# Patient Record
Sex: MALE | Race: WHITE | NOT HISPANIC OR LATINO
[De-identification: names, ages, dates, MRNs, and addresses within clinical notes are randomized per-mention and may not be internally consistent; named-entity substitution may affect disease eponyms.]

---

## 2020-02-27 PROBLEM — Z00.00 ENCOUNTER FOR PREVENTIVE HEALTH EXAMINATION: Status: ACTIVE | Noted: 2020-02-27

## 2020-03-03 ENCOUNTER — APPOINTMENT (OUTPATIENT)
Dept: INTERNAL MEDICINE | Facility: CLINIC | Age: 55
End: 2020-03-03
Payer: COMMERCIAL

## 2020-03-03 VITALS
DIASTOLIC BLOOD PRESSURE: 80 MMHG | BODY MASS INDEX: 31.07 KG/M2 | HEIGHT: 68 IN | SYSTOLIC BLOOD PRESSURE: 130 MMHG | WEIGHT: 205 LBS

## 2020-03-03 DIAGNOSIS — Z78.9 OTHER SPECIFIED HEALTH STATUS: ICD-10-CM

## 2020-03-03 PROCEDURE — 99205 OFFICE O/P NEW HI 60 MIN: CPT | Mod: 25

## 2020-03-03 PROCEDURE — 36415 COLL VENOUS BLD VENIPUNCTURE: CPT

## 2020-03-03 NOTE — HISTORY OF PRESENT ILLNESS
[FreeTextEntry1] : This is a complicated case of a 54-year-old  male who is referred here because of probable septic arthritis and osteomyelitis of his left hallux. 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 Approximately 6 years ago the patient developed a blister that developed into cellulitis and what appears to be osteomyelitis. Eventually required debridement and removal of his interphalangeal phalanx of his left great toe. He received short course of antibiotics intravenously and was out of work for 2-3 Ma\par He returns now because for the last several months beginning around the summertime he developed a blister the base of his left great toe. He has been followed by a podiatry and has had multiple local debridement on some multiple courses of oral antibiotics. Patient had anaphylaxis to penicillin as a child but has tolerated Keflex since then without difficulty.\par He has been treated with oral clindamycin x4. Most recently patient had an MRI of the toe that has revealed significant abnormalities there was erosion of the first metatarsal head with diffuse marrow replacement consistent with osteomyelitis. In addition he had erosions of the first metatarsal head and adjacent hallux sesamoid. They're MR findings of septic arthritis the first MP joint with osteomyelitis. This is read  from an MRI report sent to us. 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 He states he is become homeless if he has surgery now.\par He is sent to the to try a course of oral intravenous antibiotics without surgery\par \par He denies symptomatic neuropathy or systemic symptoms\par \par

## 2020-03-03 NOTE — CONSULT LETTER
[Dear  ___] : Dear  [unfilled], [Consult Letter:] : I had the pleasure of evaluating your patient, [unfilled]. [Please see my note below.] : Please see my note below. [Consult Closing:] : Thank you very much for allowing me to participate in the care of this patient.  If you have any questions, please do not hesitate to contact me. [Sincerely,] : Sincerely, [FreeTextEntry3] : Aurelia\par Siva Stevenson MD FACP\par Diplomates American Board of Internal Medicine and Infectious Diseases\par NPP Infectious diseases and Internal medicine [facility]

## 2020-03-03 NOTE — PHYSICAL EXAM
[General Appearance - Alert] : alert [General Appearance - In No Acute Distress] : in no acute distress [PERRL With Normal Accommodation] : pupils were equal in size, round, reactive to light [Sclera] : the sclera and conjunctiva were normal [Outer Ear] : the ears and nose were normal in appearance [Extraocular Movements] : extraocular movements were intact [Neck Appearance] : the appearance of the neck was normal [Oropharynx] : the oropharynx was normal with no thrush [Neck Cervical Mass (___cm)] : no neck mass was observed [Thyroid Diffuse Enlargement] : the thyroid was not enlarged [Jugular Venous Distention Increased] : there was no jugular-venous distention [Auscultation Breath Sounds / Voice Sounds] : lungs were clear to auscultation bilaterally [Heart Rate And Rhythm] : heart rate was normal and rhythm regular [Heart Sounds] : normal S1 and S2 [Heart Sounds Gallop] : no gallops [Murmurs] : no murmurs [Heart Sounds Pericardial Friction Rub] : no pericardial rub [Full Pulse] : the pedal pulses are present [Bowel Sounds] : normal bowel sounds [Edema] : there was no peripheral edema [Abdomen Soft] : soft [Abdomen Tenderness] : non-tender [Abdomen Mass (___ Cm)] : no abdominal mass palpated [Costovertebral Angle Tenderness] : no CVA tenderness [No Palpable Adenopathy] : no palpable adenopathy [Skin Color & Pigmentation] : normal skin color and pigmentation [] : no rash [Sensation] : the sensory exam was normal to light touch and pinprick [Deep Tendon Reflexes (DTR)] : deep tendon reflexes were 2+ and symmetric [No Focal Deficits] : no focal deficits [FreeTextEntry1] : Patient had normal sensation and couldn't distinguish pain from touch on his left foot

## 2020-03-03 NOTE — ASSESSMENT
[FreeTextEntry1] : Patient clearly has septic arthritis and osteomyelitis of his left great toe. In addition he has a sinus tract that probes deeply to bone consistent with the above diagnosis. He is also diabetic and is unclear what his A1c is\par A very long conversation with the patient that oral or even intravenous antibiotics by itself has limited efficacy based on the findings on MRI.\par If he was able to have it performed he should have operative open debridement deep bone cultures and drainage of any septic arthritis and probable removal of sesamoid bones.\par I do not think antibiotics will be curative. Previous cultures grew a sensitive group B strep as well as sensitive Klebsiella\par The options to the patient were given to him. He stated quite emphatically that he is unable to have surgery on his foot and he is aware of the potential loss of large toe due to persistent infection. Patient states quite clearly that he will become homeless if he is out of work and he will not be able to maintain his occupation if he has surgery.\par Based on the above plan opted to obtain a deep culture although MIP negative since he just came off a course of antibiotics.\par Since this is an infection of a chronic nature I have opted to defer antibiotics and have the patient followup in 2 weeks for a repeat deep cultures. At that time patient should be placed on intravenous antibiotics for 6-8 week course of treatment followed by oral therapy. The patient has no issues with intravenous antibiotics.\par \par I do not feel it is important to start immediately as he has been on multiple courses of antibiotics and he has no objective evidence of active cellulitis or purulence. This is clearly a chronic condition.\par Patient will follow up sooner if he develops increased redness for institution of antibiotics immediately.\par Most likely I will give the patient ceftriaxone based on sensitivities. His anaphylaxis to penicillin was as a child and he has been on cephalosporin since then without difficulty\par \par This is all discussed with patient at great length and I noted my hesitancy to just treat with antibiotics alone and the patient is fully aware that he may not get a good outcome until he has definitive surgery but at the current time he is unable to\par \par All issues regarding patient's health and medical problems have been discussed. The patient understands and concurs with the treatment plan. [Treatment Education] : treatment education [Rx Dose / Side Effects] : Rx dose/side effects [Risk Reduction] : risk reduction [Disclosure of Diagnosis] : disclosure of diagnosis [Anticipatory Guidance] : anticipatory guidance

## 2020-03-04 LAB
ALBUMIN SERPL ELPH-MCNC: 4.1 G/DL
ALP BLD-CCNC: 118 U/L
ALT SERPL-CCNC: 9 U/L
ANION GAP SERPL CALC-SCNC: 14 MMOL/L
AST SERPL-CCNC: 7 U/L
BASOPHILS # BLD AUTO: 0.04 K/UL
BASOPHILS NFR BLD AUTO: 0.5 %
BILIRUB SERPL-MCNC: 0.3 MG/DL
BUN SERPL-MCNC: 15 MG/DL
CALCIUM SERPL-MCNC: 9.5 MG/DL
CHLORIDE SERPL-SCNC: 97 MMOL/L
CO2 SERPL-SCNC: 23 MMOL/L
CREAT SERPL-MCNC: 0.94 MG/DL
EOSINOPHIL # BLD AUTO: 0.11 K/UL
EOSINOPHIL NFR BLD AUTO: 1.3 %
ESTIMATED AVERAGE GLUCOSE: 315 MG/DL
GLUCOSE SERPL-MCNC: 517 MG/DL
HBA1C MFR BLD HPLC: 12.6 %
HCT VFR BLD CALC: 44 %
HGB BLD-MCNC: 14.7 G/DL
IMM GRANULOCYTES NFR BLD AUTO: 0.2 %
LYMPHOCYTES # BLD AUTO: 2.11 K/UL
LYMPHOCYTES NFR BLD AUTO: 25.5 %
MAN DIFF?: NORMAL
MCHC RBC-ENTMCNC: 28.9 PG
MCHC RBC-ENTMCNC: 33.4 GM/DL
MCV RBC AUTO: 86.6 FL
MONOCYTES # BLD AUTO: 0.5 K/UL
MONOCYTES NFR BLD AUTO: 6 %
NEUTROPHILS # BLD AUTO: 5.5 K/UL
NEUTROPHILS NFR BLD AUTO: 66.5 %
PLATELET # BLD AUTO: 345 K/UL
POTASSIUM SERPL-SCNC: 5.1 MMOL/L
PROT SERPL-MCNC: 7.3 G/DL
RBC # BLD: 5.08 M/UL
RBC # FLD: 12.3 %
SODIUM SERPL-SCNC: 133 MMOL/L
WBC # FLD AUTO: 8.28 K/UL

## 2020-03-09 LAB — BACTERIA WND CULT: ABNORMAL

## 2020-03-10 ENCOUNTER — APPOINTMENT (OUTPATIENT)
Dept: INTERNAL MEDICINE | Facility: CLINIC | Age: 55
End: 2020-03-10
Payer: COMMERCIAL

## 2020-03-10 VITALS
WEIGHT: 205 LBS | HEIGHT: 68 IN | SYSTOLIC BLOOD PRESSURE: 140 MMHG | BODY MASS INDEX: 31.07 KG/M2 | DIASTOLIC BLOOD PRESSURE: 70 MMHG

## 2020-03-10 PROCEDURE — 99215 OFFICE O/P EST HI 40 MIN: CPT

## 2020-03-10 RX ORDER — LANCETS 33 GAUGE
EACH MISCELLANEOUS
Qty: 100 | Refills: 5 | Status: DISCONTINUED | COMMUNITY
End: 2020-03-10

## 2020-03-10 RX ORDER — BLOOD-GLUCOSE METER
W/DEVICE EACH MISCELLANEOUS
Qty: 1 | Refills: 0 | Status: DISCONTINUED | COMMUNITY
End: 2020-03-10

## 2020-03-10 RX ORDER — BLOOD SUGAR DIAGNOSTIC
STRIP MISCELLANEOUS
Qty: 100 | Refills: 0 | Status: DISCONTINUED | COMMUNITY
End: 2020-03-10

## 2020-03-10 NOTE — HISTORY OF PRESENT ILLNESS
[FreeTextEntry1] : This is a complicated case of a 54-year-old  male who is referred here because of probable septic arthritis and osteomyelitis of his left hallux. 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 Approximately 6 years ago the patient developed a blister that developed into cellulitis and what appears to be osteomyelitis. Eventually required debridement and removal of his interphalangeal phalanx of his left great toe. He received short course of antibiotics intravenously and was out of work for 2-3 Ma\par He returns now because for the last several months beginning around the summertime he developed a blister the base of his left great toe. He has been followed by a podiatry and has had multiple local debridement on some multiple courses of oral antibiotics. Patient had anaphylaxis to penicillin as a child but has tolerated Keflex since then without difficulty.\par He has been treated with oral clindamycin x4. Most recently patient had an MRI of the toe that has revealed significant abnormalities there was erosion of the first metatarsal head with diffuse marrow replacement consistent with osteomyelitis. In addition he had erosions of the first metatarsal head and adjacent hallux sesamoid. They're MR findings of septic arthritis the first MP joint with osteomyelitis. This is read  from an MRI report sent to us. 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 He states he is become homeless if he has surgery now.\par He is sent to the to try a course of oral intravenous antibiotics without surgery\par \par Since last visit patient has not been on any antibiotics. His hemoglobin A1c was close to 12% and his glucose was over 500. He was begun on metformin thousand milligrams twice a day and referred to endocrinology. Since last week patient states he has dramatically adjusted his diet and is taking metformin but has not had insurance approval for glucose testing machine yet\par He states his toe is no worse and says there is no drainage\par \par

## 2020-03-10 NOTE — ASSESSMENT
[FreeTextEntry1] : Results of current evaluation discussed with patient. Medications were reviewed and all relevant labs as well as a 1C level and glucose levels were discussed in depth with  patient. Further options for ideal control were discussed, long-term complications of diabetes and screening for complications were also discussed. Patient was conversant and all relevant questions were asked and answered. The lung conversation with the patient regarding his diabetes. He states he is seeing an endocrinologist this week and has altered his diet and will begin testing. I placed him on metformin due to the lack of a primary care physician for him but told him he needs to have his diabetes control through endocrinology\par \par Condition patient with purulent drainage there was recultured. Prior cultures grew group B strep that is probably resistant to prior clindamycin. No gram-negative organisms isolated\par Patient is to see his podiatrist his afternoon to discuss his surgical options. Patient is more conducive to surgery now. Her long conversation with him and told him my opinion he is he needs probable toe amputation and debridement of joint in attempt to salvage 1 MTP head. I will defer that to podiatry. In addition patient was placed on oral Vantin today and order is placed for intravenous Rocephin x6 weeks through a midline\par Patient will followup in one week after discussion with podiatry. Patient understands the necessity for operative treatment in an attempt to salvage the MTP and prevent spread to other bones in his foot.\par  [Treatment Education] : treatment education [Treatment Adherence] : treatment adherence [Rx Dose / Side Effects] : Rx dose/side effects [Risk Reduction] : risk reduction

## 2020-03-10 NOTE — PHYSICAL EXAM
[General Appearance - Alert] : alert [General Appearance - In No Acute Distress] : in no acute distress [Sclera] : the sclera and conjunctiva were normal [PERRL With Normal Accommodation] : pupils were equal in size, round, reactive to light [Extraocular Movements] : extraocular movements were intact [Outer Ear] : the ears and nose were normal in appearance [Oropharynx] : the oropharynx was normal with no thrush [Neck Appearance] : the appearance of the neck was normal [Neck Cervical Mass (___cm)] : no neck mass was observed [Jugular Venous Distention Increased] : there was no jugular-venous distention [Thyroid Diffuse Enlargement] : the thyroid was not enlarged [Auscultation Breath Sounds / Voice Sounds] : lungs were clear to auscultation bilaterally [Heart Rate And Rhythm] : heart rate was normal and rhythm regular [Heart Sounds] : normal S1 and S2 [Heart Sounds Gallop] : no gallops [Murmurs] : no murmurs [Heart Sounds Pericardial Friction Rub] : no pericardial rub [Full Pulse] : the pedal pulses are present [Edema] : there was no peripheral edema [Bowel Sounds] : normal bowel sounds [Abdomen Soft] : soft [Abdomen Tenderness] : non-tender [Abdomen Mass (___ Cm)] : no abdominal mass palpated [Costovertebral Angle Tenderness] : no CVA tenderness [No Palpable Adenopathy] : no palpable adenopathy [Skin Color & Pigmentation] : normal skin color and pigmentation [] : no rash [Deep Tendon Reflexes (DTR)] : deep tendon reflexes were 2+ and symmetric [Sensation] : the sensory exam was normal to light touch and pinprick [No Focal Deficits] : no focal deficits [FreeTextEntry1] : Patient had normal sensation and couldn't distinguish pain from touch on his left foot

## 2020-03-12 ENCOUNTER — APPOINTMENT (OUTPATIENT)
Dept: ENDOCRINOLOGY | Facility: CLINIC | Age: 55
End: 2020-03-12
Payer: COMMERCIAL

## 2020-03-12 VITALS
WEIGHT: 205 LBS | HEART RATE: 77 BPM | DIASTOLIC BLOOD PRESSURE: 78 MMHG | OXYGEN SATURATION: 100 % | HEIGHT: 68 IN | BODY MASS INDEX: 31.07 KG/M2 | SYSTOLIC BLOOD PRESSURE: 128 MMHG

## 2020-03-12 DIAGNOSIS — Z60.2 PROBLEMS RELATED TO LIVING ALONE: ICD-10-CM

## 2020-03-12 DIAGNOSIS — Z78.9 OTHER SPECIFIED HEALTH STATUS: ICD-10-CM

## 2020-03-12 LAB — GLUCOSE BLDC GLUCOMTR-MCNC: 202

## 2020-03-12 PROCEDURE — 82962 GLUCOSE BLOOD TEST: CPT

## 2020-03-12 PROCEDURE — 99203 OFFICE O/P NEW LOW 30 MIN: CPT | Mod: 25

## 2020-03-12 SDOH — SOCIAL STABILITY - SOCIAL INSECURITY: PROBLEMS RELATED TO LIVING ALONE: Z60.2

## 2020-03-12 NOTE — HISTORY OF PRESENT ILLNESS
[FreeTextEntry1] : Pt. reports over the summer he had left foot blister that now developed into a staph infection. Currently, he is on oral antibiotics and is going to have a PICC  placed tomorrow for IV antibiotics.   \par \par Quality: Type 2 DM\par Severity: uncontrolled \par Duration: 8 years ago \par Onset: cellulitis, A1C 11\par controlled with Metformin a low carb diet \par Modifying Factors: Better with medication \par Associated Symptoms: neuropathy. retinopathy \par \par Current Regimen:\par Metformin 500 mg BID\par \par \par Self blood sugar monitoring: 3-4 times a day \par BB: 191\par after meals 202-300\par Current \par  \par exercise: exercise daily  - set ups and push ups \par \par Diet: recently changed to a low carb diet \par B- eggs, yogurt\par L- salad with chicken\par D- chicken, beans, vegetables\par Snacks - nuts, fruit \par \par \par Date of last eye exam: over 1 year ago (+) DR\par Date of last foot exam: 3/3/20 with podiatry \par Date of last flu vaccine: no \par Date of last Pneumovax: no

## 2020-03-12 NOTE — REVIEW OF SYSTEMS
[Recent Weight Loss (___ Lbs)] : recent [unfilled] ~Ulb weight loss [Fatigue] : no fatigue [Decreased Appetite] : appetite not decreased [Visual Field Defect] : no visual field defect [Blurry Vision] : no blurred vision [Dysphagia] : no dysphagia [Dysphonia] : no dysphonia [Neck Pain] : no neck pain [Chest Pain] : no chest pain [Palpitations] : no palpitations [Constipation] : no constipation [Diarrhea] : no diarrhea [Polyuria] : no polyuria [Dysuria] : no dysuria [Headache] : no headaches [Tremors] : no tremors [Depression] : no depression [Anxiety] : no anxiety [Polydipsia] : no polydipsia [Cold Intolerance] : cold tolerant [Heat Intolerance] : heat tolerant [Easy Bruising] : no tendency for easy bruising [Swelling] : no swelling

## 2020-03-12 NOTE — PHYSICAL EXAM
[Alert] : alert [No Acute Distress] : no acute distress [Well Nourished] : well nourished [Well Developed] : well developed [Normal Sclera/Conjunctiva] : normal sclera/conjunctiva [EOMI] : extra ocular movement intact [Supple] : the neck was supple [No LAD] : no lymphadenopathy [Thyroid Not Enlarged] : the thyroid was not enlarged [Normal Rate and Effort] : normal respiratory rhythm and effort [No Accessory Muscle Use] : no accessory muscle use [Clear to Auscultation] : lungs were clear to auscultation bilaterally [Normal Rate] : heart rate was normal  [Normal S1, S2] : normal S1 and S2 [Regular Rhythm] : with a regular rhythm [Pedal Pulses Normal] : the pedal pulses are present [Normal Bowel Sounds] : normal bowel sounds [Not Tender] : non-tender [Soft] : abdomen soft [No Rash] : no rash [Right Foot Was Examined] : right foot ~C was examined [Left Foot Was Examined] : left foot ~C was examined [No Tremors] : no tremors [Oriented x3] : oriented to person, place, and time [Normal Insight/Judgement] : insight and judgment were intact [Normal Mood] : the mood was normal [Acanthosis Nigricans] : no acanthosis nigricans [#1 Diminished] : number 1 was normal [#2 Diminished] : number 2 was normal [#3 Diminished] : number 3 was normal [#4 Diminished] : number 4 was normal [#5 Diminished] : number 5 was normal [#6 Diminished] : number 6 was normal [#7 Diminished] : number 7 was normal [#8 Diminished] : number 8 was normal [#9 Diminished] : number 9 was normal [FreeTextEntry2] : 1st toe swollen, red [FreeTextEntry6] : 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

## 2020-03-12 NOTE — ASSESSMENT
[FreeTextEntry1] : 55 y/o male has Osteomyelitis of left foot with Type 2 DM. A1C 12.6% \par \par Plan: \par Type 2 DM: uncontrolled\par - educated on action and use of insulin along with injection technique storage, sharps disposal \par - start Lantus 12 units at HS\par - most likely will need to start prandial insulin \par - continue Metformin \par - send in logs in 3 days and weekly until next appointment \par - continue low carb diet \par -exercise as tolerated\par - educated on the importance of annual eye exams r/t retinopathy \par - schedule appointment in 1-2 weeks with CDE for diabetes education and log review\par \par Osteomyelitis of left foot: continue to follow up with ID and start antibiotics as prescribed \par \par - follow up visit in 5 weeks [Diabetes Foot Care] : diabetes foot care [Importance of Diet and Exercise] : importance of diet and exercise to improve glycemic control, achieve weight loss and improve cardiovascular health [Action and use of Insulin] : action and use of short and long-acting insulin [Insulin Self-Administration] : insulin self-administration [Injection Technique, Storage, Sharps Disposal] : injection technique, storage, and sharps disposal [Retinopathy Screening] : Patient was referred to ophthalmology for retinopathy screening

## 2020-03-16 LAB — BACTERIA WND CULT: ABNORMAL

## 2020-03-17 ENCOUNTER — APPOINTMENT (OUTPATIENT)
Dept: INTERNAL MEDICINE | Facility: CLINIC | Age: 55
End: 2020-03-17
Payer: COMMERCIAL

## 2020-03-17 VITALS
DIASTOLIC BLOOD PRESSURE: 70 MMHG | SYSTOLIC BLOOD PRESSURE: 120 MMHG | BODY MASS INDEX: 31.07 KG/M2 | HEIGHT: 68 IN | WEIGHT: 205 LBS

## 2020-03-17 PROCEDURE — 99215 OFFICE O/P EST HI 40 MIN: CPT

## 2020-03-17 RX ORDER — CEFPODOXIME PROXETIL 200 MG/1
200 TABLET, FILM COATED ORAL
Qty: 20 | Refills: 0 | Status: COMPLETED | COMMUNITY
Start: 2020-03-10 | End: 2020-03-17

## 2020-03-17 NOTE — HISTORY OF PRESENT ILLNESS
[FreeTextEntry1] : This is a complicated case of a 54-year-old  male who is referred here because of probable septic arthritis and osteomyelitis of his left hallux. 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 Approximately 6 years ago the patient developed a blister that developed into cellulitis and what appears to be osteomyelitis. Eventually required debridement and removal of his interphalangeal phalanx of his left great toe. He received short course of antibiotics intravenously and was out of work for 2-3 Ma\par He returns now because for the last several months beginning around the summertime he developed a blister the base of his left great toe. He has been followed by a podiatry and has had multiple local debridement on some multiple courses of oral antibiotics. Patient had anaphylaxis to penicillin as a child but has tolerated Keflex since then without difficulty.\par He has been treated with oral clindamycin x4. Most recently patient had an MRI of the toe that has revealed significant abnormalities there was erosion of the first metatarsal head with diffuse marrow replacement consistent with osteomyelitis. In addition he had erosions of the first metatarsal head and adjacent hallux sesamoid. They're MR findings of septic arthritis the first MP joint with osteomyelitis. This is read  from an MRI report sent to us. 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 He states he is become homeless if he has surgery now.\par He is sent to the to try a course of oral intravenous antibiotics without surgery\par \par Since last visit patient has not been on any antibiotics. His hemoglobin A1c was close to 12% and his glucose was over 500. He was begun on metformin thousand milligrams twice a day and referred to endocrinology. Since last week patient states he has dramatically adjusted his diet and is taking metformin but has not had insurance approval for glucose testing machine yet\par He states his toe is no worse and says there is no drainage\par \par On IV ABS now - rocephin 2 gms q 24 x 6 weeks\par Patient is doing extremely well on home intravenous antibiotics. Prior culture were positive for group B strep again and Klebsiella all sensitive to ceftriaxone. He is maintained on 2 g a day for the next 6 weeks.\par Visit marked improvement in his toe without drainage or swelling and he feels significantly better. He is also seen a endocrinologist and his glucose numbers on the mid 150s with the addition of insulin\par Unfortunately elected surgical intervention is unable to be performed due to recurrent carotid virus epidemic. However the patient has shown significant improvement and I see no urgency to proceed.\par \par Patient is aware that this may only be a holding pattern of therapy to prevent the infection from spreading elsewhere. However due to patient's significant improvement with the wound unable to be probed any death today it is possible that long-term intravenous followed by long-term roman oral therapy and control of diabetes may be successful\par \par This was discussed with patient at great length.\par \par \par

## 2020-03-26 ENCOUNTER — RX RENEWAL (OUTPATIENT)
Age: 55
End: 2020-03-26

## 2020-03-30 ENCOUNTER — APPOINTMENT (OUTPATIENT)
Dept: ENDOCRINOLOGY | Facility: CLINIC | Age: 55
End: 2020-03-30
Payer: COMMERCIAL

## 2020-03-30 ENCOUNTER — APPOINTMENT (OUTPATIENT)
Dept: ENDOCRINOLOGY | Facility: CLINIC | Age: 55
End: 2020-03-30

## 2020-03-30 PROCEDURE — 98967 PH1 ASSMT&MGMT NQHP 11-20: CPT

## 2020-03-30 PROCEDURE — G0108 DIAB MANAGE TRN  PER INDIV: CPT

## 2020-03-31 ENCOUNTER — APPOINTMENT (OUTPATIENT)
Dept: INTERNAL MEDICINE | Facility: CLINIC | Age: 55
End: 2020-03-31
Payer: COMMERCIAL

## 2020-03-31 VITALS
DIASTOLIC BLOOD PRESSURE: 80 MMHG | SYSTOLIC BLOOD PRESSURE: 130 MMHG | WEIGHT: 205 LBS | HEIGHT: 68 IN | BODY MASS INDEX: 31.07 KG/M2

## 2020-03-31 PROCEDURE — 99214 OFFICE O/P EST MOD 30 MIN: CPT

## 2020-03-31 NOTE — HISTORY OF PRESENT ILLNESS
[FreeTextEntry1] : Patient here in follow up to last visit and prior issue\par  [de-identified] : Patient here in followup to his osteomyelitis and septic arthritis of his left big toe. Details of his history of been described elsewhere. He is currently on home intravenous Rocephin without difficulty. He states the toe feels better with less pain and no drainage. He is still seeing podiatry

## 2020-03-31 NOTE — PLAN
[FreeTextEntry1] : Patient shown remarkable improvement since institution of intravenous antibiotics. His normal range of motion of the toe. The sinus tract was probed and went down less than a quarter of an inch. Unable to probe into joint as I was initially. Laboratory data was reviewed and within normal limits.\par Physical exam today shows no erythema\par Plan is to continue intravenous antibiotics for 6 weeks followed by oral antibiotics for an additional 3-6 months with followup MRI he approximately 3 months after completion of intravenous antibiotics\par Physical discuss with patient at great length.\par All issues regarding patient's health and medical problems have been discussed. The patient understands and concurs with the treatment plan.

## 2020-03-31 NOTE — ASSESSMENT
[FreeTextEntry1] : Results of current evaluation discussed with patient. Medications were reviewed and all relevant labs as well as a 1C level and glucose levels were discussed in depth with  patient. Further options for ideal control were discussed, long-term complications of diabetes and screening for complications were also discussed. Patient was conversant and all relevant questions were asked and answered. Patient is to follow with endocrinology regarding repeat A1c.\par \par Patient with septic arthritis and osteomyelitis this shows considerable improvement on current intravenous antibiotic regimen. He is compliant to 100% with antibiotic use and nonweightbearing.\par Ultimately the plan has changed from amputation due to current pandemic to attempted treatment with antibiotics. He will receive Rocephin and intravenously for 6 weeks followup oral therapy or approximately 3-6 months. Overall I see significant improve

## 2020-03-31 NOTE — PHYSICAL EXAM
[Normal] : no posterior cervical lymphadenopathy and no anterior cervical lymphadenopathy [de-identified] : Patient with bilateral hammertoe deformities without ulcerations. Exam in the left great toe reveals significant improvement without erythema. Small sinus tract of the base of his toe has no drainage. It was probed only superficially and bone was not felt. Range of motion is significantly improve

## 2020-04-13 ENCOUNTER — APPOINTMENT (OUTPATIENT)
Dept: ENDOCRINOLOGY | Facility: CLINIC | Age: 55
End: 2020-04-13

## 2020-04-21 ENCOUNTER — APPOINTMENT (OUTPATIENT)
Dept: INTERNAL MEDICINE | Facility: CLINIC | Age: 55
End: 2020-04-21
Payer: COMMERCIAL

## 2020-04-21 VITALS
BODY MASS INDEX: 31.07 KG/M2 | WEIGHT: 205 LBS | HEIGHT: 68 IN | DIASTOLIC BLOOD PRESSURE: 80 MMHG | SYSTOLIC BLOOD PRESSURE: 125 MMHG

## 2020-04-21 PROCEDURE — 36415 COLL VENOUS BLD VENIPUNCTURE: CPT

## 2020-04-21 PROCEDURE — 99215 OFFICE O/P EST HI 40 MIN: CPT | Mod: 25

## 2020-04-21 NOTE — HISTORY OF PRESENT ILLNESS
[FreeTextEntry1] : \par On IV ABS now - rocephin 2 gms q 24 x 6 weeks\par Patient is doing extremely well on home intravenous antibiotics. Prior culture were positive for group B strep again and Klebsiella all sensitive to ceftriaxone. He is maintained on 2 g a day for the next 6 weeks.\par Visit marked improvement in his toe without drainage or swelling and he feels significantly better. He is also seen a endocrinologist and his glucose numbers on the mid 150s with the addition of insulin\par Unfortunately elected surgical intervention is unable to be performed due to recurrent carotid virus epidemic. However the patient has shown significant improvement and I see no urgency to proceed.\par \par Patient is aware that this may only be a holding pattern of therapy to prevent the infection from spreading elsewhere. However due to patient's significant improvement with the wound unable to be probed any death today it is possible that long-term intravenous followed by long-term roman oral therapy and control of diabetes may be successful\par \par Patient returns today in the end of his intravenous antibiotics. He has been compliant with his medications and feels well. He has no symptomatic diarrhea. He has no issues with his PICC line. He notes continued improvement in his toe without pain and drainage. He is up-to-date with podiatry\par Patient also states that his glucose numbers have been low 100s and is due to have his A1c checked soon\par \par

## 2020-04-21 NOTE — PHYSICAL EXAM
[General Appearance - Alert] : alert [General Appearance - In No Acute Distress] : in no acute distress [Sclera] : the sclera and conjunctiva were normal [PERRL With Normal Accommodation] : pupils were equal in size, round, reactive to light [Extraocular Movements] : extraocular movements were intact [Outer Ear] : the ears and nose were normal in appearance [Oropharynx] : the oropharynx was normal with no thrush [Neck Appearance] : the appearance of the neck was normal [Neck Cervical Mass (___cm)] : no neck mass was observed [Jugular Venous Distention Increased] : there was no jugular-venous distention [Thyroid Diffuse Enlargement] : the thyroid was not enlarged [Auscultation Breath Sounds / Voice Sounds] : lungs were clear to auscultation bilaterally [Heart Rate And Rhythm] : heart rate was normal and rhythm regular [Heart Sounds] : normal S1 and S2 [Heart Sounds Gallop] : no gallops [Murmurs] : no murmurs [Heart Sounds Pericardial Friction Rub] : no pericardial rub [Full Pulse] : the pedal pulses are present [Edema] : there was no peripheral edema [Bowel Sounds] : normal bowel sounds [Abdomen Soft] : soft [Abdomen Tenderness] : non-tender [Costovertebral Angle Tenderness] : no CVA tenderness [Abdomen Mass (___ Cm)] : no abdominal mass palpated [No Palpable Adenopathy] : no palpable adenopathy [Skin Color & Pigmentation] : normal skin color and pigmentation [] : no rash [Deep Tendon Reflexes (DTR)] : deep tendon reflexes were 2+ and symmetric [Sensation] : the sensory exam was normal to light touch and pinprick [No Focal Deficits] : no focal deficits [FreeTextEntry1] : Patient had normal sensation and couldn't distinguish pain from touch on his left foot

## 2020-04-21 NOTE — ASSESSMENT
[FreeTextEntry1] : Results of current evaluation discussed with patient. Medications were reviewed and all relevant labs as well as a 1C level and glucose levels were discussed in depth with  patient. Further options for ideal control were discussed, long-term complications of diabetes and screening for complications were also discussed. Patient was conversant and all relevant questions were asked and answered.patient is due to see his endocrinologist but is showing marked improvement. Hemoglobin A1c will be drawn today follow there review\par \par Patient showing significant improvement without evidence of septic arthritis of the joint. Wound is showing significant improvement as unable to probe at this time. I have a conversation with the patient regarding the long-term need for antibiotics. He is aware that there is no indication to do repeat x-rays or MRIs for at least 3-6 monthsas I do not expect to see any significant improvement. Clinically the patient is doing very well. Plan at this time is to discontinue ceftriaxone and 72 hours would be 6 weeksand Motrin didn't continue oral cefpodoxime 200 twice a day for 3-6 months.\par Patient is fully aware of the necessity for long-term treatmentand will be 100% compliant.\par \par All issues regarding patient's health and medical problems have been discussed. The patient understands and concurs with the treatment plan. [Treatment Education] : treatment education [Treatment Adherence] : treatment adherence [Rx Dose / Side Effects] : Rx dose/side effects [Drug Interactions / Side Effects] : drug interactions/side effects [Disclosure of Diagnosis] : disclosure of diagnosis

## 2020-04-22 LAB
ESTIMATED AVERAGE GLUCOSE: 203 MG/DL
HBA1C MFR BLD HPLC: 8.7 %

## 2020-04-24 ENCOUNTER — RX CHANGE (OUTPATIENT)
Age: 55
End: 2020-04-24

## 2020-05-06 ENCOUNTER — RX CHANGE (OUTPATIENT)
Age: 55
End: 2020-05-06

## 2020-05-18 PROBLEM — Z45.2 PICC (PERIPHERALLY INSERTED CENTRAL CATHETER) IN PLACE: Status: ACTIVE | Noted: 2020-03-17

## 2020-05-18 PROBLEM — Z79.2 RECEIVING INTRAVENOUS ANTIBIOTIC TREATMENT AT HOME: Status: ACTIVE | Noted: 2020-03-17

## 2020-05-18 PROBLEM — M00.9: Status: ACTIVE | Noted: 2020-03-03

## 2020-05-19 ENCOUNTER — APPOINTMENT (OUTPATIENT)
Dept: INTERNAL MEDICINE | Facility: CLINIC | Age: 55
End: 2020-05-19
Payer: COMMERCIAL

## 2020-05-19 VITALS
SYSTOLIC BLOOD PRESSURE: 125 MMHG | WEIGHT: 210 LBS | BODY MASS INDEX: 31.83 KG/M2 | DIASTOLIC BLOOD PRESSURE: 70 MMHG | HEIGHT: 68 IN

## 2020-05-19 DIAGNOSIS — Z79.2 LONG TERM (CURRENT) USE OF ANTIBIOTICS: ICD-10-CM

## 2020-05-19 DIAGNOSIS — Z45.2 ENCOUNTER FOR ADJUSTMENT AND MANAGEMENT OF VASCULAR ACCESS DEVICE: ICD-10-CM

## 2020-05-19 DIAGNOSIS — M00.9 PYOGENIC ARTHRITIS, UNSPECIFIED: ICD-10-CM

## 2020-05-19 PROCEDURE — 99215 OFFICE O/P EST HI 40 MIN: CPT

## 2020-05-19 RX ORDER — INSULIN LISPRO 100 [IU]/ML
100 INJECTION, SOLUTION INTRAVENOUS; SUBCUTANEOUS
Qty: 1 | Refills: 0 | Status: COMPLETED | COMMUNITY
Start: 2020-03-12 | End: 2020-05-19

## 2020-05-19 NOTE — PHYSICAL EXAM
[General Appearance - In No Acute Distress] : in no acute distress [General Appearance - Alert] : alert [PERRL With Normal Accommodation] : pupils were equal in size, round, reactive to light [Sclera] : the sclera and conjunctiva were normal [Extraocular Movements] : extraocular movements were intact [Outer Ear] : the ears and nose were normal in appearance [Oropharynx] : the oropharynx was normal with no thrush [Neck Cervical Mass (___cm)] : no neck mass was observed [Neck Appearance] : the appearance of the neck was normal [Jugular Venous Distention Increased] : there was no jugular-venous distention [Thyroid Diffuse Enlargement] : the thyroid was not enlarged [Auscultation Breath Sounds / Voice Sounds] : lungs were clear to auscultation bilaterally [Heart Rate And Rhythm] : heart rate was normal and rhythm regular [Heart Sounds Gallop] : no gallops [Heart Sounds] : normal S1 and S2 [Murmurs] : no murmurs [Heart Sounds Pericardial Friction Rub] : no pericardial rub [Edema] : there was no peripheral edema [Full Pulse] : the pedal pulses are present [Bowel Sounds] : normal bowel sounds [Abdomen Soft] : soft [Abdomen Tenderness] : non-tender [Abdomen Mass (___ Cm)] : no abdominal mass palpated [Costovertebral Angle Tenderness] : no CVA tenderness [No Palpable Adenopathy] : no palpable adenopathy [Skin Color & Pigmentation] : normal skin color and pigmentation [] : no rash [Deep Tendon Reflexes (DTR)] : deep tendon reflexes were 2+ and symmetric [Sensation] : the sensory exam was normal to light touch and pinprick [No Focal Deficits] : no focal deficits [FreeTextEntry1] : Patient had normal sensation and couldn't distinguish pain from touch on his left foot

## 2020-05-19 NOTE — ASSESSMENT
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 He did very well on intravenous antibiotics and will continue on oral therapy for least the next 3-6 months. At that point he should be a look him off all antibiotics. Followup MRI will be performed several months and then and patient will continue on cefpodoxime\par \par Results of current evaluation discussed with patient. Medications were reviewed and all relevant labs as well as a 1C level and glucose levels were discussed in depth with  patient. Further options for ideal control were discussed, long-term complications of diabetes and screening for complications were also discussed. Patient was conversant and all relevant questions were asked and answered. Patient's A1c is still elevated 8.6% and he was encouraged to speak endocrinology regarding additional medications possibly Trulicity or jardiance patient is obese with a BMI of 30 he might benefit from Trulicity\par \par All issues regarding patient's health and medical problems have been discussed. The patient understands and concurs with the treatment plan. [Treatment Education] : treatment education [Treatment Adherence] : treatment adherence [Rx Dose / Side Effects] : Rx dose/side effects

## 2020-05-19 NOTE — HISTORY OF PRESENT ILLNESS
[FreeTextEntry1] : \par On IV ABS now - rocephin 2 gms q 24 x 6 weeks\par Patient is doing extremely well on home intravenous antibiotics. Prior culture were positive for group B strep again and Klebsiella all sensitive to ceftriaxone. He is maintained on 2 g a day for the next 6 weeks.\par Visit marked improvement in his toe without drainage or swelling and he feels significantly better. He is also seen a endocrinologist and his glucose numbers on the mid 150s with the addition of insulin\par Unfortunately elected surgical intervention is unable to be performed due to recurrent carotid virus epidemic. However the patient has shown significant improvement and I see no urgency to proceed.\par \par Patient is aware that this may only be a holding pattern of therapy to prevent the infection from spreading elsewhere. However due to patient's significant improvement with the wound unable to be probed any death today it is possible that long-term intravenous followed by long-term roman oral therapy and control of diabetes may be successful\par \par Patient returns today in the end of his intravenous antibiotics. He has been compliant with his medications and feels well. He has no symptomatic diarrhea. He has no issues with his PICC line. He notes continued improvement in his toe without pain and drainage. He is up-to-date with podiatry\par Patient also states that his glucose numbers have been low 100s and is due to have his A1c checked soon\par \par \par Since last visit patient has been on cefpodoxime 200 twice day without difficulty. He states he is continuing to feel better. He was seen by podiatry with no evidence of continuing infection. His sinus tract is closed and the swelling has resolved. He is doing very well on oral antibiotic therapy.\par Patient also had repeat A1c drawn it was 8.7 and he was due to followup with endocrinology\par \par \par

## 2020-06-29 ENCOUNTER — RX RENEWAL (OUTPATIENT)
Age: 55
End: 2020-06-29

## 2020-12-22 ENCOUNTER — RX RENEWAL (OUTPATIENT)
Age: 55
End: 2020-12-22

## 2021-01-12 ENCOUNTER — TRANSCRIPTION ENCOUNTER (OUTPATIENT)
Age: 56
End: 2021-01-12

## 2021-11-21 ENCOUNTER — INPATIENT (INPATIENT)
Facility: HOSPITAL | Age: 56
LOS: 5 days | Discharge: HOME | DRG: 616 | End: 2021-11-27
Attending: INTERNAL MEDICINE
Payer: COMMERCIAL

## 2021-11-21 VITALS
DIASTOLIC BLOOD PRESSURE: 80 MMHG | TEMPERATURE: 100 F | HEART RATE: 97 BPM | HEIGHT: 68 IN | OXYGEN SATURATION: 98 % | WEIGHT: 190.04 LBS | SYSTOLIC BLOOD PRESSURE: 122 MMHG | RESPIRATION RATE: 18 BRPM

## 2021-11-21 DIAGNOSIS — E11.621 TYPE 2 DIABETES MELLITUS WITH FOOT ULCER: ICD-10-CM

## 2021-11-21 LAB
ALBUMIN SERPL ELPH-MCNC: 3.4 G/DL — SIGNIFICANT CHANGE UP (ref 3.3–5.2)
ALP SERPL-CCNC: 106 U/L — SIGNIFICANT CHANGE UP (ref 40–120)
ALT FLD-CCNC: 15 U/L — SIGNIFICANT CHANGE UP
ANION GAP SERPL CALC-SCNC: 18 MMOL/L — HIGH (ref 5–17)
APPEARANCE UR: CLEAR — SIGNIFICANT CHANGE UP
APTT BLD: 26.8 SEC — LOW (ref 27.5–35.5)
AST SERPL-CCNC: 21 U/L — SIGNIFICANT CHANGE UP
BACTERIA # UR AUTO: ABNORMAL
BASOPHILS # BLD AUTO: 0.04 K/UL — SIGNIFICANT CHANGE UP (ref 0–0.2)
BASOPHILS NFR BLD AUTO: 0.3 % — SIGNIFICANT CHANGE UP (ref 0–2)
BILIRUB SERPL-MCNC: 0.9 MG/DL — SIGNIFICANT CHANGE UP (ref 0.4–2)
BILIRUB UR-MCNC: NEGATIVE — SIGNIFICANT CHANGE UP
BUN SERPL-MCNC: 14.3 MG/DL — SIGNIFICANT CHANGE UP (ref 8–20)
CALCIUM SERPL-MCNC: 9.7 MG/DL — SIGNIFICANT CHANGE UP (ref 8.6–10.2)
CHLORIDE SERPL-SCNC: 92 MMOL/L — LOW (ref 98–107)
CO2 SERPL-SCNC: 18 MMOL/L — LOW (ref 22–29)
COLOR SPEC: YELLOW — SIGNIFICANT CHANGE UP
CREAT SERPL-MCNC: 0.98 MG/DL — SIGNIFICANT CHANGE UP (ref 0.5–1.3)
CRP SERPL-MCNC: 173 MG/L — HIGH
DIFF PNL FLD: ABNORMAL
EOSINOPHIL # BLD AUTO: 0.06 K/UL — SIGNIFICANT CHANGE UP (ref 0–0.5)
EOSINOPHIL NFR BLD AUTO: 0.4 % — SIGNIFICANT CHANGE UP (ref 0–6)
EPI CELLS # UR: SIGNIFICANT CHANGE UP
ERYTHROCYTE [SEDIMENTATION RATE] IN BLOOD: 44 MM/HR — HIGH (ref 0–20)
FLUAV AG NPH QL: SIGNIFICANT CHANGE UP
FLUBV AG NPH QL: SIGNIFICANT CHANGE UP
GLUCOSE BLDC GLUCOMTR-MCNC: 142 MG/DL — HIGH (ref 70–99)
GLUCOSE BLDC GLUCOMTR-MCNC: 149 MG/DL — HIGH (ref 70–99)
GLUCOSE SERPL-MCNC: 298 MG/DL — HIGH (ref 70–99)
GLUCOSE UR QL: 1000 MG/DL
HCT VFR BLD CALC: 40.6 % — SIGNIFICANT CHANGE UP (ref 39–50)
HGB BLD-MCNC: 13.9 G/DL — SIGNIFICANT CHANGE UP (ref 13–17)
HIV 1 & 2 AB SERPL IA.RAPID: SIGNIFICANT CHANGE UP
IMM GRANULOCYTES NFR BLD AUTO: 0.3 % — SIGNIFICANT CHANGE UP (ref 0–1.5)
INR BLD: 1.29 RATIO — HIGH (ref 0.88–1.16)
KETONES UR-MCNC: ABNORMAL
LACTATE BLDV-MCNC: 1.8 MMOL/L — SIGNIFICANT CHANGE UP (ref 0.5–2)
LEUKOCYTE ESTERASE UR-ACNC: NEGATIVE — SIGNIFICANT CHANGE UP
LYMPHOCYTES # BLD AUTO: 1.58 K/UL — SIGNIFICANT CHANGE UP (ref 1–3.3)
LYMPHOCYTES # BLD AUTO: 9.9 % — LOW (ref 13–44)
MCHC RBC-ENTMCNC: 29.4 PG — SIGNIFICANT CHANGE UP (ref 27–34)
MCHC RBC-ENTMCNC: 34.2 GM/DL — SIGNIFICANT CHANGE UP (ref 32–36)
MCV RBC AUTO: 85.8 FL — SIGNIFICANT CHANGE UP (ref 80–100)
MONOCYTES # BLD AUTO: 0.7 K/UL — SIGNIFICANT CHANGE UP (ref 0–0.9)
MONOCYTES NFR BLD AUTO: 4.4 % — SIGNIFICANT CHANGE UP (ref 2–14)
NEUTROPHILS # BLD AUTO: 13.56 K/UL — HIGH (ref 1.8–7.4)
NEUTROPHILS NFR BLD AUTO: 84.7 % — HIGH (ref 43–77)
NITRITE UR-MCNC: NEGATIVE — SIGNIFICANT CHANGE UP
PH UR: 6 — SIGNIFICANT CHANGE UP (ref 5–8)
PLATELET # BLD AUTO: 416 K/UL — HIGH (ref 150–400)
POTASSIUM SERPL-MCNC: 5.5 MMOL/L — HIGH (ref 3.5–5.3)
POTASSIUM SERPL-SCNC: 5.5 MMOL/L — HIGH (ref 3.5–5.3)
PROT SERPL-MCNC: 8.9 G/DL — HIGH (ref 6.6–8.7)
PROT UR-MCNC: 30 MG/DL
PROTHROM AB SERPL-ACNC: 14.8 SEC — HIGH (ref 10.6–13.6)
RBC # BLD: 4.73 M/UL — SIGNIFICANT CHANGE UP (ref 4.2–5.8)
RBC # FLD: 11.5 % — SIGNIFICANT CHANGE UP (ref 10.3–14.5)
RBC CASTS # UR COMP ASSIST: SIGNIFICANT CHANGE UP /HPF (ref 0–4)
RSV RNA NPH QL NAA+NON-PROBE: SIGNIFICANT CHANGE UP
SARS-COV-2 RNA SPEC QL NAA+PROBE: SIGNIFICANT CHANGE UP
SODIUM SERPL-SCNC: 128 MMOL/L — LOW (ref 135–145)
SP GR SPEC: 1.01 — SIGNIFICANT CHANGE UP (ref 1.01–1.02)
UROBILINOGEN FLD QL: NEGATIVE MG/DL — SIGNIFICANT CHANGE UP
WBC # BLD: 15.99 K/UL — HIGH (ref 3.8–10.5)
WBC # FLD AUTO: 15.99 K/UL — HIGH (ref 3.8–10.5)
WBC UR QL: SIGNIFICANT CHANGE UP

## 2021-11-21 PROCEDURE — 73630 X-RAY EXAM OF FOOT: CPT | Mod: 26,LT

## 2021-11-21 PROCEDURE — 99285 EMERGENCY DEPT VISIT HI MDM: CPT

## 2021-11-21 PROCEDURE — 99223 1ST HOSP IP/OBS HIGH 75: CPT

## 2021-11-21 PROCEDURE — 73630 X-RAY EXAM OF FOOT: CPT | Mod: 26,RT,77

## 2021-11-21 PROCEDURE — 93010 ELECTROCARDIOGRAM REPORT: CPT

## 2021-11-21 RX ORDER — DEXTROSE 50 % IN WATER 50 %
12.5 SYRINGE (ML) INTRAVENOUS ONCE
Refills: 0 | Status: DISCONTINUED | OUTPATIENT
Start: 2021-11-21 | End: 2021-11-22

## 2021-11-21 RX ORDER — SODIUM CHLORIDE 9 MG/ML
2600 INJECTION INTRAMUSCULAR; INTRAVENOUS; SUBCUTANEOUS ONCE
Refills: 0 | Status: COMPLETED | OUTPATIENT
Start: 2021-11-21 | End: 2021-11-21

## 2021-11-21 RX ORDER — GLUCAGON INJECTION, SOLUTION 0.5 MG/.1ML
1 INJECTION, SOLUTION SUBCUTANEOUS ONCE
Refills: 0 | Status: DISCONTINUED | OUTPATIENT
Start: 2021-11-21 | End: 2021-11-22

## 2021-11-21 RX ORDER — VANCOMYCIN HCL 1 G
1000 VIAL (EA) INTRAVENOUS EVERY 8 HOURS
Refills: 0 | Status: DISCONTINUED | OUTPATIENT
Start: 2021-11-21 | End: 2021-11-21

## 2021-11-21 RX ORDER — INSULIN LISPRO 100/ML
VIAL (ML) SUBCUTANEOUS
Refills: 0 | Status: DISCONTINUED | OUTPATIENT
Start: 2021-11-21 | End: 2021-11-22

## 2021-11-21 RX ORDER — VANCOMYCIN HCL 1 G
1000 VIAL (EA) INTRAVENOUS EVERY 12 HOURS
Refills: 0 | Status: DISCONTINUED | OUTPATIENT
Start: 2021-11-21 | End: 2021-11-22

## 2021-11-21 RX ORDER — ACETAMINOPHEN 500 MG
650 TABLET ORAL EVERY 6 HOURS
Refills: 0 | Status: DISCONTINUED | OUTPATIENT
Start: 2021-11-21 | End: 2021-11-22

## 2021-11-21 RX ORDER — CEFEPIME 1 G/1
2000 INJECTION, POWDER, FOR SOLUTION INTRAMUSCULAR; INTRAVENOUS ONCE
Refills: 0 | Status: DISCONTINUED | OUTPATIENT
Start: 2021-11-21 | End: 2021-11-21

## 2021-11-21 RX ORDER — LIDOCAINE HCL 20 MG/ML
20 VIAL (ML) INJECTION ONCE
Refills: 0 | Status: DISCONTINUED | OUTPATIENT
Start: 2021-11-21 | End: 2021-11-27

## 2021-11-21 RX ORDER — INSULIN LISPRO 100/ML
VIAL (ML) SUBCUTANEOUS AT BEDTIME
Refills: 0 | Status: DISCONTINUED | OUTPATIENT
Start: 2021-11-21 | End: 2021-11-22

## 2021-11-21 RX ORDER — INSULIN HUMAN 100 [IU]/ML
10 INJECTION, SOLUTION SUBCUTANEOUS ONCE
Refills: 0 | Status: COMPLETED | OUTPATIENT
Start: 2021-11-21 | End: 2021-11-21

## 2021-11-21 RX ORDER — KETOROLAC TROMETHAMINE 30 MG/ML
15 SYRINGE (ML) INJECTION EVERY 6 HOURS
Refills: 0 | Status: DISCONTINUED | OUTPATIENT
Start: 2021-11-21 | End: 2021-11-22

## 2021-11-21 RX ORDER — SODIUM CHLORIDE 9 MG/ML
1000 INJECTION, SOLUTION INTRAVENOUS
Refills: 0 | Status: DISCONTINUED | OUTPATIENT
Start: 2021-11-21 | End: 2021-11-22

## 2021-11-21 RX ORDER — VANCOMYCIN HCL 1 G
1250 VIAL (EA) INTRAVENOUS EVERY 12 HOURS
Refills: 0 | Status: DISCONTINUED | OUTPATIENT
Start: 2021-11-21 | End: 2021-11-21

## 2021-11-21 RX ORDER — DEXTROSE 50 % IN WATER 50 %
25 SYRINGE (ML) INTRAVENOUS ONCE
Refills: 0 | Status: DISCONTINUED | OUTPATIENT
Start: 2021-11-21 | End: 2021-11-22

## 2021-11-21 RX ORDER — VANCOMYCIN HCL 1 G
1000 VIAL (EA) INTRAVENOUS ONCE
Refills: 0 | Status: DISCONTINUED | OUTPATIENT
Start: 2021-11-21 | End: 2021-11-21

## 2021-11-21 RX ORDER — DEXTROSE 50 % IN WATER 50 %
15 SYRINGE (ML) INTRAVENOUS ONCE
Refills: 0 | Status: DISCONTINUED | OUTPATIENT
Start: 2021-11-21 | End: 2021-11-22

## 2021-11-21 RX ORDER — AZTREONAM 2 G
2000 VIAL (EA) INJECTION EVERY 8 HOURS
Refills: 0 | Status: DISCONTINUED | OUTPATIENT
Start: 2021-11-21 | End: 2021-11-22

## 2021-11-21 RX ADMIN — Medication 900 MILLIGRAM(S): at 18:43

## 2021-11-21 RX ADMIN — Medication 100 MILLIGRAM(S): at 17:53

## 2021-11-21 RX ADMIN — SODIUM CHLORIDE 2600 MILLILITER(S): 9 INJECTION INTRAMUSCULAR; INTRAVENOUS; SUBCUTANEOUS at 18:36

## 2021-11-21 RX ADMIN — INSULIN HUMAN 10 UNIT(S): 100 INJECTION, SOLUTION SUBCUTANEOUS at 18:36

## 2021-11-21 RX ADMIN — Medication 250 MILLIGRAM(S): at 23:03

## 2021-11-21 NOTE — H&P ADULT - NSHPSOCIALHISTORY_GEN_ALL_CORE
Works as , travels between [place] and [place]  Denies hx smoking  Social EtOH use  Repots very remote hx of illicit drug use, declines to state which drugs were used

## 2021-11-21 NOTE — ED ADULT NURSE NOTE - NSIMPLEMENTINTERV_GEN_ALL_ED
Implemented All Universal Safety Interventions:  [place] to call system. Call bell, personal items and telephone within reach. Instruct patient to call for assistance. Room bathroom lighting operational. Non-slip footwear when patient is off stretcher. Physically safe environment: no spills, clutter or unnecessary equipment. Stretcher in lowest position, wheels locked, appropriate side rails in place.

## 2021-11-21 NOTE — ED PROVIDER NOTE - PHYSICAL EXAMINATION
Gen: No acute distress, non toxic  HEENT: Mucous membranes moist, pink conjunctivae, EOMI  CV: RRR, nl s1/s2.  Resp: CTAB, normal rate and effort  GI: Abdomen soft, NT, ND. No rebound, no guarding  : No CVAT  Neuro: A&O x 3, moving all 4 extremities  MSK: right 1st toe with ulcerated deroofed lesion to distal toe, red streaking doral foot. nl pulse. sensation intact. nl perfusion thorughout foot  Skin: No rashes. intact and perfused.

## 2021-11-21 NOTE — H&P ADULT - NSHPPHYSICALEXAM_GEN_ALL_CORE
Vital Signs Last 24 Hrs  T(C): 36.9 (21 Nov 2021 19:45), Max: 37.5 (21 Nov 2021 15:32)  T(F): 98.5 (21 Nov 2021 19:45), Max: 99.5 (21 Nov 2021 15:32)  HR: 83 (21 Nov 2021 19:45) (83 - 97)  BP: 115/71 (21 Nov 2021 19:45) (115/71 - 122/80)  BP(mean): --  RR: 18 (21 Nov 2021 19:45) (18 - 18)  SpO2: 99% (21 Nov 2021 19:45) (98% - 99%)    GENERAL:  Well-appearing, not in acute distress  EYES:  Clear conjunctiva, extraocular movement intact  ENT: Moist mucous membranes  RESP:  Non-labored breathing pattern, lungs clear to ausculation  CV: Regular rate and rhythm, no murmurs appreciated, no lower extremity edema  GI: Soft, non-tender, non-distended  NEURO: Awake, alert, conversant, upper and lower extremity strength 5/5, light touch sensation grossly intact  PSYCH: Calm, cooperative  SKIN: Right foot wrapped in gauze and ACE bandage at time of exam, per podiatry note, "2cm R-toe plantar wound with serous drainage and 5cm R-submetatarsal ulcer with serous drainage'

## 2021-11-21 NOTE — ED ADULT NURSE NOTE - OBJECTIVE STATEMENT
Assumed care at 1710 pt co swelling, redness noted to right big toe radiating to foot and ankles. pt had a opening to the right big toe on September and was getting PO antibiotics, pt is on her feet for work and noticed that the toe is getting worse instead. pt denies any fevers, chills.

## 2021-11-21 NOTE — ED ADULT TRIAGE NOTE - CHIEF COMPLAINT QUOTE
pt sent in for wound check by MD for right toe infection. PT diabetic, reports this has been going on since september. Sent in for IV ABX from MD

## 2021-11-21 NOTE — ED PROVIDER NOTE - CLINICAL SUMMARY MEDICAL DECISION MAKING FREE TEXT BOX
right foot ulcer, worsening, no f/c. podiatry aware and recommending admission with iv clinda. xray, inflammatory markers, admit.

## 2021-11-21 NOTE — ED STATDOCS - PROGRESS NOTE DETAILS
Patient rapidly assessed in fast track. Patient with h/o DM presenting with diabetic wound infection to R 1st toe. Has been on oral antibiotics, however today patient noticed blistering at wound site, spoke with podiatrist who advised him to come to ED for IV antibiotics. Patient Denies fevers, chills, labile blood sugars. Patient moved to main ED. Marilynn Lagos DO

## 2021-11-21 NOTE — H&P ADULT - NSHPLABSRESULTS_GEN_ALL_CORE
13.9   15.99 )-----------( 416      ( 21 Nov 2021 17:37 )             40.6       11-22    132<L>  |  100  |  11.5  ----------------------------<  279<H>  4.3   |  19.0<L>  |  0.91    Ca    8.2<L>      22 Nov 2021 00:45    TPro  8.9<H>  /  Alb  3.4  /  TBili  0.9  /  DBili  x   /  AST  21  /  ALT  15  /  AlkPhos  106  11-21

## 2021-11-21 NOTE — ED PROVIDER NOTE - OBJECTIVE STATEMENT
54 y/o male hx dm (no longer taking meds trying to control with diet), chronic right foot ulcer sent by Dr. Jain for iv abx and admission due to worsening foot ulcer. pt on oral clindamycin for several days, blister popped and having red streaking to foot. denies pain, no f/c, no other complaints. anaphylaxis to penicillin per pt.     ROS: No fever/chills. No eye pain/changes in vision, No ear pain/sore throat/dysphagia, No chest pain/palpitations. No SOB/cough/. No abdominal pain, N/V/D, no black/bloody bm. No dysuria/frequency/discharge, No headache. No Dizziness.    No numbness/tingling/weakness.

## 2021-11-21 NOTE — H&P ADULT - HISTORY OF PRESENT ILLNESS
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  Pt reports chronic right that began as a blister in that area and has not completely healed.  Pt reports several days of redness, swelling and sensation of tightness in right foot and ankle for the past several days for which he was started on clindamycin on 11/18.  Pt reports minimal improvement of above symptoms on the antibiotic and states he developed a new blister on the inner side of his right great toe prompting his podiatrist to refer him to the ED.  Labs notable for leukocytosis, hyponatremia, hyperglycemia, elevated inflammatory markers.  Of note, pt reports being previously on metformin which he self d/c’ed due to adverse GI effects.  He states that he has not seen a PMD in about 3 years but states he has been trying to establish care with one.    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  Pt reports chronic right that began as a blister in that area and has not completely healed.  Pt reports several days of redness, swelling and sensation of tightness in right foot and ankle for the past several days for which he was started on clindamycin on 11/18.  Pt reports minimal improvement of above symptoms on the antibiotic and states he developed a new blister on the inner side of his right great toe prompting his podiatrist to refer him to the ED.  Labs notable for leukocytosis, hyponatremia, hyperglycemia, elevated inflammatory markers.  Of note, pt reports being previously on metformin which he self d/c’ed due to adverse GI effects.  He states that he has not seen a PMD in about 3 years but states he has been trying to establish care with one. 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  Pt reports chronic right that began as a blister in that area and has not completely healed.  Pt reports several days of redness, swelling and sensation of tightness in right foot and ankle for the past several days for which he was started on clindamycin a few days ago on 11/18/21.  Pt reports minimal improvement of above symptoms on the antibiotic and states he developed a new blister on the inner side of his right great toe prompting his podiatrist to refer him to the ED.  Labs notable for leukocytosis, hyponatremia, hyperglycemia, elevated inflammatory markers.  Of note, pt reports being previously on metformin which he self d/c’ed due to adverse GI effects.  He states that he has not seen a PMD in about 3 years but states he has been trying to establish care with one.

## 2021-11-21 NOTE — H&P ADULT - ASSESSMENT
Appt confirmed for covid swab on Nov 9 at 0945. Instructions provided, verbalizes understanding. 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 TMA and I&D washout, possibly tomorrow  -NPO after midnight in case of procedure  -Received clindamycin and 2.6L in ED  -Will change to vancomycin and aztreonam for polymicrobial coverage given DM hx  -Wound culture sent and pending  -X ray of both feet performed, follow up report, concern for soft tissue emphysema as per podiatry resident  -CT R-foot ordered by podiatry team  -Vascular consulted as per podiatry recommendations  -Pain control with Tylenol and IV Toradol PRN  -Baseline EKG shows NSR  -Repeat labs show improvement in Na and normalization of K which had been hemolyzed   -Pt currently medical optimized for surgery    Leukocytosis  -Due to above infection, trend CBC    Hyponatremia  -Likely hypovolemic in etiology, improved after 2.6L given in ED  -Will start maintenance IVF with NS at 75cc/hr x 14 hr  -Trend BMP    Uncontrolled DM with hyperglycemia  -Previously on metformin, self d/c a few years ago due to adverse GI effects  -Received 10U of regular insulin   -Ordered HgbA1c, resulted at 12  -Moderate intensity sliding scale for now  -Will likely require basal insulin during hospitalization and at time of d/c, held off on starting now due to NPO status with unclear time for procedure    Prophylactic measure  -Intermittent pneumatic compressions  -No pharmacologic AC due to tentative plan for podiatry intervention

## 2021-11-21 NOTE — CONSULT NOTE ADULT - SUBJECTIVE AND OBJECTIVE BOX
· Chief Complaint: The patient is a 55y Male complaining of wound check.  · HPI Objective Statement: 54 y/o male hx dm (no longer taking meds trying to control with diet), chronic right foot ulcer sent by Dr. Jain for iv abx and admission due to worsening foot ulcer. pt on oral clindamycin for several days, blister popped and having red streaking to foot. denies pain, no f/c, no other complaints. anaphylaxis to penicillin per pt.     	ROS: No fever/chills. No eye pain/changes in vision, No ear pain/sore throat/dysphagia, No chest pain/palpitations. No SOB/cough/. No abdominal pain, N/V/D, no black/bloody bm. No dysuria/frequency/discharge, No headache. No Dizziness.    No numbness/tingling/weakness.      Podiatry HPI: Patient is a 51M PMH DM, h/o gout, sent in by Dr. 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 Patient states that wound was getting worse and was seen in office on thursday and received po abx. Patient states that today he noticed a new blidter form on the inner side of his right big toe and decided to come to ED after calling Dr. Jain. Patient states he has no pain and no sensation to foot. He states that he has no other pedal complaints. Patient understands he may need amputation of the right big toe. Patient denies any fever, shares that on admission it was 98.7. Denies nausea, vomitting, sob, chills, chest pain     Patient admits to  (-) Fevers, (-) Chills, (-) Nausea, (-) Vomiting, (-) Shortness of Breath (-) calf pain (-) chest pain         FH,   PMH   PSH    Labs                          13.9   15.99 )-----------( 416      ( 21 Nov 2021 17:37 )             40.6      11-21    128<L>  |  92<L>  |  14.3  ----------------------------<  298<H>  5.5<H>   |  18.0<L>  |  0.98    Ca    9.7      21 Nov 2021 17:37    TPro  8.9<H>  /  Alb  3.4  /  TBili  0.9  /  DBili  x   /  AST  21  /  ALT  15  /  AlkPhos  106  11-21     Vital Signs Last 24 Hrs  T(C): 36.9 (21 Nov 2021 19:45), Max: 37.5 (21 Nov 2021 15:32)  T(F): 98.5 (21 Nov 2021 19:45), Max: 99.5 (21 Nov 2021 15:32)  HR: 83 (21 Nov 2021 19:45) (83 - 97)  BP: 115/71 (21 Nov 2021 19:45) (115/71 - 122/80)  BP(mean): --  RR: 18 (21 Nov 2021 19:45) (18 - 18)  SpO2: 99% (21 Nov 2021 19:45) (98% - 99%)  Sedimentation Rate, Erythrocyte: 44 mm/hr (11-21-21 @ 17:37)         C-Reactive Protein, Serum: 173 mg/L (11-21-21 @ 17:37)   WBC Count: 15.99 K/uL *H* (11-21-21 @ 17:37)        Physical exam   Patient resting comfortably in bed. Patient is AAOx3, ambulation status: walking without limitations     Derm: right hallux plantar wound about 8ygh0am +PTB, serous drainage, no malodor, no purulence, no fluctuance. right submet 5 ulcer with serous drainage does not probe to bone. Grnular wound base. erythema noted with ischemic changes to distal hallux  Vasc: DP Palpable, PT nonpalpable right, DP and PT palpable to left. edema noted to right hallux Skin temperature noted to be warm to warm from proximal to distal b/l.   Neuro: Protective and epicritic sensation grossly absent  Musc: - negative crepitance, minor discomfort on palpation at interspace 1 area of soft tissue emphysema suspicioun. no fluctuance on palpation. s/p partial 5th amp on right, sp partial 1st amp on left    xrays 11/21: official read pending, possible soft tissue emphysema noted to right foot first interspace. chronic OM changes to medial distal hallux         A:  right foot osteomyelitis r/o abscess      P:  Patient seen and evaluated   Chart reviewed   xrays reviewed- suspicious soft tissue emphysema, upon clinical exam no crepitance, no fluctuance  patient afebrile  leukocytosis noted   explored wound with curved hemostat, no purulence noted. With patient verbal consent, small stab incision was made with 15 blade at area of suspicion and explored. No purulence, no clinical gas appreciated upon incision or exploration   Order Stat CT right foot   Patient to be NPO midnight, possible OR tomorrow for add on for 1st partial ray amp vs tma and i&d washout   Patient to be NWB to right LE  Ordered culture, pending   Covid egative 11/21  Dressed with betadine DSD and ace   Podiatry to follow while in house   Discussed with attending Dr. Jain and Dr. Zendejas      · Chief Complaint: The patient is a 55y Male complaining of wound check.  · HPI Objective Statement: 54 y/o male hx dm (no longer taking meds trying to control with diet), chronic right foot ulcer sent by Dr. Jain for iv abx and admission due to worsening foot ulcer. pt on oral clindamycin for several days, blister popped and having red streaking to foot. denies pain, no f/c, no other complaints. anaphylaxis to penicillin per pt.     	ROS: No fever/chills. No eye pain/changes in vision, No ear pain/sore throat/dysphagia, No chest pain/palpitations. No SOB/cough/. No abdominal pain, N/V/D, no black/bloody bm. No dysuria/frequency/discharge, No headache. No Dizziness.    No numbness/tingling/weakness.      Podiatry HPI: Patient is a 51M PMH DM, h/o gout, sent in by Dr. 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 Patient states that wound was getting worse and was seen in office on thursday and received po abx. Patient states that today he noticed a new blidter form on the inner side of his right big toe and decided to come to ED after calling Dr. Jain. Patient states he has no pain and no sensation to foot. He states that he has no other pedal complaints. Patient understands he may need amputation of the right big toe. Patient denies any fever, shares that on admission it was 98.7. Denies nausea, vomitting, sob, chills, chest pain     Patient admits to  (-) Fevers, (-) Chills, (-) Nausea, (-) Vomiting, (-) Shortness of Breath (-) calf pain (-) chest pain         FH,   PMH   PSH    Labs                          13.9   15.99 )-----------( 416      ( 21 Nov 2021 17:37 )             40.6      11-21    128<L>  |  92<L>  |  14.3  ----------------------------<  298<H>  5.5<H>   |  18.0<L>  |  0.98    Ca    9.7      21 Nov 2021 17:37    TPro  8.9<H>  /  Alb  3.4  /  TBili  0.9  /  DBili  x   /  AST  21  /  ALT  15  /  AlkPhos  106  11-21     Vital Signs Last 24 Hrs  T(C): 36.9 (21 Nov 2021 19:45), Max: 37.5 (21 Nov 2021 15:32)  T(F): 98.5 (21 Nov 2021 19:45), Max: 99.5 (21 Nov 2021 15:32)  HR: 83 (21 Nov 2021 19:45) (83 - 97)  BP: 115/71 (21 Nov 2021 19:45) (115/71 - 122/80)  BP(mean): --  RR: 18 (21 Nov 2021 19:45) (18 - 18)  SpO2: 99% (21 Nov 2021 19:45) (98% - 99%)  Sedimentation Rate, Erythrocyte: 44 mm/hr (11-21-21 @ 17:37)         C-Reactive Protein, Serum: 173 mg/L (11-21-21 @ 17:37)   WBC Count: 15.99 K/uL *H* (11-21-21 @ 17:37)        Physical exam   Patient resting comfortably in bed. Patient is AAOx3, ambulation status: walking without limitations     Derm: right hallux plantar wound about [id number] +PTB, serous drainage, no malodor, no purulence, no fluctuance. right submet 5 ulcer with serous drainage does not probe to bone. Grnular wound base. erythema noted with ischemic changes to distal hallux  Vasc: DP Palpable, PT nonpalpable right, DP and PT palpable to left. edema noted to right hallux Skin temperature noted to be warm to warm from proximal to distal b/l.   Neuro: Protective and epicritic sensation grossly absent  Musc: - negative crepitance, minor discomfort on palpation at interspace 1 area of soft tissue emphysema suspicioun. no fluctuance on palpation. s/p partial 5th amp on right, sp partial 1st amp on left    xrays 11/21: official read pending, possible soft tissue emphysema noted to right foot first interspace. chronic OM changes to medial distal hallux         A:  right foot osteomyelitis r/o abscess      P:  Patient seen and evaluated   Chart reviewed   xrays reviewed- suspicious soft tissue emphysema, upon clinical exam no crepitance, no fluctuance  patient afebrile  leukocytosis noted   explored wound with curved hemostat, no purulence noted. With patient verbal consent, small stab incision was made with 15 blade at area of suspicion and explored. No purulence, no clinical gas appreciated upon incision or exploration   Ordered Stat CT right foot   Recommend Vascular consult - appreciate prior to OR   Request medical clearance   Patient to be NPO midnight, possible OR tomorrow for add on for 1st partial ray amp vs tma and i&d washout   Patient to be NWB to right LE  Ordered culture, pending   Covid negative 11/21  Dressed with betadine DSD and ace   Podiatry to follow while in house   Discussed with attending Dr. Jain and Dr. Zendejas

## 2021-11-22 ENCOUNTER — RESULT REVIEW (OUTPATIENT)
Age: 56
End: 2021-11-22

## 2021-11-22 DIAGNOSIS — Z89.429 ACQUIRED ABSENCE OF OTHER TOE(S), UNSPECIFIED SIDE: Chronic | ICD-10-CM

## 2021-11-22 LAB
A1C WITH ESTIMATED AVERAGE GLUCOSE RESULT: 12.9 % — HIGH (ref 4–5.6)
ANION GAP SERPL CALC-SCNC: 13 MMOL/L — SIGNIFICANT CHANGE UP (ref 5–17)
BUN SERPL-MCNC: 11.5 MG/DL — SIGNIFICANT CHANGE UP (ref 8–20)
CALCIUM SERPL-MCNC: 8.2 MG/DL — LOW (ref 8.6–10.2)
CHLORIDE SERPL-SCNC: 100 MMOL/L — SIGNIFICANT CHANGE UP (ref 98–107)
CO2 SERPL-SCNC: 19 MMOL/L — LOW (ref 22–29)
COVID-19 NUCLEOCAPSID GAM AB INTERP: POSITIVE
COVID-19 NUCLEOCAPSID TOTAL GAM ANTIBODY RESULT: 59.6 INDEX — HIGH
COVID-19 SPIKE DOMAIN AB INTERP: POSITIVE
COVID-19 SPIKE DOMAIN ANTIBODY RESULT: >250 U/ML — HIGH
CREAT SERPL-MCNC: 0.91 MG/DL — SIGNIFICANT CHANGE UP (ref 0.5–1.3)
CULTURE RESULTS: NO GROWTH — SIGNIFICANT CHANGE UP
ESTIMATED AVERAGE GLUCOSE: 324 MG/DL — HIGH (ref 68–114)
GLUCOSE BLDC GLUCOMTR-MCNC: 245 MG/DL — HIGH (ref 70–99)
GLUCOSE BLDC GLUCOMTR-MCNC: 260 MG/DL — HIGH (ref 70–99)
GLUCOSE BLDC GLUCOMTR-MCNC: 262 MG/DL — HIGH (ref 70–99)
GLUCOSE BLDC GLUCOMTR-MCNC: 273 MG/DL — HIGH (ref 70–99)
GLUCOSE SERPL-MCNC: 279 MG/DL — HIGH (ref 70–99)
POTASSIUM SERPL-MCNC: 4.3 MMOL/L — SIGNIFICANT CHANGE UP (ref 3.5–5.3)
POTASSIUM SERPL-SCNC: 4.3 MMOL/L — SIGNIFICANT CHANGE UP (ref 3.5–5.3)
SARS-COV-2 IGG+IGM SERPL QL IA: 59.6 INDEX — HIGH
SARS-COV-2 IGG+IGM SERPL QL IA: >250 U/ML — HIGH
SARS-COV-2 IGG+IGM SERPL QL IA: POSITIVE
SARS-COV-2 IGG+IGM SERPL QL IA: POSITIVE
SODIUM SERPL-SCNC: 132 MMOL/L — LOW (ref 135–145)
SPECIMEN SOURCE: SIGNIFICANT CHANGE UP

## 2021-11-22 PROCEDURE — 88305 TISSUE EXAM BY PATHOLOGIST: CPT | Mod: 26

## 2021-11-22 PROCEDURE — 99223 1ST HOSP IP/OBS HIGH 75: CPT

## 2021-11-22 PROCEDURE — 99233 SBSQ HOSP IP/OBS HIGH 50: CPT

## 2021-11-22 PROCEDURE — 73701 CT LOWER EXTREMITY W/DYE: CPT | Mod: 26,RT

## 2021-11-22 PROCEDURE — 73630 X-RAY EXAM OF FOOT: CPT | Mod: 26,RT

## 2021-11-22 PROCEDURE — 88311 DECALCIFY TISSUE: CPT | Mod: 26

## 2021-11-22 RX ORDER — SODIUM CHLORIDE 9 MG/ML
1000 INJECTION, SOLUTION INTRAVENOUS
Refills: 0 | Status: DISCONTINUED | OUTPATIENT
Start: 2021-11-22 | End: 2021-11-27

## 2021-11-22 RX ORDER — INSULIN GLARGINE 100 [IU]/ML
15 INJECTION, SOLUTION SUBCUTANEOUS AT BEDTIME
Refills: 0 | Status: DISCONTINUED | OUTPATIENT
Start: 2021-11-22 | End: 2021-11-22

## 2021-11-22 RX ORDER — DEXTROSE 50 % IN WATER 50 %
25 SYRINGE (ML) INTRAVENOUS ONCE
Refills: 0 | Status: DISCONTINUED | OUTPATIENT
Start: 2021-11-22 | End: 2021-11-27

## 2021-11-22 RX ORDER — SODIUM CHLORIDE 9 MG/ML
1000 INJECTION INTRAMUSCULAR; INTRAVENOUS; SUBCUTANEOUS
Refills: 0 | Status: DISCONTINUED | OUTPATIENT
Start: 2021-11-22 | End: 2021-11-22

## 2021-11-22 RX ORDER — ACETAMINOPHEN 500 MG
650 TABLET ORAL EVERY 6 HOURS
Refills: 0 | Status: DISCONTINUED | OUTPATIENT
Start: 2021-11-22 | End: 2021-11-27

## 2021-11-22 RX ORDER — DEXTROSE 50 % IN WATER 50 %
12.5 SYRINGE (ML) INTRAVENOUS ONCE
Refills: 0 | Status: DISCONTINUED | OUTPATIENT
Start: 2021-11-22 | End: 2021-11-27

## 2021-11-22 RX ORDER — SODIUM HYPOCHLORITE 0.125 %
1 SOLUTION, NON-ORAL MISCELLANEOUS ONCE
Refills: 0 | Status: DISCONTINUED | OUTPATIENT
Start: 2021-11-22 | End: 2021-11-27

## 2021-11-22 RX ORDER — INSULIN GLARGINE 100 [IU]/ML
15 INJECTION, SOLUTION SUBCUTANEOUS AT BEDTIME
Refills: 0 | Status: DISCONTINUED | OUTPATIENT
Start: 2021-11-22 | End: 2021-11-26

## 2021-11-22 RX ORDER — INSULIN LISPRO 100/ML
VIAL (ML) SUBCUTANEOUS
Refills: 0 | Status: DISCONTINUED | OUTPATIENT
Start: 2021-11-22 | End: 2021-11-27

## 2021-11-22 RX ORDER — VANCOMYCIN HCL 1 G
1000 VIAL (EA) INTRAVENOUS EVERY 12 HOURS
Refills: 0 | Status: DISCONTINUED | OUTPATIENT
Start: 2021-11-22 | End: 2021-11-24

## 2021-11-22 RX ORDER — INSULIN LISPRO 100/ML
VIAL (ML) SUBCUTANEOUS AT BEDTIME
Refills: 0 | Status: DISCONTINUED | OUTPATIENT
Start: 2021-11-22 | End: 2021-11-27

## 2021-11-22 RX ORDER — FENTANYL CITRATE 50 UG/ML
25 INJECTION INTRAVENOUS
Refills: 0 | Status: DISCONTINUED | OUTPATIENT
Start: 2021-11-22 | End: 2021-11-27

## 2021-11-22 RX ORDER — GLUCAGON INJECTION, SOLUTION 0.5 MG/.1ML
1 INJECTION, SOLUTION SUBCUTANEOUS ONCE
Refills: 0 | Status: DISCONTINUED | OUTPATIENT
Start: 2021-11-22 | End: 2021-11-27

## 2021-11-22 RX ORDER — MEROPENEM 1 G/30ML
1000 INJECTION INTRAVENOUS EVERY 8 HOURS
Refills: 0 | Status: DISCONTINUED | OUTPATIENT
Start: 2021-11-22 | End: 2021-11-26

## 2021-11-22 RX ORDER — SODIUM CHLORIDE 9 MG/ML
1000 INJECTION INTRAMUSCULAR; INTRAVENOUS; SUBCUTANEOUS
Refills: 0 | Status: DISCONTINUED | OUTPATIENT
Start: 2021-11-22 | End: 2021-11-26

## 2021-11-22 RX ORDER — SODIUM CHLORIDE 9 MG/ML
1000 INJECTION, SOLUTION INTRAVENOUS
Refills: 0 | Status: DISCONTINUED | OUTPATIENT
Start: 2021-11-22 | End: 2021-11-26

## 2021-11-22 RX ORDER — INFLUENZA VIRUS VACCINE 15; 15; 15; 15 UG/.5ML; UG/.5ML; UG/.5ML; UG/.5ML
0.5 SUSPENSION INTRAMUSCULAR ONCE
Refills: 0 | Status: DISCONTINUED | OUTPATIENT
Start: 2021-11-22 | End: 2021-11-27

## 2021-11-22 RX ORDER — ONDANSETRON 8 MG/1
4 TABLET, FILM COATED ORAL ONCE
Refills: 0 | Status: DISCONTINUED | OUTPATIENT
Start: 2021-11-22 | End: 2021-11-27

## 2021-11-22 RX ORDER — DEXTROSE 50 % IN WATER 50 %
15 SYRINGE (ML) INTRAVENOUS ONCE
Refills: 0 | Status: DISCONTINUED | OUTPATIENT
Start: 2021-11-22 | End: 2021-11-27

## 2021-11-22 RX ORDER — MEROPENEM 1 G/30ML
1000 INJECTION INTRAVENOUS EVERY 8 HOURS
Refills: 0 | Status: DISCONTINUED | OUTPATIENT
Start: 2021-11-22 | End: 2021-11-22

## 2021-11-22 RX ADMIN — Medication 100 MILLIGRAM(S): at 23:10

## 2021-11-22 RX ADMIN — Medication 100 MILLIGRAM(S): at 13:57

## 2021-11-22 RX ADMIN — Medication 250 MILLIGRAM(S): at 10:43

## 2021-11-22 RX ADMIN — INSULIN GLARGINE 15 UNIT(S): 100 INJECTION, SOLUTION SUBCUTANEOUS at 22:27

## 2021-11-22 RX ADMIN — Medication 4: at 11:29

## 2021-11-22 RX ADMIN — Medication 100 MILLIGRAM(S): at 05:27

## 2021-11-22 RX ADMIN — SODIUM CHLORIDE 75 MILLILITER(S): 9 INJECTION INTRAMUSCULAR; INTRAVENOUS; SUBCUTANEOUS at 22:29

## 2021-11-22 RX ADMIN — Medication 6: at 09:30

## 2021-11-22 RX ADMIN — SODIUM CHLORIDE 75 MILLILITER(S): 9 INJECTION INTRAMUSCULAR; INTRAVENOUS; SUBCUTANEOUS at 04:27

## 2021-11-22 RX ADMIN — Medication 2: at 22:27

## 2021-11-22 NOTE — PROGRESS NOTE ADULT - SUBJECTIVE AND OBJECTIVE BOX
[facility] Division of Hospital Medicine    Chief Complaint:  diabetic foot infection    SUBJECTIVE: reports no pain in foot, feels ok    OVERNIGHT EVENTS: none reported     Patient denies chest pain, SOB, abd pain, N/V, fever, chills, dysuria or any other complaints. All remainder ROS negative.     MEDICATIONS  (STANDING):  aztreonam  IVPB 2000 milliGRAM(s) IV Intermittent every 8 hours  dextrose 40% Gel 15 Gram(s) Oral once  dextrose 5%. 1000 milliLiter(s) (50 mL/Hr) IV Continuous <Continuous>  dextrose 5%. 1000 milliLiter(s) (100 mL/Hr) IV Continuous <Continuous>  dextrose 50% Injectable 25 Gram(s) IV Push once  dextrose 50% Injectable 12.5 Gram(s) IV Push once  dextrose 50% Injectable 25 Gram(s) IV Push once  glucagon  Injectable 1 milliGRAM(s) IntraMuscular once  influenza   Vaccine 0.5 milliLiter(s) IntraMuscular once  insulin lispro (ADMELOG) corrective regimen sliding scale   SubCutaneous three times a day before meals  insulin lispro (ADMELOG) corrective regimen sliding scale   SubCutaneous at bedtime  lidocaine 1% (Preservative-free) Injectable 20 milliLiter(s) Local Injection once  sodium chloride 0.9%. 1000 milliLiter(s) (75 mL/Hr) IV Continuous <Continuous>  vancomycin  IVPB 1000 milliGRAM(s) IV Intermittent every 12 hours    MEDICATIONS  (PRN):  acetaminophen     Tablet .. 650 milliGRAM(s) Oral every 6 hours PRN Temp greater or equal to 38C (100.4F), Mild Pain (1 - 3), Moderate Pain (4 - 6)  ketorolac   Injectable 15 milliGRAM(s) IV Push every 6 hours PRN Severe Pain (7 - 10)        I&O's Summary      PHYSICAL EXAM:  Vital Signs Last 24 Hrs  T(C): 36.9 (2021 11:49), Max: 37.5 (2021 15:32)  T(F): 98.4 (2021 11:49), Max: 99.5 (2021 15:32)  HR: 75 (2021 11:49) (70 - 97)  BP: 126/82 (2021 11:49) (108/70 - 126/82)  BP(mean): --  RR: 18 (2021 11:49) (16 - 18)  SpO2: 97% (2021 11:49) (94% - 99%)        CONSTITUTIONAL: NAD, well-developed, well-groomed  ENMT: Moist oral mucosa, no pharyngeal injection or exudates; normal dentition; No JVD  RESPIRATORY: Normal respiratory effort; lungs are clear to auscultation bilaterally  CARDIOVASCULAR: Regular rate and rhythm, normal S1 and S2, no murmur/rub/gallop; No lower extremity edema; Peripheral pulses are 2+ bilaterally  ABDOMEN: Nontender to palpation, normoactive bowel sounds, no rebound/guarding; No hepatosplenomegaly  MUSCLOSKELETAL:  no clubbing or cyanosis of digits; no joint swelling or tenderness to palpation  PSYCH: A+O to person, place, and time; affect appropriate  NEUROLOGY: CN 2-12 are intact and symmetric; no gross sensory deficits; was observed moving all 4 ext against gravity cooperating with exam.   SKIN: R foot dressed by podiatry team, was not able to assess, L foot with old calus on plantar hallux     LABS:                        13.9   15.99 )-----------( 416      ( 2021 17:37 )             40.6     11-22    132<L>  |  100  |  11.5  ----------------------------<  279<H>  4.3   |  19.0<L>  |  0.91    Ca    8.2<L>      2021 00:45    TPro  8.9<H>  /  Alb  3.4  /  TBili  0.9  /  DBili  x   /  AST  21  /  ALT  15  /  AlkPhos  106  11-21    PT/INR - ( 2021 17:37 )   PT: 14.8 sec;   INR: 1.29 ratio         PTT - ( 2021 17:37 )  PTT:26.8 sec      Urinalysis Basic - ( 2021 17:45 )    Color: Yellow / Appearance: Clear / S.015 / pH: x  Gluc: x / Ketone: Moderate  / Bili: Negative / Urobili: Negative mg/dL   Blood: x / Protein: 30 mg/dL / Nitrite: Negative   Leuk Esterase: Negative / RBC: 0-2 /HPF / WBC 0-2   Sq Epi: x / Non Sq Epi: Occasional / Bacteria: Occasional        CAPILLARY BLOOD GLUCOSE      POCT Blood Glucose.: 245 mg/dL (2021 11:23)  POCT Blood Glucose.: 260 mg/dL (2021 07:40)  POCT Blood Glucose.: 149 mg/dL (2021 23:02)  POCT Blood Glucose.: 142 mg/dL (2021 19:16)        RADIOLOGY & ADDITIONAL TESTS:  Results Reviewed:   Imaging Personally Reviewed:  Electrocardiogram Personally Reviewed:

## 2021-11-22 NOTE — CONSULT NOTE ADULT - SUBJECTIVE AND OBJECTIVE BOX
Vascular Surgery Consult    Consulting attending: Dr. Samson Ulloa    Vascular surgery consulted for this patient with diabetic foot ulcer for evaluation of perfusion. HPI as below, corroborated with patient. Patient denies any symptoms of claudication or prior vascular procedure. Does, however, have a vascular surgeon who had previously performed his amputations. Podiatry documented palpable pulses on examination. Bilateral DP palpable 2+. Warm and well perfused.     HPI:  55yoM hx DM with neuropathy, s/p partial 5th amputation on R-foot and partial 1st toe amputation on L-foot, not on any DM medications, currently with right foot ulcer for the past 2 months who was advised to go to hospital by outpatient podiatrist for worsening right foot ulcer.  Pt reports chronic right that began as a blister in that area and has not completely healed.  Pt reports several days of redness, swelling and sensation of tightness in right foot and ankle for the past several days for which he was started on clindamycin a few days ago on 21.  Pt reports minimal improvement of above symptoms on the antibiotic and states he developed a new blister on the inner side of his right great toe prompting his podiatrist to refer him to the ED.  Labs notable for leukocytosis, hyponatremia, hyperglycemia, elevated inflammatory markers.  Of note, pt reports being previously on metformin which he self d/c’ed due to adverse GI effects.  He states that he has not seen a PMD in about 3 years but states he has been trying to establish care with one. (2021 22:40)        PAST MEDICAL HISTORY:  Diabetes mellitus    H/O diabetic neuropathy          PAST SURGICAL HISTORY:  History of partial amputation of toe          MEDICATIONS:  acetaminophen     Tablet .. 650 milliGRAM(s) Oral every 6 hours PRN  aztreonam  IVPB 2000 milliGRAM(s) IV Intermittent every 8 hours  dextrose 40% Gel 15 Gram(s) Oral once  dextrose 5%. 1000 milliLiter(s) IV Continuous <Continuous>  dextrose 5%. 1000 milliLiter(s) IV Continuous <Continuous>  dextrose 50% Injectable 25 Gram(s) IV Push once  dextrose 50% Injectable 12.5 Gram(s) IV Push once  dextrose 50% Injectable 25 Gram(s) IV Push once  glucagon  Injectable 1 milliGRAM(s) IntraMuscular once  insulin lispro (ADMELOG) corrective regimen sliding scale   SubCutaneous three times a day before meals  insulin lispro (ADMELOG) corrective regimen sliding scale   SubCutaneous at bedtime  ketorolac   Injectable 15 milliGRAM(s) IV Push every 6 hours PRN  lidocaine 1% (Preservative-free) Injectable 20 milliLiter(s) Local Injection once  sodium chloride 0.9%. 1000 milliLiter(s) IV Continuous <Continuous>  vancomycin  IVPB 1000 milliGRAM(s) IV Intermittent every 12 hours        ALLERGIES:  Keflex (Other)  penicillin (Anaphylaxis)  sulfa drugs (Other)        VITALS & I/Os:  Vital Signs Last 24 Hrs  T(C): 36.9 (2021 04:28), Max: 37.5 (2021 15:32)  T(F): 98.4 (2021 04:28), Max: 99.5 (2021 15:32)  HR: 73 (2021 04:28) (73 - 97)  BP: 108/70 (2021 04:28) (108/70 - 122/80)  BP(mean): --  RR: 16 (2021 04:28) (16 - 18)  SpO2: 94% (2021 04:28) (94% - 99%)    I&O's Summary        PHYSICAL EXAM:  General: No acute distress, following commands.   Respiratory: Nonlabored, no conversational dyspnea  Cardiovascular: RRR  Abdominal: Soft, nondistended, nontender.  Extremities: Warm, Right foot with dressing in place. Left foot s/p partial amputation. no ischemic changes.   Vascular:  - RLE: 2+DP  - LLE: 2+DP      LABS:                        13.9   15.99 )-----------( 416      ( 2021 17:37 )             40.6         132<L>  |  100  |  11.5  ----------------------------<  279<H>  4.3   |  19.0<L>  |  0.91    Ca    8.2<L>      2021 00:45    TPro  8.9<H>  /  Alb  3.4  /  TBili  0.9  /  DBili  x   /  AST  21  /  ALT  15  /  AlkPhos  106      Lactate:   @ 17:35  1.80    PT/INR - ( 2021 17:37 )   PT: 14.8 sec;   INR: 1.29 ratio         PTT - ( 2021 17:37 )  PTT:26.8 sec          Urinalysis Basic - ( 2021 17:45 )    Color: Yellow / Appearance: Clear / S.015 / pH: x  Gluc: x / Ketone: Moderate  / Bili: Negative / Urobili: Negative mg/dL   Blood: x / Protein: 30 mg/dL / Nitrite: Negative   Leuk Esterase: Negative / RBC: 0-2 /HPF / WBC 0-2   Sq Epi: x / Non Sq Epi: Occasional / Bacteria: Occasional          IMAGING:

## 2021-11-22 NOTE — CONSULT NOTE ADULT - SUBJECTIVE AND OBJECTIVE BOX
[facility]  INFECTIOUS DISEASES AND INTERNAL MEDICINE at [place]  =======================================================  Svia Reynolds MD  Diplomates American Board of Internal Medicine and Infectious Diseases  Tel: [phone]      Fax: [phone]  =======================================================      [id number]  ZEINAB SCHNEIDER    CC: Patient is a 55y old  Male who presents with a chief complaint of Right foot ulcer with suspected osteomyelitis (22 Nov 2021 13:14)      55y  Male       Past Medical & Surgical Hx:  PAST MEDICAL & SURGICAL HISTORY:  Diabetes mellitus    H/O diabetic neuropathy    History of partial amputation of toe            Social Hx:    FAMILY HISTORY:  FHx: diabetes mellitus (Grandparent)        Allergies    penicillin (Anaphylaxis)  sulfa drugs (Other)    Intolerances    Keflex (Other)           REVIEW OF SYSTEMS:  CONSTITUTIONAL:  No Fever or chills  HEENT:  No diplopia or blurred vision.  No earache, sore throat or runny nose.  CARDIOVASCULAR:  No pressure, squeezing, strangling, tightness, heaviness or aching about the chest, neck, axilla or epigastrium.  RESPIRATORY:  No cough, shortness of breath  GASTROINTESTINAL:  No nausea, vomiting or diarrhea.  GENITOURINARY:  No dysuria, frequency or urgency. No Blood in urine  MUSCULOSKELETAL:  no joint aches, no muscle pain  SKIN:  No change in skin, hair or nails.  NEUROLOGIC:  No Headaches, seizures or weakness.  PSYCHIATRIC:  No disorder of thought or mood.  ENDOCRINE:  No heat or cold intolerance  HEMATOLOGICAL:  No easy bruising or bleeding.       Physical Exam:    GEN: NAD, pleasant  HEENT: normocephalic and atraumatic. EOMI. PERRL.  Anicteric  NECK: Supple.   LUNGS: Clear to auscultation.  HEART: Regular rate and rhythm without murmur.  ABDOMEN: Soft, nontender, and nondistended.  Positive bowel sounds.    : No CVA tenderness  EXTREMITIES: Without any edema.  MSK: No joint swelling  NEUROLOGIC: Cranial nerves II through XII are grossly intact. No Focal Deficits  PSYCHIATRIC: Appropriate affect .  SKIN: No Rash    Height (cm): 172.7 (11-22 @ 15:53)  Weight (kg): 86.2 (11-22 @ 15:53)  BMI (kg/m2): 28.9 (11-22 @ 15:53)  BSA (m2): 2 (11-22 @ 15:53)    Vitals:    T(F): 98.1 (22 Nov 2021 18:05), Max: 99 (22 Nov 2021 08:20)  HR: 81 (22 Nov 2021 18:15)  BP: 114/84 (22 Nov 2021 18:15)  RR: 14 (22 Nov 2021 18:15)  SpO2: 99% (22 Nov 2021 18:15) (94% - 100%)  temp max in last 48H T(F): , Max: 99.5 (11-21-21 @ 15:32)    Current Antibiotics:    Other medications:  Dakins Solution - 1/4 Strength 1 Application(s) Topical once  influenza   Vaccine 0.5 milliLiter(s) IntraMuscular once  lactated ringers. 1000 milliLiter(s) IV Continuous <Continuous>  lidocaine 1% (Preservative-free) Injectable 20 milliLiter(s) Local Injection once                            13.9   15.99 )-----------( 416      ( 21 Nov 2021 17:37 )             40.6     11-22    132<L>  |  100  |  11.5  ----------------------------<  279<H>  4.3   |  19.0<L>  |  0.91    Ca    8.2<L>      22 Nov 2021 00:45    TPro  8.9<H>  /  Alb  3.4  /  TBili  0.9  /  DBili  x   /  AST  21  /  ALT  15  /  AlkPhos  106  11-21    RECENT CULTURES:      WBC Count: 15.99 K/uL (11-21-21 @ 17:37)    Creatinine, Serum: 0.91 mg/dL (11-22-21 @ 00:45)  Creatinine, Serum: 0.98 mg/dL (11-21-21 @ 17:37)    C-Reactive Protein, Serum: 173 mg/L (11-21-21 @ 17:37)      Sedimentation Rate, Erythrocyte: 44 mm/hr (11-21-21 @ 17:37)          [facility]  INFECTIOUS DISEASES AND INTERNAL MEDICINE at [place]  =======================================================  Siva Reynolds MD  Diplomates American Board of Internal Medicine and Infectious Diseases  Tel: [phone]      Fax: [phone]  =======================================================      N-[id number]  ZEINAB SCHNEIDER    CC: Patient is a 55y old  Male who presents with a chief complaint of Right foot ulcer with suspected osteomyelitis (22 Nov 2021 13:14)      55yoM hx DM with neuropathy, s/p partial 5th amputation on R-foot and partial 1st toe amputation on L-foot, not on any DM medications, currently with right foot ulcer for the past 2 months who was advised to go to hospital by outpatient podiatrist for worsening right foot ulcer.  Pt reports chronic right that began as a blister in that area and has not completely healed.  Pt reports several days of redness, swelling and sensation of tightness in right foot and ankle for the past several days for which he was started on clindamycin a few days ago on 11/18/21.  Pt reports minimal improvement of above symptoms on the antibiotic and states he developed a new blister on the inner side of his right great toe prompting his podiatrist to refer him to the ED.  Labs notable for leukocytosis, hyponatremia, hyperglycemia, elevated inflammatory markers.  Of note, pt reports being previously on metformin which he self d/c’ed due to adverse GI effects.  He states that he has not seen a PMD in about 3 years but states he has been trying to establish care with one.      Past Medical & Surgical Hx:  PAST MEDICAL & SURGICAL HISTORY:  Diabetes mellitus    H/O diabetic neuropathy    History of partial amputation of toe            Social Hx: as per hpi    FAMILY HISTORY:  FHx: diabetes mellitus (Grandparent)        Allergies    penicillin (Anaphylaxis)  sulfa drugs (Other)    Intolerances    Keflex (Other)           REVIEW OF SYSTEMS:  CONSTITUTIONAL:  No Fever or chills  HEENT:  No diplopia or blurred vision.  No earache, sore throat or runny nose.  CARDIOVASCULAR:  No pressure, squeezing, strangling, tightness, heaviness or aching about the chest, neck, axilla or epigastrium.  RESPIRATORY:  No cough, shortness of breath  GASTROINTESTINAL:  No nausea, vomiting or diarrhea.  GENITOURINARY:  No dysuria, frequency or urgency. No Blood in urine  MUSCULOSKELETAL:  no joint aches, no muscle pain  SKIN:  No change in skin, hair or nails.  NEUROLOGIC:  No Headaches, seizures or weakness.  PSYCHIATRIC:  No disorder of thought or mood.  ENDOCRINE:  No heat or cold intolerance  HEMATOLOGICAL:  No easy bruising or bleeding.       Physical Exam:    GEN: NAD, pleasant  HEENT: normocephalic and atraumatic. EOMI. PERRL.  Anicteric  NECK: Supple.   LUNGS: Clear to auscultation.  HEART: Regular rate and rhythm without murmur.  ABDOMEN: Soft, nontender, and nondistended.  Positive bowel sounds.    : No CVA tenderness  EXTREMITIES: rt big toe +swelling erythema tenderness and ulceration on plantar rt big toe +discharge  MSK: No joint swelling  NEUROLOGIC: Cranial nerves II through XII are grossly intact. No Focal Deficits  PSYCHIATRIC: Appropriate affect .  SKIN: No Rash    Height (cm): 172.7 (11-22 @ 15:53)  Weight (kg): 86.2 (11-22 @ 15:53)  BMI (kg/m2): 28.9 (11-22 @ 15:53)  BSA (m2): 2 (11-22 @ 15:53)    Vitals:    T(F): 98.1 (22 Nov 2021 18:05), Max: 99 (22 Nov 2021 08:20)  HR: 81 (22 Nov 2021 18:15)  BP: 114/84 (22 Nov 2021 18:15)  RR: 14 (22 Nov 2021 18:15)  SpO2: 99% (22 Nov 2021 18:15) (94% - 100%)  temp max in last 48H T(F): , Max: 99.5 (11-21-21 @ 15:32)    Current Antibiotics:    Other medications:  Dakins Solution - 1/4 Strength 1 Application(s) Topical once  influenza   Vaccine 0.5 milliLiter(s) IntraMuscular once  lactated ringers. 1000 milliLiter(s) IV Continuous <Continuous>  lidocaine 1% (Preservative-free) Injectable 20 milliLiter(s) Local Injection once                            13.9   15.99 )-----------( 416      ( 21 Nov 2021 17:37 )             40.6     11-22    132<L>  |  100  |  11.5  ----------------------------<  279<H>  4.3   |  19.0<L>  |  0.91    Ca    8.2<L>      22 Nov 2021 00:45    TPro  8.9<H>  /  Alb  3.4  /  TBili  0.9  /  DBili  x   /  AST  21  /  ALT  15  /  AlkPhos  106  11-21    RECENT CULTURES:      WBC Count: 15.99 K/uL (11-21-21 @ 17:37)    Creatinine, Serum: 0.91 mg/dL (11-22-21 @ 00:45)  Creatinine, Serum: 0.98 mg/dL (11-21-21 @ 17:37)    C-Reactive Protein, Serum: 173 mg/L (11-21-21 @ 17:37)      Sedimentation Rate, Erythrocyte: 44 mm/hr (11-21-21 @ 17:37)       < from: CT Foot w/ IV Cont, Right (11.22.21 @ 11:46) >  IMPRESSION:    Phlegmon and soft tissue gas within the subcutaneous tissues about the first proximal within the plantar aspect of the first web space. Erosive changes about the interphalangeal joint of the first digit, consistent with sequela of infection. Lucent focus within the base of the proximal first phalanx, which may represent sequela of degenerative changes versus infection.    < end of copied text >

## 2021-11-22 NOTE — CONSULT NOTE ADULT - ASSESSMENT
55yoM wit poorly controlled DM presenting with right great toe ulcer, Plan for surgical intervention by podiatry. Patient has strong palpable DP pulses. Unlikely a vascular issue relating to non-healing ulcers. Patient with A1c 12.9.    Pending attending discussion.

## 2021-11-22 NOTE — PROGRESS NOTE ADULT - SUBJECTIVE AND OBJECTIVE BOX
· Chief Complaint: The patient is a 55y Male complaining of wound check.  · HPI Objective Statement: 56 y/o male hx dm (no longer taking meds trying to control with diet), chronic right foot ulcer sent by Dr. Jain for iv abx and admission due to worsening foot ulcer. pt on oral clindamycin for several days, blister popped and having red streaking to foot. denies pain, no f/c, no other complaints. anaphylaxis to penicillin per pt.     	ROS: No fever/chills. No eye pain/changes in vision, No ear pain/sore throat/dysphagia, No chest pain/palpitations. No SOB/cough/. No abdominal pain, N/V/D, no black/bloody bm. No dysuria/frequency/discharge, No headache. No Dizziness.    No numbness/tingling/weakness.      Podiatry HPI: Patient is a 51M PMH DM, h/o gout, sent in by Dr. Jain for chronic osteomyelitis of right hallux with wound. Patient states that wound was getting worse and was seen in office on Thursday and received po abx. Patient states that today he noticed a new blister form on the inner side of his right big toe and decided to come to ED after calling Dr. Jain. Patient states he has no pain and no sensation to foot. He states that he has no other pedal complaints. Patient understands he may need amputation of the right big toe. Patient denies any fever, shares that on admission it was 98.7. Denies nausea, vomiting sob, chills, chest pain     Patient admits to  (-) Fevers, (-) Chills, (-) Nausea, (-) Vomiting, (-) Shortness of Breath (-) calf pain (-) chest pain     Podiatry interval HPI: Patient seen in bed, resting comfortably .Does not complain of any worsening pain. Denies any acute overnight events. Denies any changesi n his symptoms.     Medications acetaminophen     Tablet .. 650 milliGRAM(s) Oral every 6 hours PRN  aztreonam  IVPB 2000 milliGRAM(s) IV Intermittent every 8 hours  dextrose 40% Gel 15 Gram(s) Oral once  dextrose 5%. 1000 milliLiter(s) IV Continuous <Continuous>  dextrose 5%. 1000 milliLiter(s) IV Continuous <Continuous>  dextrose 50% Injectable 25 Gram(s) IV Push once  dextrose 50% Injectable 12.5 Gram(s) IV Push once  dextrose 50% Injectable 25 Gram(s) IV Push once  glucagon  Injectable 1 milliGRAM(s) IntraMuscular once  insulin lispro (ADMELOG) corrective regimen sliding scale   SubCutaneous three times a day before meals  insulin lispro (ADMELOG) corrective regimen sliding scale   SubCutaneous at bedtime  ketorolac   Injectable 15 milliGRAM(s) IV Push every 6 hours PRN  lidocaine 1% (Preservative-free) Injectable 20 milliLiter(s) Local Injection once  sodium chloride 0.9%. 1000 milliLiter(s) IV Continuous <Continuous>  vancomycin  IVPB 1000 milliGRAM(s) IV Intermittent every 12 hours    FHFHx: diabetes mellitus (Grandparent)    ,   PMHDiabetes mellitus    H/O diabetic neuropathy       PSHHistory of partial amputation of toe        Labs                          13.9   15.99 )-----------( 416      ( 21 Nov 2021 17:37 )             40.6      11-22    132<L>  |  100  |  11.5  ----------------------------<  279<H>  4.3   |  19.0<L>  |  0.91    Ca    8.2<L>      22 Nov 2021 00:45    TPro  8.9<H>  /  Alb  3.4  /  TBili  0.9  /  DBili  x   /  AST  21  /  ALT  15  /  AlkPhos  106  11-21     Vital Signs Last 24 Hrs  T(C): 37.2 (22 Nov 2021 08:20), Max: 37.5 (21 Nov 2021 15:32)  T(F): 99 (22 Nov 2021 08:20), Max: 99.5 (21 Nov 2021 15:32)  HR: 70 (22 Nov 2021 08:20) (70 - 97)  BP: 116/78 (22 Nov 2021 08:20) (108/70 - 122/80)  BP(mean): --  RR: 18 (22 Nov 2021 08:20) (16 - 18)  SpO2: 96% (22 Nov 2021 08:20) (94% - 99%)  Sedimentation Rate, Erythrocyte: 44 mm/hr (11-21-21 @ 17:37)         C-Reactive Protein, Serum: 173 mg/L (11-21-21 @ 17:37)   WBC Count: 15.99 K/uL *H* (11-21-21 @ 17:37)      Physical exam   Patient resting comfortably in bed. Patient is AAOx3, ambulation status: walking without limitations     Derm: right hallux plantar wound about 0ddg7cc +PTB, serous drainage, no malodor, no purulence, no fluctuance. right submet 5 ulcer with serous drainage does not probe to bone. Granular wound base. erythema noted with ischemic changes to distal hallux    Right 1st interspace noted to be edematous, with small vertical linear incision noted. mildly macerated borders. No purulent drainage noted.     Vasc: DP Palpable, PT nonpalpable right, DP and PT palpable to left. edema noted to right hallux Skin temperature noted to be warm to warm from proximal to distal b/l.   Neuro: Protective and epicritic sensation grossly absent  Musc: - negative crepitance, minor discomfort on palpation at interspace 1 area of soft tissue emphysema suspicioun. no fluctuance on palpation. s/p partial 5th amp on right, sp partial 1st amp on left    xrays 11/21: official read pending, possible soft tissue emphysema noted to right foot first interspace. chronic OM changes to medial distal hallux         A:  right foot osteomyelitis r/o abscess      P:  Patient seen and evaluated   Chart reviewed   xrays reviewed- suspicious soft tissue emphysema, upon clinical exam no crepitance, no fluctuance  patient afebrile  leukocytosis noted   explored wound with curved hemostat. no purulence noted.   Pending CT R Foot  Appreciate vascular consult  Request medical clearance   Patient currently scheduled as an add on partial 1st ray amputation R foot with Dr. Jain, who is available at 3pm 11/22.   Patient to be NWB to right LE  Culture pending   Covid negative 11/21  Dressed with betadine DSD, ACE  Podiatry to follow while in house   Seen and discussed bedside with attending Dr. Jain

## 2021-11-22 NOTE — PROGRESS NOTE ADULT - ASSESSMENT
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 He is admitted for diabetic foot infection, started on abc and podiatry team consulted. Pt is to go to OR for i&d    #Diabetic foot infection c/b osteomyelitis   -c/w  vancomycin and aztreonam for polymicrobial coverage given DM hx  - ID team consulted   --CT R-foot suggestive OM  -Vascular consult noted  -Pain control with Tylenol and IV Toradol PRN  -wound care as per podiatry team    Leukocytosis  -Due to above infection, trend CBC    Hyponatremia, has improved  -Likely hypovolemic in etiology, improved after 2.6L given in ED  -c/w IVF with NS at 75cc/hr while in NPO  -Trend BMP    Uncontrolled DM with hyperglycemia  - a1c 12  - started Lantus 15 qhs, no ac for now given NPO, MDSS, will adjust insulin base on POC  - consulted Endocrinologist  - diabetic teaching  - pt is relactunt to start insulin due to concern that he will lose track  license b/c of it.    DVT ppx - SCD for now given planned surgery  code - full  Dispo - will need 3-4 days

## 2021-11-22 NOTE — CONSULT NOTE ADULT - ASSESSMENT
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  Pt reports chronic right that began as a blister in that area and has not completely healed.  Pt reports several days of redness, swelling and sensation of tightness in right foot and ankle for the past several days for which he was started on clindamycin a few days ago on 11/18/21.  Pt reports minimal improvement of above symptoms on the antibiotic and states he developed a new blister on the inner side of his right great toe prompting his podiatrist to refer him to the ED.    Labs notable for leukocytosis, hyponatremia, hyperglycemia, elevated inflammatory markers.  CT RLE with phlegmon, soft tissue gas and suspicious for OM. Plan for OR today    Diabetic foot infection/OM/gas gangrene  Penicillin allergy  Uncontrolled DM    - f/u BCX  - f/u Wound cX  - plan for Or today-f/u OR cx and path, may need PICC pending OR findings  - Continue vancomycina djusted for renal function, monitor trough per pharmacy protocol-dc azactam and add meropenem and clindamycin  - Trend Fever  - Trend Leukocytosis  - optimize glycemic control    d/w attending  Will Follow

## 2021-11-22 NOTE — BRIEF OPERATIVE NOTE - NSICDXBRIEFPROCEDURE_GEN_ALL_CORE_FT
PROCEDURES:  Partial amputation of first ray of right foot by open approach 22-Nov-2021 18:01:45  Thompson Roy

## 2021-11-23 LAB
ANION GAP SERPL CALC-SCNC: 11 MMOL/L — SIGNIFICANT CHANGE UP (ref 5–17)
BUN SERPL-MCNC: 8.9 MG/DL — SIGNIFICANT CHANGE UP (ref 8–20)
CALCIUM SERPL-MCNC: 8.4 MG/DL — LOW (ref 8.6–10.2)
CHLORIDE SERPL-SCNC: 98 MMOL/L — SIGNIFICANT CHANGE UP (ref 98–107)
CHOLEST SERPL-MCNC: 123 MG/DL — SIGNIFICANT CHANGE UP
CO2 SERPL-SCNC: 23 MMOL/L — SIGNIFICANT CHANGE UP (ref 22–29)
CREAT SERPL-MCNC: 1.16 MG/DL — SIGNIFICANT CHANGE UP (ref 0.5–1.3)
GLUCOSE BLDC GLUCOMTR-MCNC: 166 MG/DL — HIGH (ref 70–99)
GLUCOSE BLDC GLUCOMTR-MCNC: 189 MG/DL — HIGH (ref 70–99)
GLUCOSE SERPL-MCNC: 211 MG/DL — HIGH (ref 70–99)
HCT VFR BLD CALC: 38.2 % — LOW (ref 39–50)
HDLC SERPL-MCNC: 22 MG/DL — LOW
HGB BLD-MCNC: 12.9 G/DL — LOW (ref 13–17)
LIPID PNL WITH DIRECT LDL SERPL: 83 MG/DL — SIGNIFICANT CHANGE UP
MCHC RBC-ENTMCNC: 29.3 PG — SIGNIFICANT CHANGE UP (ref 27–34)
MCHC RBC-ENTMCNC: 33.8 GM/DL — SIGNIFICANT CHANGE UP (ref 32–36)
MCV RBC AUTO: 86.6 FL — SIGNIFICANT CHANGE UP (ref 80–100)
NON HDL CHOLESTEROL: 101 MG/DL — SIGNIFICANT CHANGE UP
PLATELET # BLD AUTO: 412 K/UL — HIGH (ref 150–400)
POTASSIUM SERPL-MCNC: 4.6 MMOL/L — SIGNIFICANT CHANGE UP (ref 3.5–5.3)
POTASSIUM SERPL-SCNC: 4.6 MMOL/L — SIGNIFICANT CHANGE UP (ref 3.5–5.3)
RBC # BLD: 4.41 M/UL — SIGNIFICANT CHANGE UP (ref 4.2–5.8)
RBC # FLD: 11.6 % — SIGNIFICANT CHANGE UP (ref 10.3–14.5)
SODIUM SERPL-SCNC: 132 MMOL/L — LOW (ref 135–145)
TRIGL SERPL-MCNC: 92 MG/DL — SIGNIFICANT CHANGE UP
VANCOMYCIN TROUGH SERPL-MCNC: <4 UG/ML — LOW (ref 10–20)
WBC # BLD: 11.49 K/UL — HIGH (ref 3.8–10.5)
WBC # FLD AUTO: 11.49 K/UL — HIGH (ref 3.8–10.5)

## 2021-11-23 PROCEDURE — 99223 1ST HOSP IP/OBS HIGH 75: CPT

## 2021-11-23 PROCEDURE — 99233 SBSQ HOSP IP/OBS HIGH 50: CPT

## 2021-11-23 RX ORDER — INSULIN LISPRO 100/ML
4 VIAL (ML) SUBCUTANEOUS
Refills: 0 | Status: DISCONTINUED | OUTPATIENT
Start: 2021-11-23 | End: 2021-11-26

## 2021-11-23 RX ADMIN — MEROPENEM 100 MILLIGRAM(S): 1 INJECTION INTRAVENOUS at 00:00

## 2021-11-23 RX ADMIN — MEROPENEM 100 MILLIGRAM(S): 1 INJECTION INTRAVENOUS at 15:42

## 2021-11-23 RX ADMIN — Medication 100 MILLIGRAM(S): at 13:59

## 2021-11-23 RX ADMIN — INSULIN GLARGINE 15 UNIT(S): 100 INJECTION, SOLUTION SUBCUTANEOUS at 21:08

## 2021-11-23 RX ADMIN — Medication 4 UNIT(S): at 17:00

## 2021-11-23 RX ADMIN — Medication 250 MILLIGRAM(S): at 10:46

## 2021-11-23 RX ADMIN — Medication 2: at 11:59

## 2021-11-23 RX ADMIN — Medication 100 MILLIGRAM(S): at 06:10

## 2021-11-23 RX ADMIN — Medication 100 MILLIGRAM(S): at 21:08

## 2021-11-23 RX ADMIN — MEROPENEM 100 MILLIGRAM(S): 1 INJECTION INTRAVENOUS at 21:08

## 2021-11-23 RX ADMIN — Medication 2: at 17:00

## 2021-11-23 RX ADMIN — Medication 250 MILLIGRAM(S): at 22:40

## 2021-11-23 RX ADMIN — MEROPENEM 100 MILLIGRAM(S): 1 INJECTION INTRAVENOUS at 08:56

## 2021-11-23 RX ADMIN — Medication 2: at 07:58

## 2021-11-23 RX ADMIN — SODIUM CHLORIDE 75 MILLILITER(S): 9 INJECTION, SOLUTION INTRAVENOUS at 07:59

## 2021-11-23 NOTE — PROGRESS NOTE ADULT - SUBJECTIVE AND OBJECTIVE BOX
[facility] Division of Hospital Medicine    Chief Complaint:  diabetic foot infection    SUBJECTIVE: reports no new complains, he is very reluctant to take insulin as well as Abx;      OVERNIGHT EVENTS: not reported     Patient denies chest pain, SOB, abd pain, N/V, fever, chills, dysuria or any other complaints. All remainder ROS negative.     MEDICATIONS  (STANDING):  clindamycin IVPB 900 milliGRAM(s) IV Intermittent every 8 hours  Dakins Solution - 1/4 Strength 1 Application(s) Topical once  dextrose 40% Gel 15 Gram(s) Oral once  dextrose 5%. 1000 milliLiter(s) (50 mL/Hr) IV Continuous <Continuous>  dextrose 5%. 1000 milliLiter(s) (100 mL/Hr) IV Continuous <Continuous>  dextrose 50% Injectable 25 Gram(s) IV Push once  dextrose 50% Injectable 12.5 Gram(s) IV Push once  dextrose 50% Injectable 25 Gram(s) IV Push once  glucagon  Injectable 1 milliGRAM(s) IntraMuscular once  influenza   Vaccine 0.5 milliLiter(s) IntraMuscular once  insulin glargine Injectable (LANTUS) 15 Unit(s) SubCutaneous at bedtime  insulin lispro (ADMELOG) corrective regimen sliding scale   SubCutaneous three times a day before meals  insulin lispro (ADMELOG) corrective regimen sliding scale   SubCutaneous at bedtime  lactated ringers. 1000 milliLiter(s) (75 mL/Hr) IV Continuous <Continuous>  lidocaine 1% (Preservative-free) Injectable 20 milliLiter(s) Local Injection once  meropenem  IVPB 1000 milliGRAM(s) IV Intermittent every 8 hours  sodium chloride 0.9%. 1000 milliLiter(s) (75 mL/Hr) IV Continuous <Continuous>  vancomycin  IVPB 1000 milliGRAM(s) IV Intermittent every 12 hours    MEDICATIONS  (PRN):  acetaminophen     Tablet .. 650 milliGRAM(s) Oral every 6 hours PRN Temp greater or equal to 38C (100.4F), Mild Pain (1 - 3), Moderate Pain (4 - 6)  fentaNYL    Injectable 25 MICROGram(s) IV Push every 5 minutes PRN Moderate Pain (4 - 6)  ondansetron Injectable 4 milliGRAM(s) IV Push once PRN Nausea and/or Vomiting        I&O's Summary    2021 07:01  -  2021 07:00  --------------------------------------------------------  IN: 240 mL / OUT: 400 mL / NET: -160 mL        PHYSICAL EXAM:  Vital Signs Last 24 Hrs  T(C): 37.4 (2021 11:32), Max: 37.4 (2021 11:32)  T(F): 99.3 (2021 11:32), Max: 99.3 (2021 11:32)  HR: 70 (2021 11:32) (70 - 87)  BP: 116/76 (2021 11:32) (101/70 - 125/77)  BP(mean): 81 (2021 20:00) (81 - 83)  RR: 18 (2021 11:32) (14 - 22)  SpO2: 95% (2021 11:32) (95% - 100%)        CONSTITUTIONAL: NAD, well-developed, well-groomed  ENMT: Moist oral mucosa, no pharyngeal injection or exudates; normal dentition; No JVD  RESPIRATORY: Normal respiratory effort; lungs are clear to auscultation bilaterally  CARDIOVASCULAR: Regular rate and rhythm, normal S1 and S2, no murmur/rub/gallop; No lower extremity edema; Peripheral pulses are 2+ bilaterally  ABDOMEN: Nontender to palpation, normoactive bowel sounds, no rebound/guarding; No hepatosplenomegaly  MUSCLOSKELETAL:  no clubbing or cyanosis of digits; no joint swelling or tenderness to palpation  PSYCH: A+O to person, place, and time; affect appropriate  NEUROLOGY: CN 2-12 are intact and symmetric; no gross sensory deficits; was observed moving all 4 ext against gravity cooperating with exam.   SKIN: R foot dressed by podiatry team postoperatively, was not able to assess, L foot with old callus on plantar hallux     LABS:                        12.9   11.49 )-----------( 412      ( 2021 07:42 )             38.2     11-    132<L>  |  98  |  8.9  ----------------------------<  211<H>  4.6   |  23.0  |  1.16    Ca    8.4<L>      2021 07:42    TPro  8.9<H>  /  Alb  3.4  /  TBili  0.9  /  DBili  x   /  AST  21  /  ALT  15  /  AlkPhos  106  11-21    PT/INR - ( 2021 17:37 )   PT: 14.8 sec;   INR: 1.29 ratio         PTT - ( 2021 17:37 )  PTT:26.8 sec      Urinalysis Basic - ( 2021 17:45 )    Color: Yellow / Appearance: Clear / S.015 / pH: x  Gluc: x / Ketone: Moderate  / Bili: Negative / Urobili: Negative mg/dL   Blood: x / Protein: 30 mg/dL / Nitrite: Negative   Leuk Esterase: Negative / RBC: 0-2 /HPF / WBC 0-2   Sq Epi: x / Non Sq Epi: Occasional / Bacteria: Occasional        Culture - Surgical Swab (collected 2021 01:45)  Source: .Surgical Swab right hallux wound  Preliminary Report (2021 10:47):    Numerous Enterococcus species    Moderate Alpha hemolytic strep "Susceptibilities not performed"    Culture - Urine (collected 2021 01:36)  Source: Clean Catch Clean Catch (Midstream)  Final Report (2021 20:38):    No growth      CAPILLARY BLOOD GLUCOSE      POCT Blood Glucose.: 189 mg/dL (2021 11:48)  POCT Blood Glucose.: 189 mg/dL (2021 07:33)  POCT Blood Glucose.: 262 mg/dL (2021 22:21)  POCT Blood Glucose.: 273 mg/dL (2021 20:59)        RADIOLOGY & ADDITIONAL TESTS:  Results Reviewed:   Imaging Personally Reviewed:  Electrocardiogram Personally Reviewed:

## 2021-11-23 NOTE — CHART NOTE - NSCHARTNOTEFT_GEN_A_CORE
RN called in regard to pt refusing IV vancomycin. Pt is aware of the abx usage against MRSA infection and is A&Ox3. Will discuss abx regimen with the primary team in the AM. RN called in regard to pt refusing IV vancomycin. Pt is aware of the abx usage against MRSA infection and is A&Ox3. Will relay message to primary team to re evaluate abx regimen in the AM.

## 2021-11-23 NOTE — DIETITIAN INITIAL EVALUATION ADULT. - PERTINENT LABORATORY DATA
11-23 Na132 mmol/L<L> Glu 211 mg/dL<H> K+ 4.6 mmol/L Cr  1.16 mg/dL BUN 8.9 mg/dL Phos n/a   Alb n/a   PAB n/a

## 2021-11-23 NOTE — CONSULT NOTE ADULT - REASON FOR ADMISSION
Right foot ulcer with suspected osteomyelitis

## 2021-11-23 NOTE — DIETITIAN INITIAL EVALUATION ADULT. - OTHER INFO
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 He is admitted for diabetic foot infection, started on abc and podiatry team consulted. Pt is to go to OR for i&d. Pt reported good po intake PTA and currently. Intentionally lost 20 lbs x 2 months. Provided with DM nutrition education and encouraged pt to see outside RD for further nutrition education. Denied current questions/concerns.

## 2021-11-23 NOTE — CONSULT NOTE ADULT - SUBJECTIVE AND OBJECTIVE BOX
HPI:  Patient is a 55y old  Male who presents with a chief complaint of Right foot ulcer with suspected osteomyelitis  55yoM hx DM with neuropathy, s/p partial 5th amputation on R-foot and partial 1st toe amputation on L-foot, not on any DM medications, presented  with right foot ulcer for the past 2 months who was advised to go to hospital by outpatient podiatrist for worsening right foot ulcer.  Pt reports chronic right that began as a blister in that area and has not completely healed.  Pt reports several days of redness, swelling and sensation of tightness in right foot and ankle for the past several days for which he was started on clindamycin a few days ago on 11/18/21.  Pt reported minimal improvement of above symptoms on the antibiotic and states he developed a new blister on the inner side of his right great toe prompting his podiatrist to refer him to the ED.  Labs notable for leukocytosis, hyponatremia, hyperglycemia, elevated inflammatory markers.    He has known h/o DM type 2, a1c 12.9% on admission.  He reports being previously on metformin which he self d/c’ed due to adverse GI effects.  He is very reluctant to starting insulin or any other injectable diabetic meds.    PAST MEDICAL & SURGICAL HISTORY:  Diabetes mellitus    H/O diabetic neuropathy    History of partial amputation of toe        Social History:  Works as , travels between [place] and [place]  Denies hx smoking  Social EtOH use  Repots very remote hx of illicit drug use, declines to state which drugs were used (21 Nov 2021 22:40)      FAMILY HISTORY:  FHx: diabetes mellitus (Grandparent)          Allergies    penicillin (Anaphylaxis)  sulfa drugs (Other)    Intolerances    Keflex (Other)      REVIEW OF SYSTEMS:    CONSTITUTIONAL: No fever, weight loss, or fatigue  EYES: No eye pain, visual disturbances, or discharge  ENMT:  No difficulty hearing, tinnitus, vertigo; No sinus or throat pain  NECK: No pain or stiffness  RESPIRATORY: No cough, wheezing, chills or hemoptysis; No shortness of breath  CARDIOVASCULAR: No chest pain, palpitations, dizziness, or leg swelling  GASTROINTESTINAL: No abdominal or epigastric pain. No nausea, vomiting, or hematemesis  NEUROLOGICAL: No headaches, memory loss, loss of strength, numbness, or tremors  SKIN: No itching, burning, rashes, or lesions   MUSCULOSKELETAL: No joint pain or swelling; No muscle, back, or extremity pain  PSYCHIATRIC: No depression, anxiety, mood swings, or difficulty sleeping        MEDICATIONS  (STANDING):  clindamycin IVPB 900 milliGRAM(s) IV Intermittent every 8 hours  Dakins Solution - 1/4 Strength 1 Application(s) Topical once  dextrose 40% Gel 15 Gram(s) Oral once  dextrose 5%. 1000 milliLiter(s) (50 mL/Hr) IV Continuous <Continuous>  dextrose 5%. 1000 milliLiter(s) (100 mL/Hr) IV Continuous <Continuous>  dextrose 50% Injectable 25 Gram(s) IV Push once  dextrose 50% Injectable 12.5 Gram(s) IV Push once  dextrose 50% Injectable 25 Gram(s) IV Push once  glucagon  Injectable 1 milliGRAM(s) IntraMuscular once  influenza   Vaccine 0.5 milliLiter(s) IntraMuscular once  insulin glargine Injectable (LANTUS) 15 Unit(s) SubCutaneous at bedtime  insulin lispro (ADMELOG) corrective regimen sliding scale   SubCutaneous three times a day before meals  insulin lispro (ADMELOG) corrective regimen sliding scale   SubCutaneous at bedtime  lactated ringers. 1000 milliLiter(s) (75 mL/Hr) IV Continuous <Continuous>  lidocaine 1% (Preservative-free) Injectable 20 milliLiter(s) Local Injection once  meropenem  IVPB 1000 milliGRAM(s) IV Intermittent every 8 hours  sodium chloride 0.9%. 1000 milliLiter(s) (75 mL/Hr) IV Continuous <Continuous>  vancomycin  IVPB 1000 milliGRAM(s) IV Intermittent every 12 hours    MEDICATIONS  (PRN):  acetaminophen     Tablet .. 650 milliGRAM(s) Oral every 6 hours PRN Temp greater or equal to 38C (100.4F), Mild Pain (1 - 3), Moderate Pain (4 - 6)  fentaNYL    Injectable 25 MICROGram(s) IV Push every 5 minutes PRN Moderate Pain (4 - 6)  ondansetron Injectable 4 milliGRAM(s) IV Push once PRN Nausea and/or Vomiting      Vital Signs Last 24 Hrs  T(C): 36.9 (23 Nov 2021 03:36), Max: 37.1 (22 Nov 2021 15:53)  T(F): 98.5 (23 Nov 2021 03:36), Max: 98.8 (22 Nov 2021 15:53)  HR: 79 (23 Nov 2021 03:36) (75 - 87)  BP: 118/74 (23 Nov 2021 03:36) (101/70 - 126/82)  BP(mean): 81 (22 Nov 2021 20:00) (81 - 83)  RR: 18 (23 Nov 2021 03:36) (14 - 22)  SpO2: 99% (23 Nov 2021 03:36) (97% - 100%)      PHYSICAL EXAM:    Constitutional: NAD, well-groomed, well-developed  HEENT: PERRL, EOMI, no exophalmos  Neck: No LAD, No JVD, trachea midline, no thyroid enlargement  Respiratory: CTAB  Cardiovascular: S1 and S2, RRR, no M/G/R  Gastrointestinal: BS+, soft, no organomegaly or mass  Extremities:   Neurological: A/O x 3, no focal deficits  Psychiatric: Normal mood, normal affect  Skin: No rashes, no acanthosis        LABS  11-23    132<L>  |  98  |  8.9  ----------------------------<  211<H>  4.6   |  23.0  |  1.16    Ca    8.4<L>      23 Nov 2021 07:42    TPro  8.9<H>  /  Alb  3.4  /  TBili  0.9  /  DBili  x   /  AST  21  /  ALT  15  /  AlkPhos  106  11-21                          12.9   11.49 )-----------( 412      ( 23 Nov 2021 07:42 )             38.2       A1C with Estimated Average Glucose Result: 12.9 % (11-22-21 @ 00:45)        Ketone - Urine: Moderate (11-21 @ 17:45)    Alanine Aminotransferase (ALT/SGPT): 15 U/L (11-21-21 @ 17:37)  Albumin, Serum: 3.4 g/dL (11-21-21 @ 17:37)  Aspartate Aminotransferase (AST/SGOT): 21 U/L (11-21-21 @ 17:37)  Alkaline Phosphatase, Serum: 106 U/L (11-21-21 @ 17:37)      CAPILLARY BLOOD GLUCOSE      POCT Blood Glucose.: 189 mg/dL (23 Nov 2021 07:33)  POCT Blood Glucose.: 262 mg/dL (22 Nov 2021 22:21)  POCT Blood Glucose.: 273 mg/dL (22 Nov 2021 20:59)  POCT Blood Glucose.: 245 mg/dL (22 Nov 2021 11:23)         HPI:  Patient is a 55y old  Male who presents with a chief complaint of Right foot ulcer with suspected osteomyelitis  55yoM hx DM with neuropathy, s/p partial 5th amputation on R-foot and partial 1st toe amputation on L-foot, not on any DM medications, presented  with right foot ulcer for the past 2 months who was advised to go to hospital by outpatient podiatrist for worsening right foot ulcer.  Pt reports chronic right that began as a blister in that area and has not completely healed.  Pt reports several days of redness, swelling and sensation of tightness in right foot and ankle for the past several days for which he was started on clindamycin a few days ago on 11/18/21.  Pt reported minimal improvement of above symptoms on the antibiotic and states he developed a new blister on the inner side of his right great toe prompting his podiatrist to refer him to the ED.  Labs notable for leukocytosis, hyponatremia, hyperglycemia, elevated inflammatory markers.    He has known h/o DM type 2, a1c 12.9% on admission.  He reports being  diabetic for more than 10 years, admits to being non compliant, was on metformin for some time  which he self d/c’ed due to adverse GI effects, also was on lantus for some time then stopped, was not taking anything for last 1 yr.  Also not chekcing sugars.  He is very reluctant to starting insulin or any other injectable diabetic meds.    PAST MEDICAL & SURGICAL HISTORY:  Diabetes mellitus    H/O diabetic neuropathy    History of partial amputation of toe        Social History:  Works as , travels between [place] and [place]  Denies hx smoking  Social EtOH use  Repots very remote hx of illicit drug use, declines to state which drugs were used (21 Nov 2021 22:40)      FAMILY HISTORY:  FHx: diabetes mellitus (Grandparent)          Allergies    penicillin (Anaphylaxis)  sulfa drugs (Other)    Intolerances    Keflex (Other)      REVIEW OF SYSTEMS:    CONSTITUTIONAL: No fever, weight loss, or fatigue  EYES: No eye pain, visual disturbances, or discharge  ENMT:  No difficulty hearing, tinnitus, vertigo; No sinus or throat pain  NECK: No pain or stiffness  RESPIRATORY: No cough, wheezing, chills or hemoptysis; No shortness of breath  CARDIOVASCULAR: No chest pain, palpitations, dizziness, or leg swelling  GASTROINTESTINAL: No abdominal or epigastric pain. No nausea, vomiting, or hematemesis  NEUROLOGICAL: No headaches, memory loss, loss of strength, numbness, or tremors  SKIN: No itching, burning, rashes, or lesions   MUSCULOSKELETAL: throbbing in right foot  PSYCHIATRIC: No depression, anxiety, mood swings, or difficulty sleeping        MEDICATIONS  (STANDING):  clindamycin IVPB 900 milliGRAM(s) IV Intermittent every 8 hours  Dakins Solution - 1/4 Strength 1 Application(s) Topical once  dextrose 40% Gel 15 Gram(s) Oral once  dextrose 5%. 1000 milliLiter(s) (50 mL/Hr) IV Continuous <Continuous>  dextrose 5%. 1000 milliLiter(s) (100 mL/Hr) IV Continuous <Continuous>  dextrose 50% Injectable 25 Gram(s) IV Push once  dextrose 50% Injectable 12.5 Gram(s) IV Push once  dextrose 50% Injectable 25 Gram(s) IV Push once  glucagon  Injectable 1 milliGRAM(s) IntraMuscular once  influenza   Vaccine 0.5 milliLiter(s) IntraMuscular once  insulin glargine Injectable (LANTUS) 15 Unit(s) SubCutaneous at bedtime  insulin lispro (ADMELOG) corrective regimen sliding scale   SubCutaneous three times a day before meals  insulin lispro (ADMELOG) corrective regimen sliding scale   SubCutaneous at bedtime  lactated ringers. 1000 milliLiter(s) (75 mL/Hr) IV Continuous <Continuous>  lidocaine 1% (Preservative-free) Injectable 20 milliLiter(s) Local Injection once  meropenem  IVPB 1000 milliGRAM(s) IV Intermittent every 8 hours  sodium chloride 0.9%. 1000 milliLiter(s) (75 mL/Hr) IV Continuous <Continuous>  vancomycin  IVPB 1000 milliGRAM(s) IV Intermittent every 12 hours    MEDICATIONS  (PRN):  acetaminophen     Tablet .. 650 milliGRAM(s) Oral every 6 hours PRN Temp greater or equal to 38C (100.4F), Mild Pain (1 - 3), Moderate Pain (4 - 6)  fentaNYL    Injectable 25 MICROGram(s) IV Push every 5 minutes PRN Moderate Pain (4 - 6)  ondansetron Injectable 4 milliGRAM(s) IV Push once PRN Nausea and/or Vomiting      Vital Signs Last 24 Hrs  T(C): 36.9 (23 Nov 2021 03:36), Max: 37.1 (22 Nov 2021 15:53)  T(F): 98.5 (23 Nov 2021 03:36), Max: 98.8 (22 Nov 2021 15:53)  HR: 79 (23 Nov 2021 03:36) (75 - 87)  BP: 118/74 (23 Nov 2021 03:36) (101/70 - 126/82)  BP(mean): 81 (22 Nov 2021 20:00) (81 - 83)  RR: 18 (23 Nov 2021 03:36) (14 - 22)  SpO2: 99% (23 Nov 2021 03:36) (97% - 100%)      PHYSICAL EXAM:    Constitutional: NAD, well-groomed, well-developed  HEENT: PERRL, EOMI, no exophalmos  Neck: No LAD, No JVD, trachea midline, no thyroid enlargement  Respiratory: CTAB  Cardiovascular: S1 and S2, RRR, no M/G/R  Gastrointestinal: BS+, soft, no organomegaly or mass  Extremities: right foot in bandage  Neurological: A/O x 3, no focal deficits  Psychiatric: Normal mood, normal affect  Skin: No rashes, no acanthosis        LABS  11-23    132<L>  |  98  |  8.9  ----------------------------<  211<H>  4.6   |  23.0  |  1.16    Ca    8.4<L>      23 Nov 2021 07:42    TPro  8.9<H>  /  Alb  3.4  /  TBili  0.9  /  DBili  x   /  AST  21  /  ALT  15  /  AlkPhos  106  11-21                          12.9   11.49 )-----------( 412      ( 23 Nov 2021 07:42 )             38.2       A1C with Estimated Average Glucose Result: 12.9 % (11-22-21 @ 00:45)        Ketone - Urine: Moderate (11-21 @ 17:45)    Alanine Aminotransferase (ALT/SGPT): 15 U/L (11-21-21 @ 17:37)  Albumin, Serum: 3.4 g/dL (11-21-21 @ 17:37)  Aspartate Aminotransferase (AST/SGOT): 21 U/L (11-21-21 @ 17:37)  Alkaline Phosphatase, Serum: 106 U/L (11-21-21 @ 17:37)      CAPILLARY BLOOD GLUCOSE      POCT Blood Glucose.: 189 mg/dL (23 Nov 2021 07:33)  POCT Blood Glucose.: 262 mg/dL (22 Nov 2021 22:21)  POCT Blood Glucose.: 273 mg/dL (22 Nov 2021 20:59)  POCT Blood Glucose.: 245 mg/dL (22 Nov 2021 11:23)

## 2021-11-23 NOTE — DIETITIAN INITIAL EVALUATION ADULT. - CURRENT DIET ORDER MEETS ESTIMATED NUTRIENT REQUIREMENTS
[FreeTextEntry1] : 48-year-old woman with LAP-BAND with postprandial nausea and epigastric discomfort.  Right upper quadrant ultrasound and upper GI series demonstrated no abnormalities.  Symptoms are consistent with possible reflux and gastritis.
Statement Selected

## 2021-11-23 NOTE — PROGRESS NOTE ADULT - ASSESSMENT
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 He is admitted for diabetic foot infection, started on abc and podiatry team consulted. Pt is to go to OR for i&d    #Diabetic foot infection c/b osteomyelitis   -c/w  vancomycin, nicole, clinda   - ID team, podiatry team consult noted  --CT R-foot suggestive OM as well as intraoperative findings   -Pain control with Tylenol and IV Toradol PRN  -wound care as per podiatry team    Leukocytosis  -Due to above infection, trend CBC    Hyponatremia, has improved  - bmp daily for now and hyperglycemia control as bellow    Uncontrolled DM with hyperglycemia  - a1c 12  -c/w  Lantus 15 qhs and added AC 4 units, c/w MDSS, will adjust insulin base on POC  -  Endocrinologist consult noted  - diabetic teaching  - pt is reluctant to start insulin due to concern that he will lose track  license b/c of it- educated and counseled at bed side, will continue  to educate pt    DVT ppx - SCD for today, will start Lovenox in am  code - full  Dispo - will need 3-4 days, may need OP iv abx

## 2021-11-23 NOTE — PHYSICAL THERAPY INITIAL EVALUATION ADULT - ADDITIONAL COMMENTS
Pt lives in a house with no RAFAEL and no steps inside, no DME. States he has friends to assist as needed upon discharge.

## 2021-11-24 LAB
-  AMPICILLIN: SIGNIFICANT CHANGE UP
-  TETRACYCLINE: SIGNIFICANT CHANGE UP
-  VANCOMYCIN: SIGNIFICANT CHANGE UP
ANION GAP SERPL CALC-SCNC: 14 MMOL/L — SIGNIFICANT CHANGE UP (ref 5–17)
BASOPHILS # BLD AUTO: 0.04 K/UL — SIGNIFICANT CHANGE UP (ref 0–0.2)
BASOPHILS NFR BLD AUTO: 0.5 % — SIGNIFICANT CHANGE UP (ref 0–2)
BUN SERPL-MCNC: 7.9 MG/DL — LOW (ref 8–20)
CALCIUM SERPL-MCNC: 8.8 MG/DL — SIGNIFICANT CHANGE UP (ref 8.6–10.2)
CHLORIDE SERPL-SCNC: 98 MMOL/L — SIGNIFICANT CHANGE UP (ref 98–107)
CO2 SERPL-SCNC: 23 MMOL/L — SIGNIFICANT CHANGE UP (ref 22–29)
CREAT SERPL-MCNC: 1.06 MG/DL — SIGNIFICANT CHANGE UP (ref 0.5–1.3)
EOSINOPHIL # BLD AUTO: 0.17 K/UL — SIGNIFICANT CHANGE UP (ref 0–0.5)
EOSINOPHIL NFR BLD AUTO: 2.2 % — SIGNIFICANT CHANGE UP (ref 0–6)
GLUCOSE BLDC GLUCOMTR-MCNC: 130 MG/DL — HIGH (ref 70–99)
GLUCOSE BLDC GLUCOMTR-MCNC: 143 MG/DL — HIGH (ref 70–99)
GLUCOSE BLDC GLUCOMTR-MCNC: 189 MG/DL — HIGH (ref 70–99)
GLUCOSE BLDC GLUCOMTR-MCNC: 204 MG/DL — HIGH (ref 70–99)
GLUCOSE SERPL-MCNC: 151 MG/DL — HIGH (ref 70–99)
HCT VFR BLD CALC: 40.2 % — SIGNIFICANT CHANGE UP (ref 39–50)
HGB BLD-MCNC: 13.4 G/DL — SIGNIFICANT CHANGE UP (ref 13–17)
IMM GRANULOCYTES NFR BLD AUTO: 0.7 % — SIGNIFICANT CHANGE UP (ref 0–1.5)
LYMPHOCYTES # BLD AUTO: 1.4 K/UL — SIGNIFICANT CHANGE UP (ref 1–3.3)
LYMPHOCYTES # BLD AUTO: 18.3 % — SIGNIFICANT CHANGE UP (ref 13–44)
MCHC RBC-ENTMCNC: 28.8 PG — SIGNIFICANT CHANGE UP (ref 27–34)
MCHC RBC-ENTMCNC: 33.3 GM/DL — SIGNIFICANT CHANGE UP (ref 32–36)
MCV RBC AUTO: 86.5 FL — SIGNIFICANT CHANGE UP (ref 80–100)
METHOD TYPE: SIGNIFICANT CHANGE UP
MONOCYTES # BLD AUTO: 0.84 K/UL — SIGNIFICANT CHANGE UP (ref 0–0.9)
MONOCYTES NFR BLD AUTO: 11 % — SIGNIFICANT CHANGE UP (ref 2–14)
NEUTROPHILS # BLD AUTO: 5.17 K/UL — SIGNIFICANT CHANGE UP (ref 1.8–7.4)
NEUTROPHILS NFR BLD AUTO: 67.3 % — SIGNIFICANT CHANGE UP (ref 43–77)
NIGHT BLUE STAIN TISS: SIGNIFICANT CHANGE UP
NIGHT BLUE STAIN TISS: SIGNIFICANT CHANGE UP
PLATELET # BLD AUTO: 411 K/UL — HIGH (ref 150–400)
POTASSIUM SERPL-MCNC: 4.1 MMOL/L — SIGNIFICANT CHANGE UP (ref 3.5–5.3)
POTASSIUM SERPL-SCNC: 4.1 MMOL/L — SIGNIFICANT CHANGE UP (ref 3.5–5.3)
RBC # BLD: 4.65 M/UL — SIGNIFICANT CHANGE UP (ref 4.2–5.8)
RBC # FLD: 11.6 % — SIGNIFICANT CHANGE UP (ref 10.3–14.5)
SODIUM SERPL-SCNC: 135 MMOL/L — SIGNIFICANT CHANGE UP (ref 135–145)
SPECIMEN SOURCE: SIGNIFICANT CHANGE UP
SPECIMEN SOURCE: SIGNIFICANT CHANGE UP
VANCOMYCIN TROUGH SERPL-MCNC: 7.2 UG/ML — LOW (ref 10–20)
WBC # BLD: 7.67 K/UL — SIGNIFICANT CHANGE UP (ref 3.8–10.5)
WBC # FLD AUTO: 7.67 K/UL — SIGNIFICANT CHANGE UP (ref 3.8–10.5)

## 2021-11-24 PROCEDURE — 99232 SBSQ HOSP IP/OBS MODERATE 35: CPT

## 2021-11-24 PROCEDURE — 99233 SBSQ HOSP IP/OBS HIGH 50: CPT

## 2021-11-24 RX ORDER — VANCOMYCIN HCL 1 G
1250 VIAL (EA) INTRAVENOUS EVERY 12 HOURS
Refills: 0 | Status: DISCONTINUED | OUTPATIENT
Start: 2021-11-25 | End: 2021-11-27

## 2021-11-24 RX ADMIN — Medication 2: at 11:58

## 2021-11-24 RX ADMIN — Medication 100 MILLIGRAM(S): at 05:38

## 2021-11-24 RX ADMIN — Medication 100 MILLIGRAM(S): at 21:14

## 2021-11-24 RX ADMIN — Medication 100 MILLIGRAM(S): at 15:06

## 2021-11-24 RX ADMIN — MEROPENEM 100 MILLIGRAM(S): 1 INJECTION INTRAVENOUS at 05:37

## 2021-11-24 RX ADMIN — MEROPENEM 100 MILLIGRAM(S): 1 INJECTION INTRAVENOUS at 21:14

## 2021-11-24 RX ADMIN — Medication 4 UNIT(S): at 11:59

## 2021-11-24 RX ADMIN — MEROPENEM 100 MILLIGRAM(S): 1 INJECTION INTRAVENOUS at 14:13

## 2021-11-24 RX ADMIN — INSULIN GLARGINE 15 UNIT(S): 100 INJECTION, SOLUTION SUBCUTANEOUS at 21:17

## 2021-11-24 RX ADMIN — Medication 4 UNIT(S): at 08:09

## 2021-11-24 RX ADMIN — Medication 250 MILLIGRAM(S): at 11:58

## 2021-11-24 NOTE — PROGRESS NOTE ADULT - ATTENDING COMMENTS
I have seen and examined patient with NP, agree with continuing same insulin doses for now. agree with above assessment and plan.

## 2021-11-24 NOTE — PROGRESS NOTE ADULT - SUBJECTIVE AND OBJECTIVE BOX
[facility] Division of Hospital Medicine - late entry note    Chief Complaint:  diabetic foot infection with om    SUBJECTIVE: reports no med complains    OVERNIGHT EVENTS: none reported     Patient denies chest pain, SOB, abd pain, N/V, fever, chills, dysuria or any other complaints. All remainder ROS negative.     MEDICATIONS  (STANDING):  clindamycin IVPB 900 milliGRAM(s) IV Intermittent every 8 hours  Dakins Solution - 1/4 Strength 1 Application(s) Topical once  dextrose 40% Gel 15 Gram(s) Oral once  dextrose 5%. 1000 milliLiter(s) (50 mL/Hr) IV Continuous <Continuous>  dextrose 5%. 1000 milliLiter(s) (100 mL/Hr) IV Continuous <Continuous>  dextrose 50% Injectable 25 Gram(s) IV Push once  dextrose 50% Injectable 12.5 Gram(s) IV Push once  dextrose 50% Injectable 25 Gram(s) IV Push once  glucagon  Injectable 1 milliGRAM(s) IntraMuscular once  influenza   Vaccine 0.5 milliLiter(s) IntraMuscular once  insulin glargine Injectable (LANTUS) 15 Unit(s) SubCutaneous at bedtime  insulin lispro (ADMELOG) corrective regimen sliding scale   SubCutaneous three times a day before meals  insulin lispro (ADMELOG) corrective regimen sliding scale   SubCutaneous at bedtime  insulin lispro Injectable (ADMELOG) 4 Unit(s) SubCutaneous three times a day before meals  lactated ringers. 1000 milliLiter(s) (75 mL/Hr) IV Continuous <Continuous>  lidocaine 1% (Preservative-free) Injectable 20 milliLiter(s) Local Injection once  meropenem  IVPB 1000 milliGRAM(s) IV Intermittent every 8 hours  sodium chloride 0.9%. 1000 milliLiter(s) (75 mL/Hr) IV Continuous <Continuous>    MEDICATIONS  (PRN):  acetaminophen     Tablet .. 650 milliGRAM(s) Oral every 6 hours PRN Temp greater or equal to 38C (100.4F), Mild Pain (1 - 3), Moderate Pain (4 - 6)  fentaNYL    Injectable 25 MICROGram(s) IV Push every 5 minutes PRN Moderate Pain (4 - 6)  ondansetron Injectable 4 milliGRAM(s) IV Push once PRN Nausea and/or Vomiting        I&O's Summary      PHYSICAL EXAM:  Vital Signs Last 24 Hrs  T(C): 37.1 (24 Nov 2021 10:54), Max: 37.3 (24 Nov 2021 04:49)  T(F): 98.8 (24 Nov 2021 10:54), Max: 99.1 (24 Nov 2021 04:49)  HR: 67 (24 Nov 2021 10:54) (67 - 76)  BP: 104/67 (24 Nov 2021 10:54) (104/67 - 109/65)  BP(mean): --  RR: 17 (24 Nov 2021 10:54) (17 - 18)  SpO2: 94% (24 Nov 2021 10:54) (92% - 95%)        CONSTITUTIONAL: NAD, well-developed, well-groomed  ENMT: Moist oral mucosa, no pharyngeal injection or exudates; normal dentition; No JVD  RESPIRATORY: Normal respiratory effort; lungs are clear to auscultation bilaterally  CARDIOVASCULAR: Regular rate and rhythm, normal S1 and S2, no murmur/rub/gallop; No lower extremity edema; Peripheral pulses are 2+ bilaterally  ABDOMEN: Nontender to palpation, normoactive bowel sounds, no rebound/guarding; No hepatosplenomegaly  MUSCLOSKELETAL:  no clubbing or cyanosis of digits; no joint swelling or tenderness to palpation  PSYCH: A+O to person, place, and time; affect appropriate  NEUROLOGY: CN 2-12 are intact and symmetric; no gross sensory deficits; was observed moving all 4 ext against gravity cooperating with exam.   SKIN:  R foot dressed by podiatry team and pt was reluctant to let me examined the wound     LABS:                        13.4   7.67  )-----------( 411      ( 24 Nov 2021 07:40 )             40.2     11-24    135  |  98  |  7.9<L>  ----------------------------<  151<H>  4.1   |  23.0  |  1.06    Ca    8.8      24 Nov 2021 07:40                Culture - Acid Fast - Other w/Smear (collected 23 Nov 2021 22:30)  Source: .Other Right Great Toe    Culture - Fungal, Other (collected 23 Nov 2021 22:24)  Source: .Other abcess 1st interspace right toe (swab)  Preliminary Report (24 Nov 2021 06:41):    Testing in progress    Culture - Acid Fast - Other w/Smear (collected 23 Nov 2021 22:24)  Source: .Other abcess 1st interspace right foot(swab)    Culture - Surgical Swab (collected 22 Nov 2021 01:45)  Source: .Surgical Swab right hallux wound  Preliminary Report (24 Nov 2021 14:36):    Numerous Enterococcus faecalis    Moderate Alpha hemolytic strep "Susceptibilities not performed"    Few Staphylococcus aureus  Organism: Enterococcus faecalis (24 Nov 2021 14:36)  Organism: Enterococcus faecalis (24 Nov 2021 14:36)    Culture - Urine (collected 22 Nov 2021 01:36)  Source: Clean Catch Clean Catch (Midstream)  Final Report (22 Nov 2021 20:38):    No growth    Culture - Blood (collected 21 Nov 2021 17:41)  Source: .Blood Blood-Peripheral  Preliminary Report (23 Nov 2021 19:00):    No growth at 48 hours    Culture - Blood (collected 21 Nov 2021 17:40)  Source: .Blood Blood-Peripheral  Preliminary Report (23 Nov 2021 19:00):    No growth at 48 hours      CAPILLARY BLOOD GLUCOSE      POCT Blood Glucose.: 143 mg/dL (24 Nov 2021 16:20)  POCT Blood Glucose.: 189 mg/dL (24 Nov 2021 11:43)  POCT Blood Glucose.: 130 mg/dL (24 Nov 2021 07:47)  POCT Blood Glucose.: 189 mg/dL (23 Nov 2021 20:54)        RADIOLOGY & ADDITIONAL TESTS:  Results Reviewed:   Imaging Personally Reviewed:  Electrocardiogram Personally Reviewed:

## 2021-11-24 NOTE — PROGRESS NOTE ADULT - SUBJECTIVE AND OBJECTIVE BOX
[facility]  INFECTIOUS DISEASES AND INTERNAL MEDICINE at [place]  =======================================================  Siva Reynolds MD  Diplomates American Board of Internal Medicine and Infectious Diseases  Tel: [phone]      Fax: [phone]  =======================================================    AIDEEJOSEPHZEINAB ARAGON [id number]    Follow up: OM  s/p Or  feels well        Allergies:  Keflex (Other)  penicillin (Anaphylaxis)  sulfa drugs (Other)           REVIEW OF SYSTEMS:  CONSTITUTIONAL:  No Fever or chills  HEENT:   No diplopia or blurred vision.  No earache, sore throat or runny nose.  CARDIOVASCULAR:  No pressure, squeezing, strangling, tightness, heaviness or aching about the chest, neck, axilla or epigastrium.  RESPIRATORY:  No cough, shortness of breath  GASTROINTESTINAL:  No nausea, vomiting or diarrhea.  GENITOURINARY:  No dysuria, frequency or urgency. No Blood in urine  MUSCULOSKELETAL:  no joint aches, no muscle pain  SKIN:  No change in skin, hair or nails.  NEUROLOGIC:  No Headaches, seizures or weakness.  PSYCHIATRIC:  No disorder of thought or mood.  ENDOCRINE:  No heat or cold intolerance  HEMATOLOGICAL:  No easy bruising or bleeding.       Physical Exam:  GEN: NAD, pleasant  HEENT: normocephalic and atraumatic. EOMI. PERRL.  Anicteric   NECK: Supple.   LUNGS: Clear to auscultation.  HEART: Regular rate and rhythm without murmur.  ABDOMEN: Soft, nontender, and nondistended.  Positive bowel sounds.    : No CVA tenderness  EXTREMITIES: Without any edema. rt foot dressing in place  MSK: no joint swelling  NEUROLOGIC: Cranial nerves II through XII are grossly intact. No focal deficits  PSYCHIATRIC: Appropriate affect .  SKIN: No Rash      Vitals:    T(F): 98.6 (24 Nov 2021 16:57), Max: 99.1 (24 Nov 2021 04:49)  HR: 70 (24 Nov 2021 16:57)  BP: 113/77 (24 Nov 2021 16:57)  RR: 18 (24 Nov 2021 16:57)  SpO2: 96% (24 Nov 2021 16:57) (92% - 96%)  temp max in last 48H T(F): , Max: 99.3 (11-23-21 @ 11:32)    Current Antibiotics:  clindamycin IVPB 900 milliGRAM(s) IV Intermittent every 8 hours  meropenem  IVPB 1000 milliGRAM(s) IV Intermittent every 8 hours    Other medications:  Dakins Solution - 1/4 Strength 1 Application(s) Topical once  dextrose 40% Gel 15 Gram(s) Oral once  dextrose 5%. 1000 milliLiter(s) IV Continuous <Continuous>  dextrose 5%. 1000 milliLiter(s) IV Continuous <Continuous>  dextrose 50% Injectable 25 Gram(s) IV Push once  dextrose 50% Injectable 12.5 Gram(s) IV Push once  dextrose 50% Injectable 25 Gram(s) IV Push once  glucagon  Injectable 1 milliGRAM(s) IntraMuscular once  influenza   Vaccine 0.5 milliLiter(s) IntraMuscular once  insulin glargine Injectable (LANTUS) 15 Unit(s) SubCutaneous at bedtime  insulin lispro (ADMELOG) corrective regimen sliding scale   SubCutaneous three times a day before meals  insulin lispro (ADMELOG) corrective regimen sliding scale   SubCutaneous at bedtime  insulin lispro Injectable (ADMELOG) 4 Unit(s) SubCutaneous three times a day before meals  lactated ringers. 1000 milliLiter(s) IV Continuous <Continuous>  lidocaine 1% (Preservative-free) Injectable 20 milliLiter(s) Local Injection once  sodium chloride 0.9%. 1000 milliLiter(s) IV Continuous <Continuous>                            13.4   7.67  )-----------( 411      ( 24 Nov 2021 07:40 )             40.2     11-24    135  |  98  |  7.9<L>  ----------------------------<  151<H>  4.1   |  23.0  |  1.06    Ca    8.8      24 Nov 2021 07:40      RECENT CULTURES:  11-23 @ 22:30 .Other Right Great Toe           11-23 @ 22:24 .Other abcess 1st interspace right foot(swab)     Testing in progress        11-22 @ 01:45 .Surgical Swab right hallux wound Enterococcus faecalis    Numerous Enterococcus faecalis  Moderate Alpha hemolytic strep "Susceptibilities not performed"  Few Staphylococcus aureus        11-22 @ 01:36 Clean Catch Clean Catch (Midstream)     No growth        11-21 @ 17:41 .Blood Blood-Peripheral     No growth at 48 hours        11-21 @ 17:40 .Blood Blood-Peripheral     No growth at 48 hours            WBC Count: 7.67 K/uL (11-24-21 @ 07:40)  WBC Count: 11.49 K/uL (11-23-21 @ 07:42)  WBC Count: 15.99 K/uL (11-21-21 @ 17:37)    Creatinine, Serum: 1.06 mg/dL (11-24-21 @ 07:40)  Creatinine, Serum: 1.16 mg/dL (11-23-21 @ 07:42)  Creatinine, Serum: 0.91 mg/dL (11-22-21 @ 00:45)  Creatinine, Serum: 0.98 mg/dL (11-21-21 @ 17:37)    C-Reactive Protein, Serum: 173 mg/L (11-21-21 @ 17:37)      Sedimentation Rate, Erythrocyte: 44 mm/hr (11-21-21 @ 17:37)

## 2021-11-24 NOTE — PROGRESS NOTE ADULT - ASSESSMENT
55y y/o M presents with right foot ulcer with suspected oseteomyelitis. Hx DM with neuropathy, s/p partial 5th amputation on R-foot and partial 1st toe amputation on L-foot, not on any DM medications, presented  with right foot ulcer for the past 2 months who was advised to go to hospital by outpatient podiatrist for worsening right foot ulcer.  Pt reports chronic right that began as a blister in that area and has not completely healed.  Pt reports several days of redness, swelling and sensation of tightness in right foot and ankle for the past several days for which he was started on clindamycin a few days ago on 11/18/21.  Pt reported minimal improvement of above symptoms on the antibiotic and states he developed a new blister on the inner side of his right great toe prompting his podiatrist to refer him to the ED.  - DM hx: DM type 2, a1c 12.9% on admission. He reports being diabetic for more than 10 years, admits to being non compliant, was on metformin for some time  which he self d/c’ed due to adverse GI effects, also was on lantus for some time then stopped, was not taking anything for last 1 yr. Also not checking sugars. He is very reluctant to starting insulin or any other injectable diabetic meds.      1. DM - A1C 12.(%  - Blood glucoses currently controlled  - Continue SSI  - Continue Lantus 15 units qhs  - Will continue to monitor    2. Diabetic foot infection  - Continue antibiotics   - Followed by podiatry  - Continue with pain control and wound care    3. Hyponatremia  - Improving  - Daily BMP  55y y/o M presents with right foot ulcer with suspected oseteomyelitis. Hx DM with neuropathy, s/p partial 5th amputation on R-foot and partial 1st toe amputation on L-foot, not on any DM medications, presented  with right foot ulcer for the past 2 months who was advised to go to hospital by outpatient podiatrist for worsening right foot ulcer.  Pt reports chronic right that began as a blister in that area and has not completely healed.  Pt reports several days of redness, swelling and sensation of tightness in right foot and ankle for the past several days for which he was started on clindamycin a few days ago on 11/18/21.  Pt reported minimal improvement of above symptoms on the antibiotic and states he developed a new blister on the inner side of his right great toe prompting his podiatrist to refer him to the ED.  - DM hx: DM type 2, a1c 12.9% on admission. He reports being diabetic for more than 10 years, admits to being non compliant, was on metformin for some time  which he self d/c’ed due to adverse GI effects, also was on lantus for some time then stopped, was not taking anything for last 1 yr. Also not checking sugars. He is very reluctant to starting insulin or any other injectable diabetic meds.      1. DM - A1C 12.(%  - Blood glucoses currently controlled  - Continue Lantus 15 units qhs, admelog 4 units tid with meals  - Continue SSI  - Will continue to monitor    2. Diabetic foot infection  - Continue antibiotics   - Followed by podiatry  - Continue with pain control and wound care    3. Hyponatremia  - Improving  - Daily BMP

## 2021-11-24 NOTE — PROGRESS NOTE ADULT - ASSESSMENT
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 He is admitted for diabetic foot infection, started on abc and podiatry team consulted. Pt is to go to OR for i&d    #Diabetic foot infection c/b osteomyelitis   -c/w  vancomycin, nicole, clinda   - ID team, podiatry team consult noted, most likely will need prolong course of IV Abx  --CT R-foot suggestive OM as well as intraoperative findings   -Pain control with Tylenol and IV Toradol PRN  -wound care as per podiatry team    Leukocytosis  -Due to above infection, trend CBC    Hyponatremia, has improved  - bmp daily for now and hyperglycemia control as bellow    Uncontrolled DM with hyperglycemia  - a1c 12  -c/w  Lantus 15 qhs and added AC 4 units, c/w MDSS, will adjust insulin base on POC  -  Endocrinologist consult noted  - diabetic teaching  will continue  to educate pt    DVT ppx - SCD for today, will start Lovenox in am  code - full  Dispo - will  OP iv abx, potential d/c on 11/26

## 2021-11-24 NOTE — PROGRESS NOTE ADULT - SUBJECTIVE AND OBJECTIVE BOX
CC: Follow up diabetes management     INTERVAL HPI/OVERNIGHT EVENTS:  Denies pain at this time    ROS: Patient denies chest pain, SOB, abd pain, N/V, or any other complaints. All remainder ROS negative.     MEDICATIONS  (STANDING):  clindamycin IVPB 900 milliGRAM(s) IV Intermittent every 8 hours  Dakins Solution - 1/4 Strength 1 Application(s) Topical once  dextrose 40% Gel 15 Gram(s) Oral once  dextrose 5%. 1000 milliLiter(s) (50 mL/Hr) IV Continuous <Continuous>  dextrose 5%. 1000 milliLiter(s) (100 mL/Hr) IV Continuous <Continuous>  dextrose 50% Injectable 25 Gram(s) IV Push once  dextrose 50% Injectable 12.5 Gram(s) IV Push once  dextrose 50% Injectable 25 Gram(s) IV Push once  glucagon  Injectable 1 milliGRAM(s) IntraMuscular once  influenza   Vaccine 0.5 milliLiter(s) IntraMuscular once  insulin glargine Injectable (LANTUS) 15 Unit(s) SubCutaneous at bedtime  insulin lispro (ADMELOG) corrective regimen sliding scale   SubCutaneous three times a day before meals  insulin lispro (ADMELOG) corrective regimen sliding scale   SubCutaneous at bedtime  insulin lispro Injectable (ADMELOG) 4 Unit(s) SubCutaneous three times a day before meals  lactated ringers. 1000 milliLiter(s) (75 mL/Hr) IV Continuous <Continuous>  lidocaine 1% (Preservative-free) Injectable 20 milliLiter(s) Local Injection once  meropenem  IVPB 1000 milliGRAM(s) IV Intermittent every 8 hours  sodium chloride 0.9%. 1000 milliLiter(s) (75 mL/Hr) IV Continuous <Continuous>  vancomycin  IVPB 1000 milliGRAM(s) IV Intermittent every 12 hours    MEDICATIONS  (PRN):  acetaminophen     Tablet .. 650 milliGRAM(s) Oral every 6 hours PRN Temp greater or equal to 38C (100.4F), Mild Pain (1 - 3), Moderate Pain (4 - 6)  fentaNYL    Injectable 25 MICROGram(s) IV Push every 5 minutes PRN Moderate Pain (4 - 6)  ondansetron Injectable 4 milliGRAM(s) IV Push once PRN Nausea and/or Vomiting      Allergies  penicillin (Anaphylaxis)  sulfa drugs (Other)    Intolerances  Keflex (Other)    Vital Signs Last 24 Hrs  T(C): 37.1 (24 Nov 2021 10:54), Max: 37.3 (24 Nov 2021 04:49)  T(F): 98.8 (24 Nov 2021 10:54), Max: 99.1 (24 Nov 2021 04:49)  HR: 67 (24 Nov 2021 10:54) (67 - 76)  BP: 104/67 (24 Nov 2021 10:54) (104/67 - 110/67)  BP(mean): --  RR: 17 (24 Nov 2021 10:54) (17 - 18)  SpO2: 94% (24 Nov 2021 10:54) (92% - 96%)    PHYSICAL EXAM:  General: No apparent distress  Neck: Supple, trachea midline, no thyromegaly  Respiratory: Lungs clear bilaterally, normal rate, effort  Cardiac: +S1, S2, no m/r/g  GI: +BS, soft, non tender, non distended  Extremities: No peripheral edema, ace wrap on right foot  Neuro: A+O X3, no tremor  Pysch: Normal mood, normal affect  Skin: No rashes, acanthosis       LABS:                        13.4   7.67  )-----------( 411      ( 24 Nov 2021 07:40 )             40.2     11-24    135  |  98  |  7.9<L>  ----------------------------<  151<H>  4.1   |  23.0  |  1.06    Ca    8.8      24 Nov 2021 07:40        CAPILLARY BLOOD GLUCOSE  POCT Blood Glucose.: 189 mg/dL (24 Nov 2021 11:43)  POCT Blood Glucose.: 130 mg/dL (24 Nov 2021 07:47)  POCT Blood Glucose.: 189 mg/dL (23 Nov 2021 20:54)  POCT Blood Glucose.: 166 mg/dL (23 Nov 2021 16:27)

## 2021-11-24 NOTE — PROGRESS NOTE ADULT - ASSESSMENT
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  Pt reports chronic right that began as a blister in that area and has not completely healed.  Pt reports several days of redness, swelling and sensation of tightness in right foot and ankle for the past several days for which he was started on clindamycin a few days ago on 11/18/21.  Pt reports minimal improvement of above symptoms on the antibiotic and states he developed a new blister on the inner side of his right great toe prompting his podiatrist to refer him to the ED.    Labs notable for leukocytosis, hyponatremia, hyperglycemia, elevated inflammatory markers.  CT RLE with phlegmon, soft tissue gas and suspicious for OM. Plan for OR today    Diabetic foot infection/OM/gas gangrene  Penicillin allergy  Uncontrolled DM    - BCX ngtd  - preop Wound cX e.faecalis, staph aureus and alpha hemolytic strep  - s/p OR 11/22 s/p partial amp of 1st ray  - OR cx and path pending  - Continue vancomycin adjusted for renal function, monitor trough per pharmacy protocol  - c/w meropenem and clindamycin  - d/w Dr Jain-low concern for residual OM however given uncontrolled DM, high risk for reinfection, proximal amputation, penicillin allergy, suggest PICC line and IV abx at least 2 weeks, may need longer depending on OR cx and path results  - PICC ordered  - Trend Fever  - Trend Leukocytosis  - optimize glycemic control    d/w attending, podiatry , CM  Will Follow

## 2021-11-24 NOTE — PROGRESS NOTE ADULT - SUBJECTIVE AND OBJECTIVE BOX
· Chief Complaint: The patient is a 55y Male complaining of wound check.  · HPI Objective Statement: 54 y/o male hx dm (no longer taking meds trying to control with diet), chronic right foot ulcer sent by Dr. Jian for iv abx and admission due to worsening foot ulcer. pt on oral clindamycin for several days, blister popped and having red streaking to foot. denies pain, no f/c, no other complaints. anaphylaxis to penicillin per pt.     	ROS: No fever/chills. No eye pain/changes in vision, No ear pain/sore throat/dysphagia, No chest pain/palpitations. No SOB/cough/. No abdominal pain, N/V/D, no black/bloody bm. No dysuria/frequency/discharge, No headache. No Dizziness.    No numbness/tingling/weakness.    Podiatry HPI: Patient is a 51M PMH DM, h/o gout, sent in by Dr. Jain for chronic osteomyelitis of right hallux with wound. Patient states that wound was getting worse and was seen in office on Thursday and received po abx. Patient states that today he noticed a new blister form on the inner side of his right big toe and decided to come to ED after calling Dr. Jain. Patient states he has no pain and no sensation to foot. He states that he has no other pedal complaints. Patient understands he may need amputation of the right big toe. Patient denies any fever, shares that on admission it was 98.7. Denies nausea, vomiting sob, chills, chest pain     Patient admits to  (-) Fevers, (-) Chills, (-) Nausea, (-) Vomiting, (-) Shortness of Breath (-) calf pain (-) chest pain     Podiatry interval HPI: Patient seen in bed, resting comfortably, s/p partial 1st ray resection right foot 11/22/21. Notes some mild, dull discomfort at his right foot with palpation. Denies any acute overnight events. Denies any changes in his symptoms.     Medications acetaminophen     Tablet .. 650 milliGRAM(s) Oral every 6 hours PRN  clindamycin IVPB 900 milliGRAM(s) IV Intermittent every 8 hours  Dakins Solution - 1/4 Strength 1 Application(s) Topical once  dextrose 40% Gel 15 Gram(s) Oral once  dextrose 5%. 1000 milliLiter(s) IV Continuous <Continuous>  dextrose 5%. 1000 milliLiter(s) IV Continuous <Continuous>  dextrose 50% Injectable 25 Gram(s) IV Push once  dextrose 50% Injectable 12.5 Gram(s) IV Push once  dextrose 50% Injectable 25 Gram(s) IV Push once  fentaNYL    Injectable 25 MICROGram(s) IV Push every 5 minutes PRN  glucagon  Injectable 1 milliGRAM(s) IntraMuscular once  influenza   Vaccine 0.5 milliLiter(s) IntraMuscular once  insulin glargine Injectable (LANTUS) 15 Unit(s) SubCutaneous at bedtime  insulin lispro (ADMELOG) corrective regimen sliding scale   SubCutaneous three times a day before meals  insulin lispro (ADMELOG) corrective regimen sliding scale   SubCutaneous at bedtime  insulin lispro Injectable (ADMELOG) 4 Unit(s) SubCutaneous three times a day before meals  lactated ringers. 1000 milliLiter(s) IV Continuous <Continuous>  lidocaine 1% (Preservative-free) Injectable 20 milliLiter(s) Local Injection once  meropenem  IVPB 1000 milliGRAM(s) IV Intermittent every 8 hours  ondansetron Injectable 4 milliGRAM(s) IV Push once PRN  sodium chloride 0.9%. 1000 milliLiter(s) IV Continuous <Continuous>    FHFHx: diabetes mellitus (Grandparent)    ,   PMHDiabetes mellitus    H/O diabetic neuropathy       PSHHistory of partial amputation of toe        Labs                          13.4   7.67  )-----------( 411      ( 24 Nov 2021 07:40 )             40.2      11-24    135  |  98  |  7.9<L>  ----------------------------<  151<H>  4.1   |  23.0  |  1.06    Ca    8.8      24 Nov 2021 07:40       Vital Signs Last 24 Hrs  T(C): 37.1 (24 Nov 2021 10:54), Max: 37.3 (24 Nov 2021 04:49)  T(F): 98.8 (24 Nov 2021 10:54), Max: 99.1 (24 Nov 2021 04:49)  HR: 67 (24 Nov 2021 10:54) (67 - 76)  BP: 104/67 (24 Nov 2021 10:54) (104/67 - 110/67)  BP(mean): --  RR: 17 (24 Nov 2021 10:54) (17 - 18)  SpO2: 94% (24 Nov 2021 10:54) (92% - 96%)  Sedimentation Rate, Erythrocyte: 44 mm/hr (11-21-21 @ 17:37)         C-Reactive Protein, Serum: 173 mg/L (11-21-21 @ 17:37)   WBC Count: 7.67 K/uL (11-24-21 @ 07:40)      Physical exam   Patient resting comfortably in bed. Patient is AAOx3, ambulation status: walking without limitations     Derm: s/p partial 1st ray resection, noted with sutures in place, intact, with small opening to the distal tip of remaining skin measuring about 1.0 x 1.0 cm, packing noted to be in place. No drainage noted. No malodor. No active purulent drainage. No fluctuance.    right submet 5 ulcer with serous drainage does not probe to bone. Granular wound base. erythema noted with ischemic changes to distal hallux    Vasc: DP Palpable, PT nonpalpable right, DP and PT palpable to left. edema noted to right hallux Skin temperature noted to be warm to warm from proximal to distal b/l.   Neuro: Protective and epicritic sensation grossly absent  Musc:  s/p partial 5th, partial 1st ray amp on right, sp partial 1st amp on left    xrays 11/21: official read pending, possible soft tissue emphysema noted to right foot first interspace. chronic OM changes to medial distal hallux         A:  right foot gas gangrene, s/p partial 1st ray resection 11/22/21      P:  Patient seen and evaluated   Chart reviewed   Patient WBC count noted to be downtrending (15 ->11 -> 7.7)  patient afebrile  Removed packing. Flushed open wound with saline, followed by dakins at bedside. Dried area thoroughly.  Verbal consent obtained for bedside closure of the wound.   2x 4-0 Nylon stitches were placed across the open site at the distal portion of the wound, achieving closure. Patient tolerated procedure well. All other sutures were noted to be intact.   Applied betadine, DSD to the area.   Intra op culture, pathology pending   Appreciate ID recommendations  Patient stable from podiatric standpoint  Discussed and seen bedside with attending Dr. Zendejas

## 2021-11-25 LAB
-  AMPICILLIN/SULBACTAM: SIGNIFICANT CHANGE UP
-  AMPICILLIN: SIGNIFICANT CHANGE UP
-  CEFAZOLIN: SIGNIFICANT CHANGE UP
-  CLINDAMYCIN: SIGNIFICANT CHANGE UP
-  ERYTHROMYCIN: SIGNIFICANT CHANGE UP
-  GENTAMICIN: SIGNIFICANT CHANGE UP
-  OXACILLIN: SIGNIFICANT CHANGE UP
-  PENICILLIN: SIGNIFICANT CHANGE UP
-  RIFAMPIN: SIGNIFICANT CHANGE UP
-  TETRACYCLINE: SIGNIFICANT CHANGE UP
-  TETRACYCLINE: SIGNIFICANT CHANGE UP
-  TRIMETHOPRIM/SULFAMETHOXAZOLE: SIGNIFICANT CHANGE UP
-  VANCOMYCIN: SIGNIFICANT CHANGE UP
-  VANCOMYCIN: SIGNIFICANT CHANGE UP
CREAT SERPL-MCNC: 1.03 MG/DL — SIGNIFICANT CHANGE UP (ref 0.5–1.3)
GLUCOSE BLDC GLUCOMTR-MCNC: 163 MG/DL — HIGH (ref 70–99)
GLUCOSE BLDC GLUCOMTR-MCNC: 190 MG/DL — HIGH (ref 70–99)
GLUCOSE BLDC GLUCOMTR-MCNC: 218 MG/DL — HIGH (ref 70–99)
GLUCOSE BLDC GLUCOMTR-MCNC: 253 MG/DL — HIGH (ref 70–99)
METHOD TYPE: SIGNIFICANT CHANGE UP
METHOD TYPE: SIGNIFICANT CHANGE UP

## 2021-11-25 PROCEDURE — 99232 SBSQ HOSP IP/OBS MODERATE 35: CPT

## 2021-11-25 PROCEDURE — 99233 SBSQ HOSP IP/OBS HIGH 50: CPT

## 2021-11-25 RX ORDER — ENOXAPARIN SODIUM 100 MG/ML
40 INJECTION SUBCUTANEOUS DAILY
Refills: 0 | Status: DISCONTINUED | OUTPATIENT
Start: 2021-11-25 | End: 2021-11-27

## 2021-11-25 RX ADMIN — Medication 4 UNIT(S): at 08:00

## 2021-11-25 RX ADMIN — Medication 4 UNIT(S): at 12:13

## 2021-11-25 RX ADMIN — ENOXAPARIN SODIUM 40 MILLIGRAM(S): 100 INJECTION SUBCUTANEOUS at 14:29

## 2021-11-25 RX ADMIN — MEROPENEM 100 MILLIGRAM(S): 1 INJECTION INTRAVENOUS at 05:05

## 2021-11-25 RX ADMIN — Medication 4: at 08:41

## 2021-11-25 RX ADMIN — Medication 100 MILLIGRAM(S): at 05:04

## 2021-11-25 RX ADMIN — Medication 6: at 12:12

## 2021-11-25 RX ADMIN — Medication 166.67 MILLIGRAM(S): at 00:45

## 2021-11-25 RX ADMIN — MEROPENEM 100 MILLIGRAM(S): 1 INJECTION INTRAVENOUS at 21:56

## 2021-11-25 RX ADMIN — Medication 166.67 MILLIGRAM(S): at 11:38

## 2021-11-25 RX ADMIN — INSULIN GLARGINE 15 UNIT(S): 100 INJECTION, SOLUTION SUBCUTANEOUS at 22:48

## 2021-11-25 RX ADMIN — MEROPENEM 100 MILLIGRAM(S): 1 INJECTION INTRAVENOUS at 14:29

## 2021-11-25 RX ADMIN — Medication 2: at 16:41

## 2021-11-25 RX ADMIN — Medication 4 UNIT(S): at 16:41

## 2021-11-25 RX ADMIN — Medication 166.67 MILLIGRAM(S): at 23:16

## 2021-11-25 NOTE — PROGRESS NOTE ADULT - SUBJECTIVE AND OBJECTIVE BOX
[facility]  INFECTIOUS DISEASES AND INTERNAL MEDICINE at [facility]  =======================================================  Siva Reynolds MD  Diplomates American Board of Internal Medicine and Infectious Diseases  Tel: [phone]      Fax: [phone]  =======================================================    ZEINAB SCHNEIDER [id number]    Follow up: OM  feels well  pain controlled      Allergies:  Keflex (Other)  penicillin (Anaphylaxis)  sulfa drugs (Other)           REVIEW OF SYSTEMS:  CONSTITUTIONAL:  No Fever or chills  HEENT:   No diplopia or blurred vision.  No earache, sore throat or runny nose.  CARDIOVASCULAR:  No pressure, squeezing, strangling, tightness, heaviness or aching about the chest, neck, axilla or epigastrium.  RESPIRATORY:  No cough, shortness of breath  GASTROINTESTINAL:  No nausea, vomiting or diarrhea.  GENITOURINARY:  No dysuria, frequency or urgency. No Blood in urine  MUSCULOSKELETAL:  no joint aches, no muscle pain  SKIN:  No change in skin, hair or nails.  NEUROLOGIC:  No Headaches, seizures or weakness.  PSYCHIATRIC:  No disorder of thought or mood.  ENDOCRINE:  No heat or cold intolerance  HEMATOLOGICAL:  No easy bruising or bleeding.       Physical Exam:  GEN: NAD, pleasant  HEENT: normocephalic and atraumatic. EOMI. PERRL.  Anicteric   NECK: Supple.   LUNGS: Clear to auscultation.  HEART: Regular rate and rhythm without murmur.  ABDOMEN: Soft, nontender, and nondistended.  Positive bowel sounds.    : No CVA tenderness  EXTREMITIES: Without any edema. images of rt foot seen, stump clean no erythema mild dehiscensce  MSK: no joint swelling  NEUROLOGIC: Cranial nerves II through XII are grossly intact. No focal deficits  PSYCHIATRIC: Appropriate affect .  SKIN: No Rash      Vitals:    T(F): 98.2 (25 Nov 2021 09:48), Max: 98.7 (24 Nov 2021 23:00)  HR: 65 (25 Nov 2021 09:48)  BP: 105/67 (25 Nov 2021 09:48)  RR: 18 (25 Nov 2021 09:48)  SpO2: 95% (25 Nov 2021 09:48) (93% - 96%)  temp max in last 48H T(F): , Max: 99.1 (11-24-21 @ 04:49)    Current Antibiotics:  meropenem  IVPB 1000 milliGRAM(s) IV Intermittent every 8 hours  vancomycin  IVPB 1250 milliGRAM(s) IV Intermittent every 12 hours    Other medications:  Dakins Solution - 1/4 Strength 1 Application(s) Topical once  dextrose 40% Gel 15 Gram(s) Oral once  dextrose 5%. 1000 milliLiter(s) IV Continuous <Continuous>  dextrose 5%. 1000 milliLiter(s) IV Continuous <Continuous>  dextrose 50% Injectable 25 Gram(s) IV Push once  dextrose 50% Injectable 12.5 Gram(s) IV Push once  dextrose 50% Injectable 25 Gram(s) IV Push once  enoxaparin Injectable 40 milliGRAM(s) SubCutaneous daily  glucagon  Injectable 1 milliGRAM(s) IntraMuscular once  influenza   Vaccine 0.5 milliLiter(s) IntraMuscular once  insulin glargine Injectable (LANTUS) 15 Unit(s) SubCutaneous at bedtime  insulin lispro (ADMELOG) corrective regimen sliding scale   SubCutaneous three times a day before meals  insulin lispro (ADMELOG) corrective regimen sliding scale   SubCutaneous at bedtime  insulin lispro Injectable (ADMELOG) 4 Unit(s) SubCutaneous three times a day before meals  lactated ringers. 1000 milliLiter(s) IV Continuous <Continuous>  lidocaine 1% (Preservative-free) Injectable 20 milliLiter(s) Local Injection once  sodium chloride 0.9%. 1000 milliLiter(s) IV Continuous <Continuous>                            13.4   7.67  )-----------( 411      ( 24 Nov 2021 07:40 )             40.2     11-25    x   |  x   |  x   ----------------------------<  x   x    |  x   |  1.03    Ca    8.8      24 Nov 2021 07:40      RECENT CULTURES:  11-23 @ 22:30 .Surgical Swab Right Great Toe     Numerous Enterococcus faecalis        11-23 @ 22:24 .Surgical Swab first interspace right foot swab     Numerous Enterococcus faecalis        11-22 @ 01:45 .Surgical Swab right hallux wound Enterococcus faecalis  Staphylococcus aureus    Numerous Enterococcus faecalis  Moderate Alpha hemolytic strep "Susceptibilities not performed"  Few Staphylococcus aureus        11-22 @ 01:36 Clean Catch Clean Catch (Midstream)     No growth        11-21 @ 17:41 .Blood Blood-Peripheral     No growth at 48 hours        11-21 @ 17:40 .Blood Blood-Peripheral     No growth at 48 hours            WBC Count: 7.67 K/uL (11-24-21 @ 07:40)  WBC Count: 11.49 K/uL (11-23-21 @ 07:42)  WBC Count: 15.99 K/uL (11-21-21 @ 17:37)    Creatinine, Serum: 1.03 mg/dL (11-25-21 @ 13:41)  Creatinine, Serum: 1.06 mg/dL (11-24-21 @ 07:40)  Creatinine, Serum: 1.16 mg/dL (11-23-21 @ 07:42)  Creatinine, Serum: 0.91 mg/dL (11-22-21 @ 00:45)  Creatinine, Serum: 0.98 mg/dL (11-21-21 @ 17:37)    C-Reactive Protein, Serum: 173 mg/L (11-21-21 @ 17:37)      Sedimentation Rate, Erythrocyte: 44 mm/hr (11-21-21 @ 17:37)

## 2021-11-25 NOTE — PROGRESS NOTE ADULT - SUBJECTIVE AND OBJECTIVE BOX
[facility] Division of Hospital Medicine - late entry note    Chief Complaint:  diabetic foot infection with om    SUBJECTIVE: reports no med complains    OVERNIGHT EVENTS: none reported     Patient denies chest pain, SOB, abd pain, N/V, fever, chills, dysuria or any other complaints. All remainder ROS negative.     MEDICATIONS  (STANDING):  Dakins Solution - 1/4 Strength 1 Application(s) Topical once  dextrose 40% Gel 15 Gram(s) Oral once  dextrose 5%. 1000 milliLiter(s) (50 mL/Hr) IV Continuous <Continuous>  dextrose 5%. 1000 milliLiter(s) (100 mL/Hr) IV Continuous <Continuous>  dextrose 50% Injectable 25 Gram(s) IV Push once  dextrose 50% Injectable 12.5 Gram(s) IV Push once  dextrose 50% Injectable 25 Gram(s) IV Push once  glucagon  Injectable 1 milliGRAM(s) IntraMuscular once  influenza   Vaccine 0.5 milliLiter(s) IntraMuscular once  insulin glargine Injectable (LANTUS) 15 Unit(s) SubCutaneous at bedtime  insulin lispro (ADMELOG) corrective regimen sliding scale   SubCutaneous three times a day before meals  insulin lispro (ADMELOG) corrective regimen sliding scale   SubCutaneous at bedtime  insulin lispro Injectable (ADMELOG) 4 Unit(s) SubCutaneous three times a day before meals  lactated ringers. 1000 milliLiter(s) (75 mL/Hr) IV Continuous <Continuous>  lidocaine 1% (Preservative-free) Injectable 20 milliLiter(s) Local Injection once  meropenem  IVPB 1000 milliGRAM(s) IV Intermittent every 8 hours  sodium chloride 0.9%. 1000 milliLiter(s) (75 mL/Hr) IV Continuous <Continuous>  vancomycin  IVPB 1250 milliGRAM(s) IV Intermittent every 12 hours    MEDICATIONS  (PRN):  acetaminophen     Tablet .. 650 milliGRAM(s) Oral every 6 hours PRN Temp greater or equal to 38C (100.4F), Mild Pain (1 - 3), Moderate Pain (4 - 6)  fentaNYL    Injectable 25 MICROGram(s) IV Push every 5 minutes PRN Moderate Pain (4 - 6)  ondansetron Injectable 4 milliGRAM(s) IV Push once PRN Nausea and/or Vomiting        I&O's Summary      PHYSICAL EXAM:  Vital Signs Last 24 Hrs  T(C): 36.8 (25 Nov 2021 09:48), Max: 37.1 (24 Nov 2021 10:54)  T(F): 98.2 (25 Nov 2021 09:48), Max: 98.8 (24 Nov 2021 10:54)  HR: 65 (25 Nov 2021 09:48) (62 - 70)  BP: 105/67 (25 Nov 2021 09:48) (104/67 - 115/77)  BP(mean): --  RR: 18 (25 Nov 2021 09:48) (16 - 18)  SpO2: 95% (25 Nov 2021 09:48) (93% - 96%)        CONSTITUTIONAL: NAD, well-developed, well-groomed  ENMT: Moist oral mucosa, no pharyngeal injection or exudates; normal dentition; No JVD  RESPIRATORY: Normal respiratory effort; lungs are clear to auscultation bilaterally  CARDIOVASCULAR: Regular rate and rhythm, normal S1 and S2, no murmur/rub/gallop; No lower extremity edema; Peripheral pulses are 2+ bilaterally  ABDOMEN: Nontender to palpation, normoactive bowel sounds, no rebound/guarding; No hepatosplenomegaly  MUSCLOSKELETAL:  no clubbing or cyanosis of digits; no joint swelling or tenderness to palpation  PSYCH: A+O to person, place, and time; affect appropriate  NEUROLOGY: CN 2-12 are intact and symmetric; no gross sensory deficits; was observed moving all 4 ext against gravity cooperating with exam.   SKIN:  R foot dressed by podiatry team;      LABS:                        13.4   7.67  )-----------( 411      ( 24 Nov 2021 07:40 )             40.2     11-24    135  |  98  |  7.9<L>  ----------------------------<  151<H>  4.1   |  23.0  |  1.06    Ca    8.8      24 Nov 2021 07:40                Culture - Acid Fast - Other w/Smear (collected 23 Nov 2021 22:30)  Source: .Other Right Great Toe    Culture - Fungal, Other (collected 23 Nov 2021 22:24)  Source: .Other abcess 1st interspace right toe (swab)  Preliminary Report (24 Nov 2021 06:41):    Testing in progress    Culture - Acid Fast - Other w/Smear (collected 23 Nov 2021 22:24)  Source: .Other abcess 1st interspace right foot(swab)    Culture - Surgical Swab (collected 23 Nov 2021 22:24)  Source: .Surgical Swab first interspace right foot swab  Preliminary Report (24 Nov 2021 22:08):    Numerous Enterococcus faecalis      CAPILLARY BLOOD GLUCOSE      POCT Blood Glucose.: 218 mg/dL (25 Nov 2021 07:48)  POCT Blood Glucose.: 204 mg/dL (24 Nov 2021 21:11)  POCT Blood Glucose.: 143 mg/dL (24 Nov 2021 16:20)  POCT Blood Glucose.: 189 mg/dL (24 Nov 2021 11:43)        RADIOLOGY & ADDITIONAL TESTS:  Results Reviewed:   Imaging Personally Reviewed:  Electrocardiogram Personally Reviewed:

## 2021-11-25 NOTE — PROGRESS NOTE ADULT - ASSESSMENT
55y y/o M presents with right foot ulcer with suspected oseteomyelitis. Hx DM with neuropathy, s/p partial 5th amputation on R-foot and partial 1st toe amputation on L-foot, not on any DM medications, presented  with right foot ulcer for the past 2 months who was advised to go to hospital by outpatient podiatrist for worsening right foot ulcer.  Pt reports chronic right that began as a blister in that area and has not completely healed.  Pt reports several days of redness, swelling and sensation of tightness in right foot and ankle for the past several days for which he was started on clindamycin a few days ago on 11/18/21.  Pt reported minimal improvement of above symptoms on the antibiotic and states he developed a new blister on the inner side of his right great toe prompting his podiatrist to refer him to the ED.  - DM hx: DM type 2, a1c 12.9% on admission. He reports being diabetic for more than 10 years, admits to being non compliant, was on metformin for some time  which he self d/c’ed due to adverse GI effects, also was on lantus for some time then stopped, was not taking anything for last 1 yr. Also not checking sugars. He is very reluctant to starting insulin or any other injectable diabetic meds.      1. DM - A1C 12.%, sugars mildly high  - To go up on Lantus to 18 units qhs, admelog 6 units tid with meals  - Continue SSI  - Will continue to monitor    2. Diabetic foot infection  - Continue antibiotics   - Followed by podiatry  - Continue with pain control and wound care    3. Hyponatremia  - Improving  - Daily BMP

## 2021-11-25 NOTE — PROGRESS NOTE ADULT - ASSESSMENT
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 He is admitted for diabetic foot infection, started on abc and podiatry team consulted. On 11/23 he went to OR where deep wound culture was obtained with 1st interspace abscess culture, debrided soft tissue, amputation of 1st digit and 1st met head. ID team was consulted to guide Abx choice as well as Endocrinology team to assist with uncontrolled DM management.     #Diabetic foot infection c/b osteomyelitis of 1st   -c/w  vancomycin, nicole, d/c clindamycin  - pending in-operative cx sensitivity   - ID team, podiatry team consult noted, most likely will need prolong course of IV Abx  --CT R-foot suggestive OM as well as intraoperative findings   - Pain control with Tylenol and IV Toradol PRN  -wound care as per podiatry team    Leukocytosis  -Due to above infection, trend CBC    Hyponatremia, has improved  - bmp daily for now and hyperglycemia control as bellow    Uncontrolled DM with hyperglycemia  - a1c 12  -c/w  Lantus 15 qhs and added AC 4 units, c/w MDSS, will adjust insulin base on POC  -  Endocrinologist consult noted  - diabetic teaching  will continue  to educate pt    DVT ppx - SCD for today, will start Lovenox in am  code - full  Dispo - will  OP iv abx, potential d/c on 11/26

## 2021-11-25 NOTE — PROGRESS NOTE ADULT - SUBJECTIVE AND OBJECTIVE BOX
CC: Follow up diabetes management     INTERVAL HPI/OVERNIGHT EVENTS:  No complaints at this point    ROS: Patient denies chest pain, SOB, abd pain, N/V, or any other complaints. All remainder ROS negative.     MEDICATIONS  (STANDING):  clindamycin IVPB 900 milliGRAM(s) IV Intermittent every 8 hours  Dakins Solution - 1/4 Strength 1 Application(s) Topical once  dextrose 40% Gel 15 Gram(s) Oral once  dextrose 5%. 1000 milliLiter(s) (50 mL/Hr) IV Continuous <Continuous>  dextrose 5%. 1000 milliLiter(s) (100 mL/Hr) IV Continuous <Continuous>  dextrose 50% Injectable 25 Gram(s) IV Push once  dextrose 50% Injectable 12.5 Gram(s) IV Push once  dextrose 50% Injectable 25 Gram(s) IV Push once  glucagon  Injectable 1 milliGRAM(s) IntraMuscular once  influenza   Vaccine 0.5 milliLiter(s) IntraMuscular once  insulin glargine Injectable (LANTUS) 15 Unit(s) SubCutaneous at bedtime  insulin lispro (ADMELOG) corrective regimen sliding scale   SubCutaneous three times a day before meals  insulin lispro (ADMELOG) corrective regimen sliding scale   SubCutaneous at bedtime  insulin lispro Injectable (ADMELOG) 4 Unit(s) SubCutaneous three times a day before meals  lactated ringers. 1000 milliLiter(s) (75 mL/Hr) IV Continuous <Continuous>  lidocaine 1% (Preservative-free) Injectable 20 milliLiter(s) Local Injection once  meropenem  IVPB 1000 milliGRAM(s) IV Intermittent every 8 hours  sodium chloride 0.9%. 1000 milliLiter(s) (75 mL/Hr) IV Continuous <Continuous>  vancomycin  IVPB 1000 milliGRAM(s) IV Intermittent every 12 hours    MEDICATIONS  (PRN):  acetaminophen     Tablet .. 650 milliGRAM(s) Oral every 6 hours PRN Temp greater or equal to 38C (100.4F), Mild Pain (1 - 3), Moderate Pain (4 - 6)  fentaNYL    Injectable 25 MICROGram(s) IV Push every 5 minutes PRN Moderate Pain (4 - 6)  ondansetron Injectable 4 milliGRAM(s) IV Push once PRN Nausea and/or Vomiting      Allergies  penicillin (Anaphylaxis)  sulfa drugs (Other)    Intolerances  Keflex (Other)    Vital Signs Last 24 Hrs  T(C): 37.1 (24 Nov 2021 10:54), Max: 37.3 (24 Nov 2021 04:49)  T(F): 98.8 (24 Nov 2021 10:54), Max: 99.1 (24 Nov 2021 04:49)  HR: 67 (24 Nov 2021 10:54) (67 - 76)  BP: 104/67 (24 Nov 2021 10:54) (104/67 - 110/67)  BP(mean): --  RR: 17 (24 Nov 2021 10:54) (17 - 18)  SpO2: 94% (24 Nov 2021 10:54) (92% - 96%)    PHYSICAL EXAM:  General: No apparent distress  Neck: Supple, trachea midline, no thyromegaly  Respiratory: Lungs clear bilaterally, normal rate, effort  Cardiac: +S1, S2, no m/r/g  GI: +BS, soft, non tender, non distended  Pysch: Normal mood, normal affect  Skin: No rashes, acanthosis       LABS:                        13.4   7.67  )-----------( 411      ( 24 Nov 2021 07:40 )             40.2     11-24    135  |  98  |  7.9<L>  ----------------------------<  151<H>  4.1   |  23.0  |  1.06    Ca    8.8      24 Nov 2021 07:40        CAPILLARY BLOOD GLUCOSE  POCT Blood Glucose.: 189 mg/dL (24 Nov 2021 11:43)  POCT Blood Glucose.: 130 mg/dL (24 Nov 2021 07:47)  POCT Blood Glucose.: 189 mg/dL (23 Nov 2021 20:54)  POCT Blood Glucose.: 166 mg/dL (23 Nov 2021 16:27)     CC: Follow up diabetes management     INTERVAL HPI/OVERNIGHT EVENTS:  No complaints at this point    ROS: Patient denies chest pain, SOB, abd pain, N/V, or any other complaints. All remainder ROS negative.     MEDICATIONS  (STANDING):  clindamycin IVPB 900 milliGRAM(s) IV Intermittent every 8 hours  Dakins Solution - 1/4 Strength 1 Application(s) Topical once  dextrose 40% Gel 15 Gram(s) Oral once  dextrose 5%. 1000 milliLiter(s) (50 mL/Hr) IV Continuous <Continuous>  dextrose 5%. 1000 milliLiter(s) (100 mL/Hr) IV Continuous <Continuous>  dextrose 50% Injectable 25 Gram(s) IV Push once  dextrose 50% Injectable 12.5 Gram(s) IV Push once  dextrose 50% Injectable 25 Gram(s) IV Push once  glucagon  Injectable 1 milliGRAM(s) IntraMuscular once  influenza   Vaccine 0.5 milliLiter(s) IntraMuscular once  insulin glargine Injectable (LANTUS) 15 Unit(s) SubCutaneous at bedtime  insulin lispro (ADMELOG) corrective regimen sliding scale   SubCutaneous three times a day before meals  insulin lispro (ADMELOG) corrective regimen sliding scale   SubCutaneous at bedtime  insulin lispro Injectable (ADMELOG) 4 Unit(s) SubCutaneous three times a day before meals  lactated ringers. 1000 milliLiter(s) (75 mL/Hr) IV Continuous <Continuous>  lidocaine 1% (Preservative-free) Injectable 20 milliLiter(s) Local Injection once  meropenem  IVPB 1000 milliGRAM(s) IV Intermittent every 8 hours  sodium chloride 0.9%. 1000 milliLiter(s) (75 mL/Hr) IV Continuous <Continuous>  vancomycin  IVPB 1000 milliGRAM(s) IV Intermittent every 12 hours    MEDICATIONS  (PRN):  acetaminophen     Tablet .. 650 milliGRAM(s) Oral every 6 hours PRN Temp greater or equal to 38C (100.4F), Mild Pain (1 - 3), Moderate Pain (4 - 6)  fentaNYL    Injectable 25 MICROGram(s) IV Push every 5 minutes PRN Moderate Pain (4 - 6)  ondansetron Injectable 4 milliGRAM(s) IV Push once PRN Nausea and/or Vomiting      Allergies  penicillin (Anaphylaxis)  sulfa drugs (Other)    Intolerances  Keflex (Other)    Vital Signs Last 24 Hrs  T(C): 37.1 (24 Nov 2021 10:54), Max: 37.3 (24 Nov 2021 04:49)  T(F): 98.8 (24 Nov 2021 10:54), Max: 99.1 (24 Nov 2021 04:49)  HR: 67 (24 Nov 2021 10:54) (67 - 76)  BP: 104/67 (24 Nov 2021 10:54) (104/67 - 110/67)  BP(mean): --  RR: 17 (24 Nov 2021 10:54) (17 - 18)  SpO2: 94% (24 Nov 2021 10:54) (92% - 96%)    PHYSICAL EXAM:  General: No apparent distress  Respiratory: Lungs clear bilaterally, normal rate, effort  Cardiac: +S1, S2, no m/r/g  GI: +BS, soft, non tender, non distended  Pysch: Normal mood, normal affect      LABS:                        13.4   7.67  )-----------( 411      ( 24 Nov 2021 07:40 )             40.2     11-24    135  |  98  |  7.9<L>  ----------------------------<  151<H>  4.1   |  23.0  |  1.06    Ca    8.8      24 Nov 2021 07:40        CAPILLARY BLOOD GLUCOSE  POCT Blood Glucose.: 189 mg/dL (24 Nov 2021 11:43)  POCT Blood Glucose.: 130 mg/dL (24 Nov 2021 07:47)  POCT Blood Glucose.: 189 mg/dL (23 Nov 2021 20:54)  POCT Blood Glucose.: 166 mg/dL (23 Nov 2021 16:27)

## 2021-11-25 NOTE — PROGRESS NOTE ADULT - SUBJECTIVE AND OBJECTIVE BOX
· Chief Complaint: The patient is a 55y Male complaining of wound check.  · HPI Objective Statement: 54 y/o male hx dm (no longer taking meds trying to control with diet), chronic right foot ulcer sent by Dr. Jain for iv abx and admission due to worsening foot ulcer. pt on oral clindamycin for several days, blister popped and having red streaking to foot. denies pain, no f/c, no other complaints. anaphylaxis to penicillin per pt.     	ROS: No fever/chills. No eye pain/changes in vision, No ear pain/sore throat/dysphagia, No chest pain/palpitations. No SOB/cough/. No abdominal pain, N/V/D, no black/bloody bm. No dysuria/frequency/discharge, No headache. No Dizziness.    No numbness/tingling/weakness.    Podiatry HPI: Patient is a 51M PMH DM, h/o gout, sent in by Dr. Jain for chronic osteomyelitis of right hallux with wound. Patient states that wound was getting worse and was seen in office on Thursday and received po abx. Patient states that today he noticed a new blister form on the inner side of his right big toe and decided to come to ED after calling Dr. Jain. Patient states he has no pain and no sensation to foot. He states that he has no other pedal complaints. Patient understands he may need amputation of the right big toe. Patient denies any fever, shares that on admission it was 98.7. Denies nausea, vomiting sob, chills, chest pain     Patient admits to  (-) Fevers, (-) Chills, (-) Nausea, (-) Vomiting, (-) Shortness of Breath (-) calf pain (-) chest pain     Podiatry interval HPI: Patient seen in bed, resting comfortably, s/p partial 1st ray resection right foot 11/22/21. Notes some mild, dull discomfort at his right foot with palpation. Denies any acute overnight events. Denies any changes in his symptoms.   Medications acetaminophen     Tablet .. 650 milliGRAM(s) Oral every 6 hours PRN  Dakins Solution - 1/4 Strength 1 Application(s) Topical once  dextrose 40% Gel 15 Gram(s) Oral once  dextrose 5%. 1000 milliLiter(s) IV Continuous <Continuous>  dextrose 5%. 1000 milliLiter(s) IV Continuous <Continuous>  dextrose 50% Injectable 25 Gram(s) IV Push once  dextrose 50% Injectable 12.5 Gram(s) IV Push once  dextrose 50% Injectable 25 Gram(s) IV Push once  enoxaparin Injectable 40 milliGRAM(s) SubCutaneous daily  fentaNYL    Injectable 25 MICROGram(s) IV Push every 5 minutes PRN  glucagon  Injectable 1 milliGRAM(s) IntraMuscular once  influenza   Vaccine 0.5 milliLiter(s) IntraMuscular once  insulin glargine Injectable (LANTUS) 15 Unit(s) SubCutaneous at bedtime  insulin lispro (ADMELOG) corrective regimen sliding scale   SubCutaneous three times a day before meals  insulin lispro (ADMELOG) corrective regimen sliding scale   SubCutaneous at bedtime  insulin lispro Injectable (ADMELOG) 4 Unit(s) SubCutaneous three times a day before meals  lactated ringers. 1000 milliLiter(s) IV Continuous <Continuous>  lidocaine 1% (Preservative-free) Injectable 20 milliLiter(s) Local Injection once  meropenem  IVPB 1000 milliGRAM(s) IV Intermittent every 8 hours  ondansetron Injectable 4 milliGRAM(s) IV Push once PRN  sodium chloride 0.9%. 1000 milliLiter(s) IV Continuous <Continuous>  vancomycin  IVPB 1250 milliGRAM(s) IV Intermittent every 12 hours    FHFHx: diabetes mellitus (Grandparent)    ,   PMHDiabetes mellitus    H/O diabetic neuropathy       PSHHistory of partial amputation of toe        Labs                          13.4   7.67  )-----------( 411      ( 24 Nov 2021 07:40 )             40.2      11-25    x   |  x   |  x   ----------------------------<  x   x    |  x   |  1.03    Ca    8.8      24 Nov 2021 07:40       Vital Signs Last 24 Hrs  T(C): 36.9 (25 Nov 2021 16:45), Max: 37.1 (24 Nov 2021 23:00)  T(F): 98.5 (25 Nov 2021 16:45), Max: 98.7 (24 Nov 2021 23:00)  HR: 67 (25 Nov 2021 16:45) (62 - 67)  BP: 111/73 (25 Nov 2021 16:45) (105/67 - 115/77)  BP(mean): --  RR: 18 (25 Nov 2021 16:45) (16 - 18)  SpO2: 98% (25 Nov 2021 16:45) (93% - 98%)  Sedimentation Rate, Erythrocyte: 44 mm/hr (11-21-21 @ 17:37)         C-Reactive Protein, Serum: 173 mg/L (11-21-21 @ 17:37)       Physical exam   Patient resting comfortably in bed. Patient is AAOx3, ambulation status: walking without limitations     Derm: s/p partial 1st ray resection, noted with sutures in place, intact, with small opening to the distal tip of remaining skin measuring about 1.0 x 1.0 cm, packing noted to be in place. No drainage noted. No malodor. No active purulent drainage. No fluctuance. Mild maceration    right submet 5 ulcer with serous drainage does not probe to bone. Granular wound base. erythema noted with ischemic changes to distal hallux    Vasc: DP Palpable, PT nonpalpable right, DP and PT palpable to left. edema noted to right hallux Skin temperature noted to be warm to cool  from proximal to distal b/l.   Neuro: Protective and epicritic sensation grossly absent  Musc:  s/p partial 5th, partial 1st ray amp on right, sp partial 1st amp on left  Derm: sutures intact, skin well coapted, no discharge, no active bleeding    xrays 11/21: official read pending, possible soft tissue emphysema noted to right foot first interspace. 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   Intra op culture, pathology pending   Appreciate ID recommendations  Patient is stable  for discharge from podiatric standpoint  Patient needs to follow up with Dr. Jain upon discharge  Discussed and seen bedside with attending Dr. Jain

## 2021-11-25 NOTE — PROGRESS NOTE ADULT - ASSESSMENT
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  Pt reports chronic right that began as a blister in that area and has not completely healed.  Pt reports several days of redness, swelling and sensation of tightness in right foot and ankle for the past several days for which he was started on clindamycin a few days ago on 11/18/21.  Pt reports minimal improvement of above symptoms on the antibiotic and states he developed a new blister on the inner side of his right great toe prompting his podiatrist to refer him to the ED.    Labs notable for leukocytosis, hyponatremia, hyperglycemia, elevated inflammatory markers.  CT RLE with phlegmon, soft tissue gas and suspicious for OM. Plan for OR today    Diabetic foot infection/OM/gas gangrene  Penicillin allergy  Uncontrolled DM    - BCX ngtd  - preop Wound cX e.faecalis, staph aureus and alpha hemolytic strep  - s/p OR 11/22 s/p partial amp of 1st ray  - OR cx +enterococcus  -  and path pending  - Continue vancomycin adjusted for renal function, monitor trough per pharmacy protocol  - c/w meropenem and clindamycin  - d/w Dr Jain-low concern for residual OM however given uncontrolled DM, high risk for reinfection, proximal amputation, penicillin allergy, suggest PICC line and IV abx   - PICC ordered  - Trend Fever  - Trend Leukocytosis  - optimize glycemic control    Will Follow

## 2021-11-26 ENCOUNTER — TRANSCRIPTION ENCOUNTER (OUTPATIENT)
Age: 56
End: 2021-11-26

## 2021-11-26 LAB
-  AMPICILLIN: SIGNIFICANT CHANGE UP
-  TETRACYCLINE: SIGNIFICANT CHANGE UP
-  VANCOMYCIN: SIGNIFICANT CHANGE UP
ANION GAP SERPL CALC-SCNC: 14 MMOL/L — SIGNIFICANT CHANGE UP (ref 5–17)
BUN SERPL-MCNC: 10.4 MG/DL — SIGNIFICANT CHANGE UP (ref 8–20)
CALCIUM SERPL-MCNC: 8.7 MG/DL — SIGNIFICANT CHANGE UP (ref 8.6–10.2)
CHLORIDE SERPL-SCNC: 96 MMOL/L — LOW (ref 98–107)
CO2 SERPL-SCNC: 23 MMOL/L — SIGNIFICANT CHANGE UP (ref 22–29)
CREAT SERPL-MCNC: 0.99 MG/DL — SIGNIFICANT CHANGE UP (ref 0.5–1.3)
CULTURE RESULTS: SIGNIFICANT CHANGE UP
GLUCOSE BLDC GLUCOMTR-MCNC: 128 MG/DL — HIGH (ref 70–99)
GLUCOSE BLDC GLUCOMTR-MCNC: 148 MG/DL — HIGH (ref 70–99)
GLUCOSE BLDC GLUCOMTR-MCNC: 162 MG/DL — HIGH (ref 70–99)
GLUCOSE BLDC GLUCOMTR-MCNC: 232 MG/DL — HIGH (ref 70–99)
GLUCOSE SERPL-MCNC: 247 MG/DL — HIGH (ref 70–99)
HCT VFR BLD CALC: 40.3 % — SIGNIFICANT CHANGE UP (ref 39–50)
HGB BLD-MCNC: 13.7 G/DL — SIGNIFICANT CHANGE UP (ref 13–17)
MCHC RBC-ENTMCNC: 29.5 PG — SIGNIFICANT CHANGE UP (ref 27–34)
MCHC RBC-ENTMCNC: 34 GM/DL — SIGNIFICANT CHANGE UP (ref 32–36)
MCV RBC AUTO: 86.7 FL — SIGNIFICANT CHANGE UP (ref 80–100)
METHOD TYPE: SIGNIFICANT CHANGE UP
ORGANISM # SPEC MICROSCOPIC CNT: SIGNIFICANT CHANGE UP
PLATELET # BLD AUTO: 413 K/UL — HIGH (ref 150–400)
POTASSIUM SERPL-MCNC: 4.5 MMOL/L — SIGNIFICANT CHANGE UP (ref 3.5–5.3)
POTASSIUM SERPL-SCNC: 4.5 MMOL/L — SIGNIFICANT CHANGE UP (ref 3.5–5.3)
RBC # BLD: 4.65 M/UL — SIGNIFICANT CHANGE UP (ref 4.2–5.8)
RBC # FLD: 11.5 % — SIGNIFICANT CHANGE UP (ref 10.3–14.5)
SODIUM SERPL-SCNC: 133 MMOL/L — LOW (ref 135–145)
SPECIMEN SOURCE: SIGNIFICANT CHANGE UP
VANCOMYCIN TROUGH SERPL-MCNC: 12.9 UG/ML — SIGNIFICANT CHANGE UP (ref 10–20)
WBC # BLD: 7.3 K/UL — SIGNIFICANT CHANGE UP (ref 3.8–10.5)
WBC # FLD AUTO: 7.3 K/UL — SIGNIFICANT CHANGE UP (ref 3.8–10.5)

## 2021-11-26 PROCEDURE — 36573 INSJ PICC RS&I 5 YR+: CPT

## 2021-11-26 PROCEDURE — 99233 SBSQ HOSP IP/OBS HIGH 50: CPT

## 2021-11-26 PROCEDURE — 71045 X-RAY EXAM CHEST 1 VIEW: CPT | Mod: 26

## 2021-11-26 PROCEDURE — 99232 SBSQ HOSP IP/OBS MODERATE 35: CPT

## 2021-11-26 RX ORDER — SODIUM CHLORIDE 9 MG/ML
10 INJECTION INTRAMUSCULAR; INTRAVENOUS; SUBCUTANEOUS
Refills: 0 | Status: DISCONTINUED | OUTPATIENT
Start: 2021-11-26 | End: 2021-11-27

## 2021-11-26 RX ORDER — ERTAPENEM SODIUM 1 G/1
1000 INJECTION, POWDER, LYOPHILIZED, FOR SOLUTION INTRAMUSCULAR; INTRAVENOUS EVERY 24 HOURS
Refills: 0 | Status: DISCONTINUED | OUTPATIENT
Start: 2021-11-26 | End: 2021-11-27

## 2021-11-26 RX ORDER — CHLORHEXIDINE GLUCONATE 213 G/1000ML
1 SOLUTION TOPICAL
Refills: 0 | Status: DISCONTINUED | OUTPATIENT
Start: 2021-11-26 | End: 2021-11-27

## 2021-11-26 RX ORDER — POVIDONE-IODINE 5 %
1 AEROSOL (ML) TOPICAL
Qty: 15 | Refills: 0
Start: 2021-11-26 | End: 2021-12-10

## 2021-11-26 RX ORDER — INSULIN LISPRO 100/ML
6 VIAL (ML) SUBCUTANEOUS
Refills: 0 | Status: DISCONTINUED | OUTPATIENT
Start: 2021-11-26 | End: 2021-11-27

## 2021-11-26 RX ORDER — INSULIN GLARGINE 100 [IU]/ML
18 INJECTION, SOLUTION SUBCUTANEOUS AT BEDTIME
Refills: 0 | Status: DISCONTINUED | OUTPATIENT
Start: 2021-11-26 | End: 2021-11-27

## 2021-11-26 RX ORDER — INSULIN LISPRO 100/ML
6 VIAL (ML) SUBCUTANEOUS
Qty: 0 | Refills: 0 | DISCHARGE
Start: 2021-11-26

## 2021-11-26 RX ORDER — INSULIN GLARGINE 100 [IU]/ML
18 INJECTION, SOLUTION SUBCUTANEOUS
Qty: 540 | Refills: 0
Start: 2021-11-26 | End: 2021-12-25

## 2021-11-26 RX ADMIN — MEROPENEM 100 MILLIGRAM(S): 1 INJECTION INTRAVENOUS at 06:35

## 2021-11-26 RX ADMIN — Medication 166.67 MILLIGRAM(S): at 13:32

## 2021-11-26 RX ADMIN — INSULIN GLARGINE 18 UNIT(S): 100 INJECTION, SOLUTION SUBCUTANEOUS at 22:00

## 2021-11-26 RX ADMIN — Medication 4 UNIT(S): at 11:44

## 2021-11-26 RX ADMIN — Medication 4: at 08:47

## 2021-11-26 RX ADMIN — Medication 4 UNIT(S): at 08:48

## 2021-11-26 RX ADMIN — Medication 2: at 16:52

## 2021-11-26 RX ADMIN — ERTAPENEM SODIUM 120 MILLIGRAM(S): 1 INJECTION, POWDER, LYOPHILIZED, FOR SOLUTION INTRAMUSCULAR; INTRAVENOUS at 13:56

## 2021-11-26 RX ADMIN — Medication 6 UNIT(S): at 16:52

## 2021-11-26 RX ADMIN — ENOXAPARIN SODIUM 40 MILLIGRAM(S): 100 INJECTION SUBCUTANEOUS at 11:43

## 2021-11-26 NOTE — DISCHARGE NOTE PROVIDER - NSDCPNSUBOBJ_GEN_ALL_CORE
Pt seen and examined    comfortable  has a list of complaints about diff department staff  does not want to got to [facility] and does not want to pay for home anitbx  CM notified about the same    Vital Signs Last 24 Hrs  T(C): 37.2 (11-27-21 @ 12:23), Max: 37.2 (11-27-21 @ 12:23)  T(F): 98.9 (11-27-21 @ 12:23), Max: 98.9 (11-27-21 @ 12:23)  HR: 67 (11-27-21 @ 12:23) (67 - 77)  BP: 110/71 (11-27-21 @ 12:23) (109/68 - 125/74)  BP(mean): --  RR: 16 (11-27-21 @ 12:23) (16 - 19)  SpO2: 93% (11-27-21 @ 12:23) (93% - 97%)    CONSTITUTIONAL: comfortable, pain free  ENMT: Moist oral mucosa, no pharyngeal injection or exudates; normal dentition; No JVD  RESPIRATORY: Normal respiratory effort; lungs are clear to auscultation bilaterally  CARDIOVASCULAR: Regular rate and rhythm, normal S1 and S2, no murmur/rub/gallop; No lower extremity edema; Peripheral pulses are 2+ bilaterally  ABDOMEN: Nontender to palpation, normoactive bowel sounds, no rebound/guarding; No hepatosplenomegaly  MUSCLOSKELETAL:  no clubbing or cyanosis of digits; no joint swelling or tenderness to palpation  PSYCH: A+O to person, place, and time; affect appropriate  NEUROLOGY: CN 2-12 are intact and symmetric; no gross sensory deficits; was observed moving all 4 ext against gravity cooperating with exam.   SKIN:  R foot dressed with ace wrapping by podiatry team; was not able to assess wound personally    55yoM hx DM with neuropathy, s/p partial 5th amputation on R-foot and partial 1st toe amputation on L-foot, not on any DM medications, currently with right foot ulcer for the past 2 months who was advised to go to hospital by outpatient podiatrist for worsening right foot ulcer. He is admitted for diabetic foot infection complicated by Osteomyelitis of 1 toe, started on abc and podiatry team consulted. On 11/23 he went to OR where deep wound culture was obtained with 1st interspace abscess culture, debrided soft tissue, amputation of 1st digit and 1st met head. Deep wound Cx from wound grows Enterococcus feacalis. ID team recommended prolong course of IV Vancomycin. Picc line was placed on 11/26. Patient now is stable for d/c home with close f/u with Podiatry team, ID team and Endocrinologist.     Wound care at home >> apply betadine to R foot inscison site, then applyy 4x4 goase, then ABD pads followed by curlex and ACE wraping.     In regards of  other medical problems:   Psuedo Hyponatremia, has improved with ivf and better glucose control   Uncontrolled DM with hyperglycemia - poorly controlled  - - a1c 12, while in house POC controlled on Lantus 18 qhs and added AC 6 units, advised to f/u with Endo    ertapenem 1 g IV daily end date 12/19/21  - vancomycin 1250mg iv [id number] today 12.9 with AUC/ANTOINE 482- so will c/w same dose for now to avoid nephrotoxicity. end date 12/19/21.   - weekly CBC CMP ESr CRP vanco trough. Labs requested for 11/28/21 and then weekly

## 2021-11-26 NOTE — DISCHARGE NOTE PROVIDER - NSDCMRMEDTOKEN_GEN_ALL_CORE_FT
glucometer (per patient&#x27;s insurance): Test blood sugars four times a day. Dispense #1 glucometer.  glucose tablets: Follow instructions on bottle when sugar is low.  insulin glargine: 18 unit(s) subcutaneous once a day (at bedtime)   Insulin Lispro Quinn KwikPen 100 units/mL injectable solution: 6 unit(s) injectable 3 times a day (before meals)  lancets: 1 application subcutaneously 4 times a day   test strips (per patient&#x27;s insurance): 1 application subcutaneously 4 times a day. ** Compatible with patient&#x27;s glucometer **   Betadine 5% topical cream: Apply topically to affected area once a day   glucometer (per patient&#x27;s insurance): Test blood sugars four times a day. Dispense #1 glucometer.  glucose tablets: Follow instructions on bottle when sugar is low.  insulin glargine: 18 unit(s) subcutaneous once a day (at bedtime)   Insulin Lispro Quinn KwikPen 100 units/mL injectable solution: 6 unit(s) injectable 3 times a day (before meals)  lancets: 1 application subcutaneously 4 times a day   test strips (per patient&#x27;s insurance): 1 application subcutaneously 4 times a day. ** Compatible with patient&#x27;s glucometer **   Betadine 5% topical cream: Apply topically to affected area once a day   ertapenem: 1000 nanogram(s) intravenous once a day  glucometer (per patient&#x27;s insurance): Test blood sugars four times a day. Dispense #1 glucometer.  glucose tablets: Follow instructions on bottle when sugar is low.  insulin glargine: 18 unit(s) subcutaneous once a day (at bedtime)   Insulin Lispro Quinn KwikPen 100 units/mL injectable solution: 6 unit(s) injectable 3 times a day (before meals)  lancets: 1 application subcutaneously 4 times a day   test strips (per patient&#x27;s insurance): 1 application subcutaneously 4 times a day. ** Compatible with patient&#x27;s glucometer **  vancomycin: 1250 milligram(s) intravenous 2 times a day

## 2021-11-26 NOTE — PROGRESS NOTE ADULT - ASSESSMENT
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  Pt reports chronic right that began as a blister in that area and has not completely healed.  Pt reports several days of redness, swelling and sensation of tightness in right foot and ankle for the past several days for which he was started on clindamycin a few days ago on 11/18/21.  Pt reports minimal improvement of above symptoms on the antibiotic and states he developed a new blister on the inner side of his right great toe prompting his podiatrist to refer him to the ED.    Labs notable for leukocytosis, hyponatremia, hyperglycemia, elevated inflammatory markers.  CT RLE with phlegmon, soft tissue gas and suspicious for OM. Plan for OR today    Diabetic foot infection/OM/gas gangrene  Penicillin allergy  Uncontrolled DM    - BCX ngtd  - preop Wound cX e.faecalis, staph aureus and alpha hemolytic strep  - s/p OR 11/22 s/p partial amp of 1st ray  - OR cx +enterococcus, finegoldia magna  - and path pending  - d/w Dr Jain, low concern for residual OM   - picc placed  - s/p clindamycin x 3 days  - dc meropenem-->ertapenem 1 g IV daily end date 12/19/21  - vancomycin 1250mg iv [id number] today 12.9 with AUC/ANTOINE 482. end date 12/19/21  - weekly CBC CMP ESr CRP vanco trough  - orders were called in to [facility]  - wound care per podiatry  - will need ID f/u in 2-3 weeks, may need to extend abx course    plan d/w pt RN and CM 55yoM hx DM with neuropathy, s/p partial 5th amputation on R-foot and partial 1st toe amputation on L-foot, not on any DM medications, currently with right foot ulcer for the past 2 months who was advised to go to hospital by outpatient podiatrist for worsening right foot ulcer.  Pt reports chronic right that began as a blister in that area and has not completely healed.  Pt reports several days of redness, swelling and sensation of tightness in right foot and ankle for the past several days for which he was started on clindamycin a few days ago on 11/18/21.  Pt reports minimal improvement of above symptoms on the antibiotic and states he developed a new blister on the inner side of his right great toe prompting his podiatrist to refer him to the ED.    Labs notable for leukocytosis, hyponatremia, hyperglycemia, elevated inflammatory markers.  CT RLE with phlegmon, soft tissue gas and suspicious for OM. Plan for OR today    Diabetic foot infection/OM/gas gangrene  Penicillin allergy  Uncontrolled DM    - BCX ngtd  - preop Wound cX e.faecalis, staph aureus and alpha hemolytic strep  - s/p OR 11/22 s/p partial amp of 1st ray  - OR cx +enterococcus, finegoldia magna  - and path pending  - d/w Dr Jain, low concern for residual OM   - picc placed  - s/p clindamycin x 3 days  - dc meropenem-->ertapenem 1 g IV daily end date 12/19/21  - vancomycin 1250mg iv [id number] today 12.9 with AUC/ANTOINE 482- so will c/w same dose for now to avoid nephrotoxicity. end date 12/19/21.   - weekly CBC CMP ESr CRP vanco trough. Labs requested for 11/28/21 and then weekly  - orders were called in to [facility], however now possible plan for [facility]. If patient goes to [facility], labs can be followed by Md at facility  - wound care per podiatry  - will need ID f/u in 2-3 weeks, may need to extend abx course    plan d/w pt RN and CM

## 2021-11-26 NOTE — DISCHARGE NOTE PROVIDER - NSDCFUADDINST_GEN_ALL_CORE_FT
Wound care upon discharge:  betadine to incision site right foot cover with 4x4 gauze, abd pad, kerlix, ace bandage     ertapenem 1 g IV daily end date 12/19/21  - vancomycin 1250mg iv [id number] today 12.9 with AUC/ANTOINE 482- so will c/w same dose for now to avoid nephrotoxicity. end date 12/19/21.   - weekly CBC CMP ESr CRP vanco trough. Labs requested for 11/28/21 and then weekly

## 2021-11-26 NOTE — DISCHARGE NOTE PROVIDER - NSDCCPCAREPLAN_GEN_ALL_CORE_FT
PRINCIPAL DISCHARGE DIAGNOSIS  Diagnosis: Acute osteomyelitis  Assessment and Plan of Treatment: - please complete course of IV antibiotics  - we set up infusion for you at home  - follow up wtih Dr. Reynolds - infection disease  - follow up with Dr. Jain - podiatrist      SECONDARY DISCHARGE DIAGNOSES  Diagnosis: Diabetic foot infection  Assessment and Plan of Treatment: - see above    Diagnosis: DM (diabetes mellitus), type 2, uncontrolled  Assessment and Plan of Treatment: - please keep diet  - take insulin as instructed  - follow up with Endocrinologist - Dr. Maldonado     PRINCIPAL DISCHARGE DIAGNOSIS  Diagnosis: Acute osteomyelitis  Assessment and Plan of Treatment: - please complete course of IV antibiotics  - we set up infusion for you at home  - follow up wtih Dr. Reynolds - infection disease  - follow up with Dr. Jain - podiatrist      SECONDARY DISCHARGE DIAGNOSES  Diagnosis: Diabetic foot infection  Assessment and Plan of Treatment: - see above    Diagnosis: DM (diabetes mellitus), type 2, uncontrolled  Assessment and Plan of Treatment: - please keep diet  - take insulin as instructed - Basaglar 18 units at bedtime, and short acting insulin 6 units before each meal.   - follow up with Endocrinologist - Dr. Maldonado     PRINCIPAL DISCHARGE DIAGNOSIS  Diagnosis: Acute osteomyelitis  Assessment and Plan of Treatment: - please complete course of IV antibiotics  - we set up infusion for you at home  - follow up wtih Dr. Reynolds - infection disease  - follow up with Dr. Jain - podiatrist      SECONDARY DISCHARGE DIAGNOSES  Diagnosis: Diabetic foot infection  Assessment and Plan of Treatment: - see above  - continue wound care at home: apply betadine to R foot inscison site, then applyy 4x4 goase, then ABD pads followed by curlex and ACE wraping.    Diagnosis: DM (diabetes mellitus), type 2, uncontrolled  Assessment and Plan of Treatment: - please keep diet  - take insulin as instructed - Basaglar 18 units at bedtime, and short acting insulin 6 units before each meal.   - follow up with Endocrinologist - Dr. Maldonado

## 2021-11-26 NOTE — DISCHARGE NOTE NURSING/CASE MANAGEMENT/SOCIAL WORK - PATIENT PORTAL LINK FT
You can access the FollowMyHealth Patient Portal offered by [facility] by registering at the following website: [email or web address]. By joining [employer]’s FollowMyHealth portal, you will also be able to view your health information using other applications (apps) compatible with our system.

## 2021-11-26 NOTE — PHARMACOTHERAPY INTERVENTION NOTE - COMMENTS
Vanco trough ordered pre-4th for 11/23 at 10am
Vancomycin trough re-ordered for 11/24 at 10am, patient did refused PM dose on 11/22 so trough came back less than 4. Will not change dose.
Ordered pre-4th level for 11/26 for 10AM prior to 12pm dose
Trough came back at 7.2. Increased Vanco from 1g q12 to 1250mg q12

## 2021-11-26 NOTE — DISCHARGE NOTE PROVIDER - PROVIDER TOKENS
PROVIDER:[TOKEN:[[id number]],FOLLOWUP:[2 weeks],ESTABLISHEDPATIENT:[T]],PROVIDER:[TOKEN:[[id number]],FOLLOWUP:[2 weeks],ESTABLISHEDPATIENT:[T]],PROVIDER:[TOKEN:[[id number]],FOLLOWUP:[1 month],ESTABLISHEDPATIENT:[T]]

## 2021-11-26 NOTE — DISCHARGE NOTE PROVIDER - CARE PROVIDER_API CALL
Kavita Reynolds)  Internal Medicine  [address] [place] [address]  Phone: [phone]  Fax: [phone]  Established Patient  Follow Up Time: 2 weeks    Kyle Jain (DPIAN)  Podiatric Medicine and Surgery  [address] [place] [address]  Phone: [phone]  Fax: [phone]  Established Patient  Follow Up Time: 2 weeks    Lilian Maldonado)  EndocrinologyMetabDiabetes; Internal Medicine  [address] [place] [address]  Phone: [phone]  Fax: [phone]  Established Patient  Follow Up Time: 1 month

## 2021-11-26 NOTE — PROGRESS NOTE ADULT - SUBJECTIVE AND OBJECTIVE BOX
· Chief Complaint: The patient is a 55y Male complaining of wound check.  · HPI Objective Statement: 54 y/o male hx dm (no longer taking meds trying to control with diet), chronic right foot ulcer sent by Dr. Jain for iv abx and admission due to worsening foot ulcer. pt on oral clindamycin for several days, blister popped and having red streaking to foot. denies pain, no f/c, no other complaints. anaphylaxis to penicillin per pt.     	ROS: No fever/chills. No eye pain/changes in vision, No ear pain/sore throat/dysphagia, No chest pain/palpitations. No SOB/cough/. No abdominal pain, N/V/D, no black/bloody bm. No dysuria/frequency/discharge, No headache. No Dizziness.    No numbness/tingling/weakness.    Podiatry HPI: Patient is a 51M PMH DM, h/o gout, sent in by Dr. Jain for chronic osteomyelitis of right hallux with wound. Patient states that wound was getting worse and was seen in office on Thursday and received po abx. Patient states that today he noticed a new blister form on the inner side of his right big toe and decided to come to ED after calling Dr. Jain. Patient states he has no pain and no sensation to foot. He states that he has no other pedal complaints. Patient understands he may need amputation of the right big toe. Patient denies any fever, shares that on admission it was 98.7. Denies nausea, vomiting sob, chills, chest pain     Patient admits to  (-) Fevers, (-) Chills, (-) Nausea, (-) Vomiting, (-) Shortness of Breath (-) calf pain (-) chest pain     Podiatry interval HPI: Patient seen in bed, resting comfortably, s/p partial 1st ray resection right foot 11/22/21. Notes some mild, dull discomfort at his right foot with palpation. Denies any acute overnight events. Denies any changes in his symptoms.     Medications acetaminophen     Tablet .. 650 milliGRAM(s) Oral every 6 hours PRN  Dakins Solution - 1/4 Strength 1 Application(s) Topical once  dextrose 40% Gel 15 Gram(s) Oral once  dextrose 5%. 1000 milliLiter(s) IV Continuous <Continuous>  dextrose 5%. 1000 milliLiter(s) IV Continuous <Continuous>  dextrose 50% Injectable 25 Gram(s) IV Push once  dextrose 50% Injectable 12.5 Gram(s) IV Push once  dextrose 50% Injectable 25 Gram(s) IV Push once  enoxaparin Injectable 40 milliGRAM(s) SubCutaneous daily  fentaNYL    Injectable 25 MICROGram(s) IV Push every 5 minutes PRN  glucagon  Injectable 1 milliGRAM(s) IntraMuscular once  influenza   Vaccine 0.5 milliLiter(s) IntraMuscular once  insulin glargine Injectable (LANTUS) 15 Unit(s) SubCutaneous at bedtime  insulin lispro (ADMELOG) corrective regimen sliding scale   SubCutaneous three times a day before meals  insulin lispro (ADMELOG) corrective regimen sliding scale   SubCutaneous at bedtime  insulin lispro Injectable (ADMELOG) 4 Unit(s) SubCutaneous three times a day before meals  lactated ringers. 1000 milliLiter(s) IV Continuous <Continuous>  lidocaine 1% (Preservative-free) Injectable 20 milliLiter(s) Local Injection once  meropenem  IVPB 1000 milliGRAM(s) IV Intermittent every 8 hours  ondansetron Injectable 4 milliGRAM(s) IV Push once PRN  sodium chloride 0.9%. 1000 milliLiter(s) IV Continuous <Continuous>  vancomycin  IVPB 1250 milliGRAM(s) IV Intermittent every 12 hours    FHFHx: diabetes mellitus (Grandparent)    ,   PMHDiabetes mellitus    H/O diabetic neuropathy       PSHHistory of partial amputation of toe        Labs                          13.4   7.67  )-----------( 411      ( 24 Nov 2021 07:40 )             40.2      11-25    x   |  x   |  x   ----------------------------<  x   x    |  x   |  1.03    Ca    8.8      24 Nov 2021 07:40       Vital Signs Last 24 Hrs  T(C): 36.9 (25 Nov 2021 16:45), Max: 37.1 (24 Nov 2021 23:00)  T(F): 98.5 (25 Nov 2021 16:45), Max: 98.7 (24 Nov 2021 23:00)  HR: 67 (25 Nov 2021 16:45) (62 - 67)  BP: 111/73 (25 Nov 2021 16:45) (105/67 - 115/77)  BP(mean): --  RR: 18 (25 Nov 2021 16:45) (16 - 18)  SpO2: 98% (25 Nov 2021 16:45) (93% - 98%)  Sedimentation Rate, Erythrocyte: 44 mm/hr (11-21-21 @ 17:37)         C-Reactive Protein, Serum: 173 mg/L (11-21-21 @ 17:37)       Physical exam   Patient resting comfortably in bed. Patient is AAOx3, ambulation status: walking without limitations     Derm: s/p partial 1st ray resection, noted with sutures in place, intact, with small opening to the distal tip of remaining skin measuring about 1.0 x 1.0 cm, packing noted to be in place. No drainage noted. No malodor. No active purulent drainage. No fluctuance. Maceration noted again    right submet 5 ulcer with serous drainage does not probe to bone. Granular wound base. erythema noted with ischemic changes to distal hallux  Vasc: DP Palpable, PT nonpalpable right, DP and PT palpable to left. edema noted to right hallux Skin temperature noted to be warm to cool  from proximal to distal b/l.   Neuro: Protective and epicritic sensation grossly absent  Musc:  s/p partial 5th, partial 1st ray amp on right, sp partial 1st amp on left      xrays 11/21:  possible soft tissue emphysema noted to right foot first interspace. 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   Intra op culture reviewed, pathology pending   Patient discharged delayed due to insurance issues  Appreciate ID recommendations  Patient is stable for discharge from podiatric standpoint  Patient needs to follow up with Dr. Jain upon discharge  Discussed and seen bedside with attending Dr. Jain    WCO upon discharge: betadine to incision site right foot cover with 4x4 gauze, abd pad, kerlix, ace bandage

## 2021-11-26 NOTE — PROGRESS NOTE ADULT - SUBJECTIVE AND OBJECTIVE BOX
[facility]  INFECTIOUS DISEASES AND INTERNAL MEDICINE at [place]  =======================================================  Siva Reynolds MD  Diplomates American Board of Internal Medicine and Infectious Diseases  Tel: [phone]      Fax: [phone]  =======================================================    ZEINAB SCHNEIDER [id number]    Follow up: OM  seen earlier today  feels well  pain controlled  rue picc placed      Allergies:  Keflex (Other)  penicillin (Anaphylaxis)  sulfa drugs (Other)           REVIEW OF SYSTEMS:  CONSTITUTIONAL:  No Fever or chills  HEENT:   No diplopia or blurred vision.  No earache, sore throat or runny nose.  CARDIOVASCULAR:  No pressure, squeezing, strangling, tightness, heaviness or aching about the chest, neck, axilla or epigastrium.  RESPIRATORY:  No cough, shortness of breath  GASTROINTESTINAL:  No nausea, vomiting or diarrhea.  GENITOURINARY:  No dysuria, frequency or urgency. No Blood in urine  MUSCULOSKELETAL:  no joint aches, no muscle pain  SKIN:  No change in skin, hair or nails.  NEUROLOGIC:  No Headaches, seizures or weakness.  PSYCHIATRIC:  No disorder of thought or mood.  ENDOCRINE:  No heat or cold intolerance  HEMATOLOGICAL:  No easy bruising or bleeding.       Physical Exam:  GEN: NAD, pleasant  HEENT: normocephalic and atraumatic. EOMI. PERRL.  Anicteric   NECK: Supple.   LUNGS: Clear to auscultation.  HEART: Regular rate and rhythm without murmur.  ABDOMEN: Soft, nontender, and nondistended.  Positive bowel sounds.    : No CVA tenderness  EXTREMITIES: Without any edema. stump clean no erythema mild dehiscensce, sututres intact no drainage  MSK: no joint swelling  NEUROLOGIC: Cranial nerves II through XII are grossly intact. No focal deficits  PSYCHIATRIC: Appropriate affect .  SKIN: No Rash      Vitals:    Vital Signs Last 24 Hrs  T(C): 36.8 (26 Nov 2021 17:00), Max: 37.1 (25 Nov 2021 20:00)  T(F): 98.2 (26 Nov 2021 17:00), Max: 98.7 (25 Nov 2021 20:00)  HR: 76 (26 Nov 2021 17:00) (69 - 94)  BP: 125/74 (26 Nov 2021 17:00) (96/62 - 125/74)  BP(mean): --  RR: 19 (26 Nov 2021 17:00) (18 - 19)  SpO2: 95% (26 Nov 2021 17:00) (94% - 98%)    Current Antibiotics:  ertapenem  vancomycin  IVPB 1250 milliGRAM(s) IV Intermittent every 12 hours    Other medications:  Dakins Solution - 1/4 Strength 1 Application(s) Topical once  dextrose 40% Gel 15 Gram(s) Oral once  dextrose 5%. 1000 milliLiter(s) IV Continuous <Continuous>  dextrose 5%. 1000 milliLiter(s) IV Continuous <Continuous>  dextrose 50% Injectable 25 Gram(s) IV Push once  dextrose 50% Injectable 12.5 Gram(s) IV Push once  dextrose 50% Injectable 25 Gram(s) IV Push once  enoxaparin Injectable 40 milliGRAM(s) SubCutaneous daily  glucagon  Injectable 1 milliGRAM(s) IntraMuscular once  influenza   Vaccine 0.5 milliLiter(s) IntraMuscular once  insulin glargine Injectable (LANTUS) 15 Unit(s) SubCutaneous at bedtime  insulin lispro (ADMELOG) corrective regimen sliding scale   SubCutaneous three times a day before meals  insulin lispro (ADMELOG) corrective regimen sliding scale   SubCutaneous at bedtime  insulin lispro Injectable (ADMELOG) 4 Unit(s) SubCutaneous three times a day before meals  lactated ringers. 1000 milliLiter(s) IV Continuous <Continuous>  lidocaine 1% (Preservative-free) Injectable 20 milliLiter(s) Local Injection once  sodium chloride 0.9%. 1000 milliLiter(s) IV Continuous <Continuous>                              13.7   7.30  )-----------( 413      ( 26 Nov 2021 06:09 )             40.3       11-26    133<L>  |  96<L>  |  10.4  ----------------------------<  247<H>  4.5   |  23.0  |  0.99    Ca    8.7      26 Nov 2021 06:09                              CAPILLARY BLOOD GLUCOSE      POCT Blood Glucose.: 162 mg/dL (26 Nov 2021 16:45)              RECENT CULTURES:  11-23 @ 22:30 .Surgical Swab Right Great Toe     Numerous Enterococcus faecalis        11-23 @ 22:24 .Surgical Swab first interspace right foot swab     Numerous Enterococcus faecalis        11-22 @ 01:45 .Surgical Swab right hallux wound Enterococcus faecalis  Staphylococcus aureus    Numerous Enterococcus faecalis  Moderate Alpha hemolytic strep "Susceptibilities not performed"  Few Staphylococcus aureus        11-22 @ 01:36 Clean Catch Clean Catch (Midstream)     No growth        11-21 @ 17:41 .Blood Blood-Peripheral     No growth at 48 hours        11-21 @ 17:40 .Blood Blood-Peripheral     No growth at 48 hours            WBC Count: 7.67 K/uL (11-24-21 @ 07:40)  WBC Count: 11.49 K/uL (11-23-21 @ 07:42)  WBC Count: 15.99 K/uL (11-21-21 @ 17:37)    Creatinine, Serum: 1.03 mg/dL (11-25-21 @ 13:41)  Creatinine, Serum: 1.06 mg/dL (11-24-21 @ 07:40)  Creatinine, Serum: 1.16 mg/dL (11-23-21 @ 07:42)  Creatinine, Serum: 0.91 mg/dL (11-22-21 @ 00:45)  Creatinine, Serum: 0.98 mg/dL (11-21-21 @ 17:37)    C-Reactive Protein, Serum: 173 mg/L (11-21-21 @ 17:37)      Sedimentation Rate, Erythrocyte: 44 mm/hr (11-21-21 @ 17:37)

## 2021-11-26 NOTE — PROGRESS NOTE ADULT - SUBJECTIVE AND OBJECTIVE BOX
[facility] Division of Hospital Medicine - late entry note    Chief Complaint:  diabetic foot infection with om    SUBJECTIVE: reports no med complains    OVERNIGHT EVENTS: none reported     Patient denies chest pain, SOB, abd pain, N/V, fever, chills, dysuria or any other complaints. All remainder ROS negative.     MEDICATIONS  (STANDING):  chlorhexidine 4% Liquid 1 Application(s) Topical <User Schedule>  Dakins Solution - 1/4 Strength 1 Application(s) Topical once  dextrose 40% Gel 15 Gram(s) Oral once  dextrose 5%. 1000 milliLiter(s) (50 mL/Hr) IV Continuous <Continuous>  dextrose 5%. 1000 milliLiter(s) (100 mL/Hr) IV Continuous <Continuous>  dextrose 50% Injectable 25 Gram(s) IV Push once  dextrose 50% Injectable 12.5 Gram(s) IV Push once  dextrose 50% Injectable 25 Gram(s) IV Push once  enoxaparin Injectable 40 milliGRAM(s) SubCutaneous daily  glucagon  Injectable 1 milliGRAM(s) IntraMuscular once  influenza   Vaccine 0.5 milliLiter(s) IntraMuscular once  insulin glargine Injectable (LANTUS) 15 Unit(s) SubCutaneous at bedtime  insulin lispro (ADMELOG) corrective regimen sliding scale   SubCutaneous three times a day before meals  insulin lispro (ADMELOG) corrective regimen sliding scale   SubCutaneous at bedtime  insulin lispro Injectable (ADMELOG) 4 Unit(s) SubCutaneous three times a day before meals  lactated ringers. 1000 milliLiter(s) (75 mL/Hr) IV Continuous <Continuous>  lidocaine 1% (Preservative-free) Injectable 20 milliLiter(s) Local Injection once  meropenem  IVPB 1000 milliGRAM(s) IV Intermittent every 8 hours  sodium chloride 0.9%. 1000 milliLiter(s) (75 mL/Hr) IV Continuous <Continuous>  vancomycin  IVPB 1250 milliGRAM(s) IV Intermittent every 12 hours    MEDICATIONS  (PRN):  acetaminophen     Tablet .. 650 milliGRAM(s) Oral every 6 hours PRN Temp greater or equal to 38C (100.4F), Mild Pain (1 - 3), Moderate Pain (4 - 6)  fentaNYL    Injectable 25 MICROGram(s) IV Push every 5 minutes PRN Moderate Pain (4 - 6)  ondansetron Injectable 4 milliGRAM(s) IV Push once PRN Nausea and/or Vomiting  sodium chloride 0.9% lock flush 10 milliLiter(s) IV Push every 1 hour PRN Pre/post blood products, medications, blood draw, and to maintain line patency          I&O's Summary      PHYSICAL EXAM:  Vital Signs Last 24 Hrs  T(C): 36.8 (25 Nov 2021 09:48), Max: 37.1 (24 Nov 2021 10:54)  T(F): 98.2 (25 Nov 2021 09:48), Max: 98.8 (24 Nov 2021 10:54)  HR: 65 (25 Nov 2021 09:48) (62 - 70)  BP: 105/67 (25 Nov 2021 09:48) (104/67 - 115/77)  BP(mean): --  RR: 18 (25 Nov 2021 09:48) (16 - 18)  SpO2: 95% (25 Nov 2021 09:48) (93% - 96%)        CONSTITUTIONAL: NAD, well-developed, well-groomed  ENMT: Moist oral mucosa, no pharyngeal injection or exudates; normal dentition; No JVD  RESPIRATORY: Normal respiratory effort; lungs are clear to auscultation bilaterally  CARDIOVASCULAR: Regular rate and rhythm, normal S1 and S2, no murmur/rub/gallop; No lower extremity edema; Peripheral pulses are 2+ bilaterally  ABDOMEN: Nontender to palpation, normoactive bowel sounds, no rebound/guarding; No hepatosplenomegaly  MUSCLOSKELETAL:  no clubbing or cyanosis of digits; no joint swelling or tenderness to palpation  PSYCH: A+O to person, place, and time; affect appropriate  NEUROLOGY: CN 2-12 are intact and symmetric; no gross sensory deficits; was observed moving all 4 ext against gravity cooperating with exam.   SKIN:  R foot dressed with ace wrapping by podiatry team; was not able to assess wound personally      LABS:                        13.7   7.30  )-----------( 413      ( 26 Nov 2021 06:09 )             40.3     11-26    133<L>  |  96<L>  |  10.4  ----------------------------<  247<H>  4.5   |  23.0  |  0.99    Ca    8.7      26 Nov 2021 06:09                Culture - Acid Fast - Other w/Smear (collected 23 Nov 2021 22:30)  Source: .Other Right Great Toe    Culture - Fungal, Other (collected 23 Nov 2021 22:30)  Source: .Other Right Great Toe  Preliminary Report (26 Nov 2021 09:27):    Testing in progress    Culture - Surgical Swab (collected 23 Nov 2021 22:30)  Source: .Surgical Swab Right Great Toe  Preliminary Report (25 Nov 2021 14:07):    Numerous Enterococcus faecalis    Culture - Fungal, Other (collected 23 Nov 2021 22:24)  Source: .Other abcess 1st interspace right toe (swab)  Preliminary Report (24 Nov 2021 06:41):    Testing in progress    Culture - Acid Fast - Other w/Smear (collected 23 Nov 2021 22:24)  Source: .Other abcess 1st interspace right foot(swab)    Culture - Surgical Swab (collected 23 Nov 2021 22:24)  Source: .Surgical Swab first interspace right foot swab  Preliminary Report (25 Nov 2021 23:06):    Numerous Enterococcus faecalis    Numerous Finegoldia magna "Susceptibilities not performed"  Organism: Enterococcus faecalis (25 Nov 2021 22:59)  Organism: Enterococcus faecalis (25 Nov 2021 22:59)      CAPILLARY BLOOD GLUCOSE      POCT Blood Glucose.: 148 mg/dL (26 Nov 2021 11:42)  POCT Blood Glucose.: 232 mg/dL (26 Nov 2021 08:12)  POCT Blood Glucose.: 190 mg/dL (25 Nov 2021 21:56)  POCT Blood Glucose.: 163 mg/dL (25 Nov 2021 16:37)  POCT Blood Glucose.: 253 mg/dL (25 Nov 2021 12:03)        RADIOLOGY & ADDITIONAL TESTS:  Results Reviewed:   Imaging Personally Reviewed:  Electrocardiogram Personally Reviewed:

## 2021-11-26 NOTE — DISCHARGE NOTE PROVIDER - HOSPITAL COURSE
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 He is admitted for diabetic foot infection complicated by Osteomyelitis of 1 toe, started on abc and podiatry team consulted. On 11/23 he went to OR where deep wound culture was obtained with 1st interspace abscess culture, debrided soft tissue, amputation of 1st digit and 1st met head. Deep wound Cx from wound grows Enterococcus feacalis. ID team recommended prolong course of IV Vancomycin. Picc line was placed on 11/26. Patient now is stable for d/c home with close f/u with Podiatry team, ID team and Endocrinologist.     In regards of  other medical problems:   Hyponatremia, has improved with ivf and better glucose control   Uncontrolled DM with hyperglycemia - poorly controlled  - - a1c 12, while in house POC controlled on Lantus 18 qhs and added AC 6 units, advised to f/u with Endo       spent 35 min to coordinate d/c 55yoM hx DM with neuropathy, s/p partial 5th amputation on R-foot and partial 1st toe amputation on L-foot, not on any DM medications, currently with right foot ulcer for the past 2 months who was advised to go to hospital by outpatient podiatrist for worsening right foot ulcer. He is admitted for diabetic foot infection complicated by Osteomyelitis of 1 toe, started on abc and podiatry team consulted. On 11/23 he went to OR where deep wound culture was obtained with 1st interspace abscess culture, debrided soft tissue, amputation of 1st digit and 1st met head. Deep wound Cx from wound grows Enterococcus feacalis. ID team recommended prolong course of IV Vancomycin. Picc line was placed on 11/26. Patient now is stable for d/c home with close f/u with Podiatry team, ID team and Endocrinologist.     Wound care at home >> apply betadine to R foot inscison site, then applyy 4x4 goase, then ABD pads followed by curlex and ACE wraping.     In regards of  other medical problems:   Hyponatremia, has improved with ivf and better glucose control   Uncontrolled DM with hyperglycemia - poorly controlled  - - a1c 12, while in house POC controlled on Lantus 18 qhs and added AC 6 units, advised to f/u with Endo       spent 35 min to coordinate d/c 55yoM hx DM with neuropathy, s/p partial 5th amputation on R-foot and partial 1st toe amputation on L-foot, not on any DM medications, currently with right foot ulcer for the past 2 months who was advised to go to hospital by outpatient podiatrist for worsening right foot ulcer. He is admitted for diabetic foot infection complicated by Osteomyelitis of 1 toe, started on abc and podiatry team consulted. On 11/23 he went to OR where deep wound culture was obtained with 1st interspace abscess culture, debrided soft tissue, amputation of 1st digit and 1st met head. Deep wound Cx from wound grows Enterococcus feacalis. ID team recommended prolong course of IV Vancomycin. Picc line was placed on 11/26. Patient now is stable for d/c home with close f/u with Podiatry team, ID team and Endocrinologist.     Wound care at home >> apply betadine to R foot inscison site, then applyy 4x4 goase, then ABD pads followed by curlex and ACE wraping.     In regards of  other medical problems:   Psuedo Hyponatremia, has improved with ivf and better glucose control   Uncontrolled DM with hyperglycemia - poorly controlled  - - a1c 12, while in house POC controlled on Lantus 18 qhs and added AC 6 units, advised to f/u with Endo       spent 35 min to coordinate d/c

## 2021-11-26 NOTE — PHARMACOTHERAPY INTERVENTION NOTE - NSPHARMCOMMASP
ASP - Lab/ test recommended
ASP - Dose optimization/Non-Renal dose adjustment

## 2021-11-26 NOTE — PROGRESS NOTE ADULT - ASSESSMENT
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 He is admitted for diabetic foot infection, started on abc and podiatry team consulted. On 11/23 he went to OR where deep wound culture was obtained with 1st interspace abscess culture, debrided soft tissue, amputation of 1st digit and 1st met head. Deep wound Cx from wound grows Enterococcus feacalis. ID team recommended prolong course of IV Vancomycin. Picc line was placed on 11/26. Patient now is stable for d/c home with close f/u with Podiatry team, ID team and Endocrinologist     #Diabetic foot infection c/b osteomyelitis of 1st   -c/w  vancomycin, nicole for now   - ID team, podiatry team consult noted,  pt will need prolong course of IV Abx ( most likely Vancomycin IV - pending final recommendation from ID team)  - picc line placed on 11/26  - CT R-foot suggestive OM as well as intraoperative findings, infected bones were removed surgically as podiatry team  - Pain control with Tylenol and IV Toradol PRN  -wound care as per podiatry team >> Applied betadine, DSD to the area.  - CM team notified on home care needs    Leukocytosis  -Due to above infection, trend CBC    Hyponatremia, has improved  - bmp daily for now and hyperglycemia control as bellow    Uncontrolled DM with hyperglycemia  - a1c 12  -c/w  Lantus 18 qhs and added AC 6 units, c/w MDSS, will adjust insulin base on POC  -  Endocrinologist consult noted  - diabetic teaching  - will continue  to educate pt daily     DVT ppx - SCD for today, will start Lovenox in am  code - full  Dispo - pending final decision on Abx as well home infusion at home;

## 2021-11-27 VITALS
DIASTOLIC BLOOD PRESSURE: 71 MMHG | TEMPERATURE: 99 F | RESPIRATION RATE: 16 BRPM | OXYGEN SATURATION: 93 % | SYSTOLIC BLOOD PRESSURE: 110 MMHG | HEART RATE: 67 BPM

## 2021-11-27 LAB
CULTURE RESULTS: SIGNIFICANT CHANGE UP
GLUCOSE BLDC GLUCOMTR-MCNC: 120 MG/DL — HIGH (ref 70–99)
GLUCOSE BLDC GLUCOMTR-MCNC: 140 MG/DL — HIGH (ref 70–99)
ORGANISM # SPEC MICROSCOPIC CNT: SIGNIFICANT CHANGE UP
SPECIMEN SOURCE: SIGNIFICANT CHANGE UP
VANCOMYCIN TROUGH SERPL-MCNC: 14.9 UG/ML — SIGNIFICANT CHANGE UP (ref 10–20)

## 2021-11-27 PROCEDURE — 82565 ASSAY OF CREATININE: CPT

## 2021-11-27 PROCEDURE — 87186 SC STD MICRODIL/AGAR DIL: CPT

## 2021-11-27 PROCEDURE — 85027 COMPLETE CBC AUTOMATED: CPT

## 2021-11-27 PROCEDURE — 93005 ELECTROCARDIOGRAM TRACING: CPT

## 2021-11-27 PROCEDURE — 85610 PROTHROMBIN TIME: CPT

## 2021-11-27 PROCEDURE — 80202 ASSAY OF VANCOMYCIN: CPT

## 2021-11-27 PROCEDURE — 96374 THER/PROPH/DIAG INJ IV PUSH: CPT

## 2021-11-27 PROCEDURE — 99239 HOSP IP/OBS DSCHRG MGMT >30: CPT

## 2021-11-27 PROCEDURE — 86140 C-REACTIVE PROTEIN: CPT

## 2021-11-27 PROCEDURE — 87206 SMEAR FLUORESCENT/ACID STAI: CPT

## 2021-11-27 PROCEDURE — 82962 GLUCOSE BLOOD TEST: CPT

## 2021-11-27 PROCEDURE — 87637 SARSCOV2&INF A&B&RSV AMP PRB: CPT

## 2021-11-27 PROCEDURE — 80053 COMPREHEN METABOLIC PANEL: CPT

## 2021-11-27 PROCEDURE — 83605 ASSAY OF LACTIC ACID: CPT

## 2021-11-27 PROCEDURE — 73701 CT LOWER EXTREMITY W/DYE: CPT

## 2021-11-27 PROCEDURE — 88311 DECALCIFY TISSUE: CPT

## 2021-11-27 PROCEDURE — 71045 X-RAY EXAM CHEST 1 VIEW: CPT

## 2021-11-27 PROCEDURE — 87040 BLOOD CULTURE FOR BACTERIA: CPT

## 2021-11-27 PROCEDURE — 85730 THROMBOPLASTIN TIME PARTIAL: CPT

## 2021-11-27 PROCEDURE — 88305 TISSUE EXAM BY PATHOLOGIST: CPT

## 2021-11-27 PROCEDURE — 87077 CULTURE AEROBIC IDENTIFY: CPT

## 2021-11-27 PROCEDURE — 81001 URINALYSIS AUTO W/SCOPE: CPT

## 2021-11-27 PROCEDURE — 80061 LIPID PANEL: CPT

## 2021-11-27 PROCEDURE — 85652 RBC SED RATE AUTOMATED: CPT

## 2021-11-27 PROCEDURE — 87102 FUNGUS ISOLATION CULTURE: CPT

## 2021-11-27 PROCEDURE — 86769 SARS-COV-2 COVID-19 ANTIBODY: CPT

## 2021-11-27 PROCEDURE — 36415 COLL VENOUS BLD VENIPUNCTURE: CPT

## 2021-11-27 PROCEDURE — 85025 COMPLETE CBC W/AUTO DIFF WBC: CPT

## 2021-11-27 PROCEDURE — 87116 MYCOBACTERIA CULTURE: CPT

## 2021-11-27 PROCEDURE — 96375 TX/PRO/DX INJ NEW DRUG ADDON: CPT

## 2021-11-27 PROCEDURE — 73630 X-RAY EXAM OF FOOT: CPT

## 2021-11-27 PROCEDURE — 87086 URINE CULTURE/COLONY COUNT: CPT

## 2021-11-27 PROCEDURE — 86703 HIV-1/HIV-2 1 RESULT ANTBDY: CPT

## 2021-11-27 PROCEDURE — 80048 BASIC METABOLIC PNL TOTAL CA: CPT

## 2021-11-27 PROCEDURE — 99285 EMERGENCY DEPT VISIT HI MDM: CPT | Mod: 25

## 2021-11-27 PROCEDURE — 83036 HEMOGLOBIN GLYCOSYLATED A1C: CPT

## 2021-11-27 PROCEDURE — 87075 CULTR BACTERIA EXCEPT BLOOD: CPT

## 2021-11-27 PROCEDURE — 87070 CULTURE OTHR SPECIMN AEROBIC: CPT

## 2021-11-27 RX ORDER — ERTAPENEM SODIUM 1 G/1
1000 INJECTION, POWDER, LYOPHILIZED, FOR SOLUTION INTRAMUSCULAR; INTRAVENOUS
Qty: 0 | Refills: 0 | DISCHARGE
Start: 2021-11-27

## 2021-11-27 RX ORDER — VANCOMYCIN HCL 1 G
1250 VIAL (EA) INTRAVENOUS
Qty: 0 | Refills: 0 | DISCHARGE
Start: 2021-11-27

## 2021-11-27 RX ADMIN — Medication 166.67 MILLIGRAM(S): at 00:00

## 2021-11-27 RX ADMIN — ENOXAPARIN SODIUM 40 MILLIGRAM(S): 100 INJECTION SUBCUTANEOUS at 11:57

## 2021-11-27 RX ADMIN — Medication 6 UNIT(S): at 08:42

## 2021-11-27 RX ADMIN — Medication 6 UNIT(S): at 11:57

## 2021-11-27 RX ADMIN — ERTAPENEM SODIUM 120 MILLIGRAM(S): 1 INJECTION, POWDER, LYOPHILIZED, FOR SOLUTION INTRAMUSCULAR; INTRAVENOUS at 11:57

## 2021-11-27 RX ADMIN — Medication 166.67 MILLIGRAM(S): at 11:57

## 2021-11-27 RX ADMIN — CHLORHEXIDINE GLUCONATE 1 APPLICATION(S): 213 SOLUTION TOPICAL at 06:04

## 2021-11-27 NOTE — PROGRESS NOTE ADULT - SUBJECTIVE AND OBJECTIVE BOX
· Chief Complaint: The patient is a 55y Male complaining of wound check.  · HPI Objective Statement: 56 y/o male hx dm (no longer taking meds trying to control with diet), chronic right foot ulcer sent by Dr. Jain for iv abx and admission due to worsening foot ulcer. pt on oral clindamycin for several days, blister popped and having red streaking to foot. denies pain, no f/c, no other complaints. anaphylaxis to penicillin per pt.     	ROS: No fever/chills. No eye pain/changes in vision, No ear pain/sore throat/dysphagia, No chest pain/palpitations. No SOB/cough/. No abdominal pain, N/V/D, no black/bloody bm. No dysuria/frequency/discharge, No headache. No Dizziness.    No numbness/tingling/weakness.    Podiatry HPI: Patient is a 51M PMH DM, h/o gout, sent in by Dr. Jain for chronic osteomyelitis of right hallux with wound. Patient states that wound was getting worse and was seen in office on Thursday and received po abx. Patient states that today he noticed a new blister form on the inner side of his right big toe and decided to come to ED after calling Dr. Jain. Patient states he has no pain and no sensation to foot. He states that he has no other pedal complaints. Patient understands he may need amputation of the right big toe. Patient denies any fever, shares that on admission it was 98.7. Denies nausea, vomiting sob, chills, chest pain     Patient admits to  (-) Fevers, (-) Chills, (-) Nausea, (-) Vomiting, (-) Shortness of Breath (-) calf pain (-) chest pain     Podiatry interval HPI: Patient seen in bed, resting comfortably, s/p partial 1st ray resection right foot 11/22/21. Notes some mild, dull discomfort at his right foot with palpation that is consistent with prior examinations. Denies any acute overnight events. Denies any changes in his symptoms.     Medications acetaminophen     Tablet .. 650 milliGRAM(s) Oral every 6 hours PRN  chlorhexidine 4% Liquid 1 Application(s) Topical <User Schedule>  Dakins Solution - 1/4 Strength 1 Application(s) Topical once  dextrose 40% Gel 15 Gram(s) Oral once  dextrose 5%. 1000 milliLiter(s) IV Continuous <Continuous>  dextrose 5%. 1000 milliLiter(s) IV Continuous <Continuous>  dextrose 50% Injectable 25 Gram(s) IV Push once  dextrose 50% Injectable 25 Gram(s) IV Push once  dextrose 50% Injectable 12.5 Gram(s) IV Push once  enoxaparin Injectable 40 milliGRAM(s) SubCutaneous daily  ertapenem  IVPB 1000 milliGRAM(s) IV Intermittent every 24 hours  fentaNYL    Injectable 25 MICROGram(s) IV Push every 5 minutes PRN  glucagon  Injectable 1 milliGRAM(s) IntraMuscular once  influenza   Vaccine 0.5 milliLiter(s) IntraMuscular once  insulin glargine Injectable (LANTUS) 18 Unit(s) SubCutaneous at bedtime  insulin lispro (ADMELOG) corrective regimen sliding scale   SubCutaneous three times a day before meals  insulin lispro (ADMELOG) corrective regimen sliding scale   SubCutaneous at bedtime  insulin lispro Injectable (ADMELOG) 6 Unit(s) SubCutaneous three times a day before meals  lidocaine 1% (Preservative-free) Injectable 20 milliLiter(s) Local Injection once  ondansetron Injectable 4 milliGRAM(s) IV Push once PRN  sodium chloride 0.9% lock flush 10 milliLiter(s) IV Push every 1 hour PRN  vancomycin  IVPB 1250 milliGRAM(s) IV Intermittent every 12 hours    FHFHx: diabetes mellitus (Grandparent)    ,   PMHDiabetes mellitus    H/O diabetic neuropathy       PSHHistory of partial amputation of toe        Labs                          13.7   7.30  )-----------( 413      ( 26 Nov 2021 06:09 )             40.3      11-26    133<L>  |  96<L>  |  10.4  ----------------------------<  247<H>  4.5   |  23.0  |  0.99    Ca    8.7      26 Nov 2021 06:09       Vital Signs Last 24 Hrs  T(C): 36.8 (27 Nov 2021 04:08), Max: 36.8 (26 Nov 2021 17:00)  T(F): 98.3 (27 Nov 2021 04:08), Max: 98.3 (27 Nov 2021 04:08)  HR: 67 (27 Nov 2021 04:08) (67 - 77)  BP: 109/68 (27 Nov 2021 04:08) (109/68 - 125/74)  BP(mean): --  RR: 18 (27 Nov 2021 04:08) (18 - 19)  SpO2: 94% (27 Nov 2021 04:08) (94% - 97%)  Sedimentation Rate, Erythrocyte: 44 mm/hr (11-21-21 @ 17:37)         C-Reactive Protein, Serum: 173 mg/L (11-21-21 @ 17:37)       Physical exam   Patient resting comfortably in bed. Patient is AAOx3, ambulation status: walking without limitations     Derm: s/p partial 1st ray resection, noted with sutures in place, intact, with distal hole now closed by sutures which are noted to be intact, without dehiscence. No drainage noted. No malodor. No active purulent drainage. No fluctuance. Scant amount of maceration to the lateral portion of the incision site. No drainage with light palpation.     right submet 5 ulcer with serous drainage does not probe to bone. Granular wound base. erythema noted with ischemic changes to distal hallux  Vasc: DP Palpable, PT nonpalpable right, DP and PT palpable to left. edema noted to right hallux Skin temperature noted to be warm to cool  from proximal to distal b/l.   Neuro: Protective and epicritic sensation grossly absent  Musc:  s/p partial 5th, partial 1st ray amp on right, sp partial 1st amp on left      xrays 11/21:  possible soft tissue emphysema noted to right foot first interspace. 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   Intra op culture reviewed, pathology pending   Patient discharged delayed due to insurance issues  Appreciate ID recommendations  Patient is stable for discharge from podiatric standpoint  Patient needs to follow up with Dr. Jain upon discharge  Discussed and seen bedside with attending Dr. Jain    WCO upon discharge: betadine to incision site right foot cover with 4x4 gauze, abd pad, kerlix, ace bandage

## 2021-11-27 NOTE — PROGRESS NOTE ADULT - REASON FOR ADMISSION
Right foot ulcer with suspected osteomyelitis

## 2021-11-27 NOTE — PROGRESS NOTE ADULT - PROVIDER SPECIALTY LIST ADULT
Hospitalist
Infectious Disease
Podiatry
Endocrinology
Endocrinology
Infectious Disease
Podiatry
Podiatry
Hospitalist
Hospitalist
Podiatry
Hospitalist
Hospitalist
Infectious Disease

## 2021-11-29 PROBLEM — Z86.39 PERSONAL HISTORY OF OTHER ENDOCRINE, NUTRITIONAL AND METABOLIC DISEASE: Chronic | Status: ACTIVE | Noted: 2021-11-22

## 2021-11-29 PROBLEM — E11.9 TYPE 2 DIABETES MELLITUS WITHOUT COMPLICATIONS: Chronic | Status: ACTIVE | Noted: 2021-11-22

## 2021-12-06 LAB — SURGICAL PATHOLOGY STUDY: SIGNIFICANT CHANGE UP

## 2021-12-13 ENCOUNTER — APPOINTMENT (OUTPATIENT)
Dept: INTERNAL MEDICINE | Facility: CLINIC | Age: 56
End: 2021-12-13
Payer: COMMERCIAL

## 2021-12-13 VITALS
WEIGHT: 205 LBS | DIASTOLIC BLOOD PRESSURE: 86 MMHG | BODY MASS INDEX: 31.07 KG/M2 | OXYGEN SATURATION: 97 % | HEART RATE: 80 BPM | TEMPERATURE: 98.7 F | HEIGHT: 68 IN | SYSTOLIC BLOOD PRESSURE: 128 MMHG

## 2021-12-13 PROCEDURE — 36415 COLL VENOUS BLD VENIPUNCTURE: CPT

## 2021-12-13 PROCEDURE — 99212 OFFICE O/P EST SF 10 MIN: CPT | Mod: 25

## 2021-12-22 ENCOUNTER — APPOINTMENT (OUTPATIENT)
Dept: ENDOCRINOLOGY | Facility: CLINIC | Age: 56
End: 2021-12-22
Payer: COMMERCIAL

## 2021-12-22 VITALS
WEIGHT: 195 LBS | HEART RATE: 76 BPM | BODY MASS INDEX: 29.55 KG/M2 | HEIGHT: 68 IN | SYSTOLIC BLOOD PRESSURE: 138 MMHG | DIASTOLIC BLOOD PRESSURE: 86 MMHG

## 2021-12-22 LAB
CULTURE RESULTS: SIGNIFICANT CHANGE UP
CULTURE RESULTS: SIGNIFICANT CHANGE UP
GLUCOSE BLDC GLUCOMTR-MCNC: 200
SPECIMEN SOURCE: SIGNIFICANT CHANGE UP
SPECIMEN SOURCE: SIGNIFICANT CHANGE UP

## 2021-12-22 PROCEDURE — 82962 GLUCOSE BLOOD TEST: CPT

## 2021-12-22 PROCEDURE — 99214 OFFICE O/P EST MOD 30 MIN: CPT | Mod: 25

## 2021-12-22 NOTE — HISTORY OF PRESENT ILLNESS
[FreeTextEntry1] : Last seen in office 3/2020.\par Pt being seen got HFU, was in [facility] fro, 11/21/21-11/27/21.\par 55y y/o M presents with right foot ulcer with suspected oseteomyelitis. Hx DM\par with neuropathy, s/p partial 5th amputation on R-foot and partial 1st toe\par amputation on L-foot, not on any DM medications, presented with right foot\par ulcer for the past 2 months who was advised to go to hospital by outpatient\par podiatrist for worsening right foot ulcer. Pt reports chronic right that began\par as a blister in that area and has not completely healed. Pt reports several\par days of redness, swelling and sensation of tightness in right foot and ankle\par for the past several days for which he was started on clindamycin a few days\par ago on 11/18/21. Pt reported minimal improvement of above symptoms on the\par antibiotic and states he developed a new blister on the inner side of his right\par great toe prompting his podiatrist to refer him to the ED.\par - DM hx: DM type 2, a1c 12.9% on admission. He reports being diabetic for more\par than 10 years, admits to being non compliant, was on metformin for some time\par which he self d/esvin due to adverse GI effects, also was on lantus for some\par time then stopped, was not taking anything for last 1 yr. Also not checking\par sugars. He is very reluctant to starting insulin or any other injectable\par diabetic meds.\par \par \par Currently has PICC for IV antibiotics, Vancomycin BID and Ertapenam once a day. has 2 weeks left of treatment. \par \par Quality: Type 2 DM\par Severity: uncontrolled \par Duration: 8 years ago \par Onset: cellulitis, A1C 11\par controlled with Metformin a low carb diet \par Modifying Factors: Better with medication \par Associated Symptoms: neuropathy. retinopathy \par \par Current Regimen:\par Lantus 18 units at night \par Ademlog 6 units TID AC \par Metformin in past, could not tolerate \par \par Self blood sugar monitoring: 3-4 times a day \par BB: 120-170s \par \par Current , apple and banana nut bread \par  \par HgbA1C: 12.9% \par \par exercise: not able to due to osteomyelitis \par \par Diet: never had a bad diet. \par B- eggs, yogurt\par L- salad with chicken\par D- chicken, beans, vegetables\par Snacks - nuts, fruit \par \par Drinking issue 6 days a week for 6 months, where he was living was like a Frat house in [place], believes this is what caused increase in HgbA1C.\par Drunk everyday, drinks Liquor,\par Has not had a drink in 2 months \par \par \par Date of last eye exam: over 2 years ago, - DR \par Date of last foot exam: Last appt was Thursday, goes weekly \par Date of last flu vaccine: no \par Date of last Pneumovax: no \par

## 2021-12-22 NOTE — PHYSICAL EXAM
[Alert] : alert [Normal Sclera/Conjunctiva] : normal sclera/conjunctiva [Normal Hearing] : hearing was normal [No Neck Mass] : no neck mass was observed [Thyroid Not Enlarged] : the thyroid was not enlarged [No Respiratory Distress] : no respiratory distress [No Accessory Muscle Use] : no accessory muscle use [Clear to Auscultation] : lungs were clear to auscultation bilaterally [Normal S1, S2] : normal S1 and S2 [Normal Rate] : heart rate was normal [No Stigmata of Cushings Syndrome] : no stigmata of Cushings Syndrome [Right foot was examined, including] : right foot ~C was examined, including visual inspection with sensory and pulse exams [Left foot was examined, including] : left foot ~C was examined, including visual inspection with sensory and pulse exams [Delayed in the Right Toes] : normal in the toes [Normal] : normal [Delayed in the Left Toes] : normal in the toes [Diminished Throughout Both Feet] : diminished tactile sensation with monofilament testing throughout both feet [Oriented x3] : oriented to person, place, and time

## 2021-12-31 NOTE — HISTORY OF PRESENT ILLNESS
[FreeTextEntry1] : admitted to [facility] 11/21/21-11/27/21\par \par 55yoM hx DM with neuropathy, s/p partial 5th amputation on R-foot and partial 1st toe amputation on L-foot, had right foot ulcer for the past 2 months who was advised to go to hospital by outpatient podiatrist for worsening right foot ulcer.  \par at [facility] Labs notable for leukocytosis, hyponatremia, hyperglycemia, elevated inflammatory markers.  CT RLE with phlegmon, soft tissue gas and suspicious for OM. Was admitted for\par \par Diabetic foot infection/OM/gas gangrene\par Penicillin allergy\par Uncontrolled DM\par \par - BCX ngtd\par - preop Wound cX e.faecalis, staph aureus and alpha hemolytic strep\par - s/p OR 11/22 s/p partial amp of 1st ray\par - OR cx +enterococcus, finegoldia magna\par - path + OM\par - s/p clindamycin x 3 days\par - was on meropenem in hospital discharged with picc on-->ertapenem 1 g IV daily end date 12/19/21\par - vancomycin 1250mg iv q12h-. end date 12/19/21. \par - weekly CBC CMP ESr CRP vanco trough\par \par patient here for f/u visit. Feels well. PICC is working well, however reports difficulty with obtaining weekly blood samples and visiting RN has to attempt venipuncture several times. Requesting labs be performed today-was due for RN visit at home today. No fever no chills. Has seen podiatry, wound healing well. has not seen endo yet. Looking for PCP\par

## 2021-12-31 NOTE — PHYSICAL EXAM
[General Appearance - Alert] : alert [General Appearance - In No Acute Distress] : in no acute distress [Outer Ear] : the ears and nose were normal in appearance [Oropharynx] : the oropharynx was normal with no thrush [Auscultation Breath Sounds / Voice Sounds] : lungs were clear to auscultation bilaterally [Heart Rate And Rhythm] : heart rate was normal and rhythm regular [Heart Sounds] : normal S1 and S2 [Heart Sounds Gallop] : no gallops [Murmurs] : no murmurs [Heart Sounds Pericardial Friction Rub] : no pericardial rub [Bowel Sounds] : normal bowel sounds [Abdomen Soft] : soft [Abdomen Tenderness] : non-tender [Abdomen Mass (___ Cm)] : no abdominal mass palpated [Costovertebral Angle Tenderness] : no CVA tenderness [No Palpable Adenopathy] : no palpable adenopathy [FreeTextEntry1] : rt big toe stump wound partially healed no erythema drainage or tenderness [Skin Color & Pigmentation] : normal skin color and pigmentation [] : no rash [Deep Tendon Reflexes (DTR)] : deep tendon reflexes were 2+ and symmetric [Sensation] : the sensory exam was normal to light touch and pinprick [No Focal Deficits] : no focal deficits

## 2021-12-31 NOTE — ASSESSMENT
[FreeTextEntry1] : Diabetic foot infection/OM on IV abx\par Penicillin allergy\par Uncontrolled DM\par \par - BCX ngtd\par - preop Wound cX e.faecalis, staph aureus and alpha hemolytic strep\par - s/p OR 11/22 s/p partial amp of 1st ray\par - OR cx +enterococcus, finegoldia magna\par -fungal cx of toe abscess rare c.albicans however fungal cx of toe ngtd at 4 weeks-c.albicans may be normal yumiko/contaminant, will not treat specifically as washout has been performed and did not grow in bone\par - path + OM\par - s/p clindamycin x 3 days\par - was on meropenem in hospital discharged with picc on-->ertapenem 1 g IV daily end date 12/19/21\par - vancomycin 1250mg iv q12h-. end date 12/19/21. (patient has h/o penicillin allergy and hence need for vancomycin)\par - will check labs today, patient took vancomycin this morning before his appointment so vanco level may not be accurate\par \par Appears to be clinically improving. big toe stump has not healed completely yet. Will extend abx course for another 2 weeks [facility] Asked for weekly labs to be sent to me.\par \par Advised endo f/u for imporved glycemic control needed for wound healing\par c/w podiatry f/u to ensure wound healing\par can f/u after completing abx\par  [Treatment Education] : treatment education [Treatment Adherence] : treatment adherence [Nutritional / Food Issues] : nutritional/food issues [Universal Precautions] : universal precautions [Medical Care Issues] : medical care issues [Anticipatory Guidance] : anticipatory guidance

## 2022-01-01 NOTE — BRIEF OPERATIVE NOTE - SPECIMENS
Deep wound culture, 1st interspace abscess culture, Debrided soft tissue, amputated 1st digit, 1st met head Statement Selected

## 2022-01-03 ENCOUNTER — APPOINTMENT (OUTPATIENT)
Dept: INTERNAL MEDICINE | Facility: CLINIC | Age: 57
End: 2022-01-03
Payer: COMMERCIAL

## 2022-01-03 VITALS
WEIGHT: 209 LBS | SYSTOLIC BLOOD PRESSURE: 140 MMHG | HEIGHT: 68 IN | HEART RATE: 72 BPM | BODY MASS INDEX: 31.67 KG/M2 | DIASTOLIC BLOOD PRESSURE: 76 MMHG | OXYGEN SATURATION: 98 % | TEMPERATURE: 98.4 F

## 2022-01-03 PROCEDURE — 36415 COLL VENOUS BLD VENIPUNCTURE: CPT

## 2022-01-03 PROCEDURE — 99212 OFFICE O/P EST SF 10 MIN: CPT | Mod: 25

## 2022-01-05 ENCOUNTER — NON-APPOINTMENT (OUTPATIENT)
Age: 57
End: 2022-01-05

## 2022-01-05 LAB
ALBUMIN SERPL ELPH-MCNC: 4.3 G/DL
ALP BLD-CCNC: 89 U/L
ALT SERPL-CCNC: 10 U/L
ANION GAP SERPL CALC-SCNC: 14 MMOL/L
AST SERPL-CCNC: 11 U/L
BASOPHILS # BLD AUTO: 0.04 K/UL
BASOPHILS NFR BLD AUTO: 0.6 %
BILIRUB SERPL-MCNC: 0.8 MG/DL
BUN SERPL-MCNC: 24 MG/DL
CALCIUM SERPL-MCNC: 9.6 MG/DL
CHLORIDE SERPL-SCNC: 105 MMOL/L
CO2 SERPL-SCNC: 22 MMOL/L
CREAT SERPL-MCNC: 1.15 MG/DL
CRP SERPL-MCNC: 6 MG/L
EOSINOPHIL # BLD AUTO: 0.24 K/UL
EOSINOPHIL NFR BLD AUTO: 3.3 %
ERYTHROCYTE [SEDIMENTATION RATE] IN BLOOD BY WESTERGREN METHOD: 30 MM/HR
GLUCOSE SERPL-MCNC: 148 MG/DL
HCT VFR BLD CALC: 46.4 %
HGB BLD-MCNC: 15.1 G/DL
IMM GRANULOCYTES NFR BLD AUTO: 0.1 %
LYMPHOCYTES # BLD AUTO: 1.98 K/UL
LYMPHOCYTES NFR BLD AUTO: 27.3 %
MAN DIFF?: NORMAL
MCHC RBC-ENTMCNC: 28.4 PG
MCHC RBC-ENTMCNC: 32.5 GM/DL
MCV RBC AUTO: 87.4 FL
MONOCYTES # BLD AUTO: 0.55 K/UL
MONOCYTES NFR BLD AUTO: 7.6 %
NEUTROPHILS # BLD AUTO: 4.43 K/UL
NEUTROPHILS NFR BLD AUTO: 61.1 %
PLATELET # BLD AUTO: 274 K/UL
POTASSIUM SERPL-SCNC: 4.5 MMOL/L
PROT SERPL-MCNC: 7.1 G/DL
RBC # BLD: 5.31 M/UL
RBC # FLD: 13.8 %
SODIUM SERPL-SCNC: 141 MMOL/L
WBC # FLD AUTO: 7.25 K/UL

## 2022-01-12 LAB
CULTURE RESULTS: SIGNIFICANT CHANGE UP
CULTURE RESULTS: SIGNIFICANT CHANGE UP
SPECIMEN SOURCE: SIGNIFICANT CHANGE UP
SPECIMEN SOURCE: SIGNIFICANT CHANGE UP

## 2022-01-12 NOTE — PHYSICAL EXAM
[General Appearance - Alert] : alert [General Appearance - In No Acute Distress] : in no acute distress [Outer Ear] : the ears and nose were normal in appearance [Oropharynx] : the oropharynx was normal with no thrush [Auscultation Breath Sounds / Voice Sounds] : lungs were clear to auscultation bilaterally [Heart Rate And Rhythm] : heart rate was normal and rhythm regular [Heart Sounds] : normal S1 and S2 [Heart Sounds Gallop] : no gallops [Murmurs] : no murmurs [Heart Sounds Pericardial Friction Rub] : no pericardial rub [Bowel Sounds] : normal bowel sounds [Abdomen Soft] : soft [Abdomen Tenderness] : non-tender [Abdomen Mass (___ Cm)] : no abdominal mass palpated [Costovertebral Angle Tenderness] : no CVA tenderness [No Palpable Adenopathy] : no palpable adenopathy [Skin Color & Pigmentation] : normal skin color and pigmentation [] : no rash [Deep Tendon Reflexes (DTR)] : deep tendon reflexes were 2+ and symmetric [Sensation] : the sensory exam was normal to light touch and pinprick [No Focal Deficits] : no focal deficits [FreeTextEntry1] : rue pic

## 2022-01-12 NOTE — ASSESSMENT
[Treatment Education] : treatment education [Treatment Adherence] : treatment adherence [Nutritional / Food Issues] : nutritional/food issues [Universal Precautions] : universal precautions [Medical Care Issues] : medical care issues [Anticipatory Guidance] : anticipatory guidance [FreeTextEntry1] : Diabetic foot infection/OM on IV abx\par Penicillin allergy\par Uncontrolled DM\par \par - BCX ngtd\par - preop Wound cX e.faecalis, staph aureus and alpha hemolytic strep\par - s/p OR 11/22 s/p partial amp of 1st ray\par - OR cx +enterococcus, finegoldia magna\par -fungal cx of toe abscess rare c.albicans however fungal cx of toe ngtd at 4 weeks-c.albicans may be normal yumiko/contaminant, will not treat specifically as washout has been performed and did not grow in bone\par - path + OM\par - s/p clindamycin x 3 days\par - was on meropenem in hospital discharged with picc on-->ertapenem 1 g IV daily \par - vancomycin 1250mg iv q12h-. end date 12/19/21. (patient has h/o penicillin allergy and hence need for vancomycin)\par completed 6 weeks through 1/2/22\par - awaiting labs from [facility] from last week, will recheck labs today and then likely Dc PICC after\par \par Advised endo f/u for imporved glycemic control needed for wound healing\par c/w podiatry f/u to ensure wound healing and f/u ID as needed\par

## 2022-01-12 NOTE — HISTORY OF PRESENT ILLNESS
[FreeTextEntry1] : admitted to [facility] 11/21/21-11/27/21\par \par 55yoM hx DM with neuropathy, s/p partial 5th amputation on R-foot and partial 1st toe amputation on L-foot, had right foot ulcer for the past 2 months who was advised to go to hospital by outpatient podiatrist for worsening right foot ulcer.  \par at [facility] Labs notable for leukocytosis, hyponatremia, hyperglycemia, elevated inflammatory markers.  CT RLE with phlegmon, soft tissue gas and suspicious for OM. Was admitted for\par \par Diabetic foot infection/OM/gas gangrene\par Penicillin allergy\par Uncontrolled DM\par \par - BCX ngtd\par - preop Wound cX e.faecalis, staph aureus and alpha hemolytic strep\par - s/p OR 11/22 s/p partial amp of 1st ray\par - OR cx +enterococcus, finegoldia magna\par - path + OM\par - s/p clindamycin x 3 days\par - was on meropenem in hospital discharged with picc on-->ertapenem 1 g IV daily end date 12/19/21\par - vancomycin 1250mg iv q12h-. end date 12/19/21. \par - weekly CBC CMP ESr CRP vanco trough\par \par patient here for f/u visit. Feels well. PICC is working well, however reports difficulty with obtaining weekly blood samples and visiting RN has to attempt venipuncture several times. Requesting labs be performed today-was due for RN visit at home today. No fever no chills. Has seen podiatry, wound healing well. has not seen endo yet. Looking for PCP\par \par Interval f/u 1/3/22- pt feels well. wound healing well. rt big toe stump with slight dehiscensce at lateral edge, serous drainage. rue picc intact. received call from [facility] pt with elevated K on labs last week, labs were repeated on 12/31/21, results not available yet. Called [facility], pharmacist not available to discuss, labs will be searched for and faxed when available \par pt saw Dr Jain last thursday and will see him every 2 weeks now\par

## 2022-01-25 LAB
ANION GAP SERPL CALC-SCNC: 11 MMOL/L
BASOPHILS # BLD AUTO: 0.08 K/UL
BASOPHILS NFR BLD AUTO: 0.8 %
BUN SERPL-MCNC: 16 MG/DL
CALCIUM SERPL-MCNC: 9.5 MG/DL
CHLORIDE SERPL-SCNC: 102 MMOL/L
CO2 SERPL-SCNC: 25 MMOL/L
CREAT SERPL-MCNC: 1.07 MG/DL
CRP SERPL-MCNC: 6 MG/L
EOSINOPHIL # BLD AUTO: 0.22 K/UL
EOSINOPHIL NFR BLD AUTO: 2.2 %
ERYTHROCYTE [SEDIMENTATION RATE] IN BLOOD BY WESTERGREN METHOD: 23 MM/HR
GLUCOSE SERPL-MCNC: 180 MG/DL
HCT VFR BLD CALC: 46.2 %
HGB BLD-MCNC: 15 G/DL
IMM GRANULOCYTES NFR BLD AUTO: 0.3 %
LYMPHOCYTES # BLD AUTO: 1.75 K/UL
LYMPHOCYTES NFR BLD AUTO: 17.5 %
MAN DIFF?: NORMAL
MCHC RBC-ENTMCNC: 29 PG
MCHC RBC-ENTMCNC: 32.5 GM/DL
MCV RBC AUTO: 89.2 FL
MONOCYTES # BLD AUTO: 0.56 K/UL
MONOCYTES NFR BLD AUTO: 5.6 %
NEUTROPHILS # BLD AUTO: 7.35 K/UL
NEUTROPHILS NFR BLD AUTO: 73.6 %
PLATELET # BLD AUTO: 282 K/UL
POTASSIUM SERPL-SCNC: 4.6 MMOL/L
RBC # BLD: 5.18 M/UL
RBC # FLD: 13.2 %
SODIUM SERPL-SCNC: 139 MMOL/L
VANCOMYCIN TROUGH SERPL-MCNC: 28.2 UG/ML
WBC # FLD AUTO: 9.99 K/UL

## 2022-02-01 ENCOUNTER — APPOINTMENT (OUTPATIENT)
Dept: ENDOCRINOLOGY | Facility: CLINIC | Age: 57
End: 2022-02-01
Payer: COMMERCIAL

## 2022-02-01 PROCEDURE — 99213 OFFICE O/P EST LOW 20 MIN: CPT | Mod: 95

## 2022-02-01 RX ORDER — CEFTRIAXONE 2 G/1
2 INJECTION, POWDER, FOR SOLUTION INTRAMUSCULAR; INTRAVENOUS
Refills: 0 | Status: DISCONTINUED | COMMUNITY
Start: 2020-03-17 | End: 2022-02-01

## 2022-02-01 RX ORDER — METFORMIN HYDROCHLORIDE 500 MG/1
500 TABLET, COATED ORAL
Qty: 180 | Refills: 1 | Status: DISCONTINUED | COMMUNITY
Start: 1900-01-01 | End: 2022-02-01

## 2022-02-01 RX ORDER — CEFPODOXIME PROXETIL 200 MG/1
200 TABLET, FILM COATED ORAL
Qty: 180 | Refills: 2 | Status: DISCONTINUED | COMMUNITY
Start: 2020-04-21 | End: 2022-02-01

## 2022-02-01 RX ORDER — CLINDAMYCIN HYDROCHLORIDE 300 MG/1
300 CAPSULE ORAL
Qty: 28 | Refills: 0 | Status: DISCONTINUED | COMMUNITY
Start: 2021-11-18

## 2022-02-01 NOTE — HISTORY OF PRESENT ILLNESS
----- Message from Cedric Londono sent at 6/26/2017 11:10 AM CDT -----  Contact: Pt  Pt is requesting to speak with the nurse in regards to the medication that was prescribed for her.  Please advise [phone]     [Home] : at home, [unfilled] , at the time of the visit. [Other Location: e.g. Home (Enter Location, City,State)___] : at [unfilled] [Verbal consent obtained from patient] : the patient, [unfilled] [FreeTextEntry1] : \par Pt has h/o  oseteomyelitis. Hx DM\par with neuropathy, s/p partial 5th amputation on R-foot and partial 1st toe\par amputation on L-foot.\par - DM hx: DM type 2, a1c 12.9% on admission. He reports being diabetic for more\par than 10 years, admits to being non compliant, was on metformin for some time\par which he self d/esvin due to adverse GI effects. Stopped insulin for over a year. Now back on insulin. \par \par Foot has healed was discharged. PICC Line removed \par \par Drinking issue 6 days a week for 6 months, where he was living was like a Frat house in [place], believes this is what caused increase in HgbA1C. Drunk everyday, drinks Liquor, Has not had a drink in 3 months, except 2 beers at a party\par \par Quality: Type 2 DM\par Severity: uncontrolled \par Duration: 8 years ago \par Onset: cellulitis, A1C 11\par controlled with Metformin a low carb diet \par Modifying Factors: Better with medication \par Associated Symptoms: neuropathy. retinopathy \par \par Current Regimen:\par Lantus 18 units at night \par Ademlog 6 units TID AC \par Metformin in past, could not tolerate \par \par Self blood sugar monitoring: 3-4 times a day \par Average in AM 140s\par Before dinner 130-140s\par Denies Hypoglycemic events \par \par Has not been checking due to cost of striips \par  \par HgbA1C: 12.9% \par \par exercise: not able to due to osteomyelitis, now cleared- going to start going back to the gym \par \par Diet: never had a bad diet, cutting out more carbs \par B- eggs, yogurt\par L- salad with chicken\par D- chicken, beans, vegetables\par Snacks - nuts, fruit \par \par Weight: Gained 10 pounds \par \par \par Date of last eye exam: over 2 years ago, - DR \par Date of last foot exam: 2 weeks ago, NV in 2 weeks \par Date of last flu vaccine: no \par Date of last Pneumovax: no \par

## 2022-02-01 NOTE — ASSESSMENT
[Diabetes Foot Care] : diabetes foot care [Long Term Vascular Complications] : long term vascular complications of diabetes [Carbohydrate Consistent Diet] : carbohydrate consistent diet [Importance of Diet and Exercise] : importance of diet and exercise to improve glycemic control, achieve weight loss and improve cardiovascular health [Exercise/Effect on Glucose] : exercise/effect on glucose [Retinopathy Screening] : Patient was referred to ophthalmology for retinopathy screening [FreeTextEntry1] : 57 y/o male has Osteomyelitis of left foot with Type 2 DM. A1C 12.6% \par \par Plan: \par Type 2 DM: uncontrolled\par - Continue Lantus 16 units at HS\par -Humalog 6 units TID AC \par -Not changing doses on insulin due to patient going to start going to gym and eat less carbs \par -+ rotating injection sites\par -Picking up test strips today, will start to check more \par - send in logs weekly until next appointment \par - continue low carb diet \par -exercise as tolerated\par - educated on the importance of annual eye exams r/t retinopathy \par -Follow up with ID and podiatry \par -Needs frequent follow up for tight glucose control\par \par Osteomyelitis of left foot: healed\par \par Fasting Labs before next appt \par \par -RTO in 2 months Dr. Maldonado \par

## 2022-04-14 ENCOUNTER — NON-APPOINTMENT (OUTPATIENT)
Age: 57
End: 2022-04-14

## 2022-04-18 ENCOUNTER — APPOINTMENT (OUTPATIENT)
Dept: ENDOCRINOLOGY | Facility: CLINIC | Age: 57
End: 2022-04-18
Payer: COMMERCIAL

## 2022-04-18 VITALS
OXYGEN SATURATION: 98 % | HEART RATE: 72 BPM | HEIGHT: 68 IN | DIASTOLIC BLOOD PRESSURE: 80 MMHG | TEMPERATURE: 98.2 F | BODY MASS INDEX: 32.13 KG/M2 | WEIGHT: 212 LBS | SYSTOLIC BLOOD PRESSURE: 124 MMHG | RESPIRATION RATE: 16 BRPM

## 2022-04-18 PROCEDURE — 99215 OFFICE O/P EST HI 40 MIN: CPT

## 2022-04-18 NOTE — ASSESSMENT
[Diabetes Foot Care] : diabetes foot care [Long Term Vascular Complications] : long term vascular complications of diabetes [Carbohydrate Consistent Diet] : carbohydrate consistent diet [Importance of Diet and Exercise] : importance of diet and exercise to improve glycemic control, achieve weight loss and improve cardiovascular health [Exercise/Effect on Glucose] : exercise/effect on glucose [Retinopathy Screening] : Patient was referred to ophthalmology for retinopathy screening [FreeTextEntry1] : 55 y/o male has Osteomyelitis of left foot with Type 2 DM. A1C 12.6% \par Current Regimen:\par Basaglar  16 units at night \par Novolog  6 units  bid with breakfast and dinner\par \par will get a1c this visit.\par \par Medication changes:\par To continue basaglar 16 units daily, taker novlog 6 to 8 units tid with meals\par  To check sugars 4 x a day  at different times.\par Bring log to next visit\par Diet and exercise reviewed.\par Hypoglycemia reviewed.\par  Eyes: no  active issues,  retinopathy\par Feet: no active issues, no neuropathy.  Diabetic foot care reviewed.\par  Lipids:  not on statin/ anti-lipid treatment. Last LDL: not known, will check, does not like to take meds\par  Renal/ B/P : B/P wnl ,  not on ACE/ ARB. will check for  proteinuria.  \par Weight:   Overweight  Balanced diet and exercise reviewed.\par \par  Diabetes counselling: \par The patient was counseled on diabetes foot care, long term vascular complications of diabetes, carbohydrate consistent diet, importance of diet and exercise to improve glycemic control, achieve weight loss and improve cardiovascular health and action and use of short and long-acting insulin. Patient was referred to ophthalmology for retinopathy screening. ADA diet (30-50 gm carbohydrates with each meal, 15 grams with snacks. Balance with lean proteins and low fat diet.) Exercise daily per ability, work up to 30 minutes a day. Medication, risks and benefits reviewed. Glucose testing and insulin administration for glycemic management.\par \par \par FOLLOWUP@3 months\par \par

## 2022-04-18 NOTE — PHYSICAL EXAM
[Alert] : alert [Well Nourished] : well nourished [No Acute Distress] : no acute distress [Normal Sclera/Conjunctiva] : normal sclera/conjunctiva [PERRL] : pupils equal, round and reactive to light [No Proptosis] : no proptosis [No Neck Mass] : no neck mass was observed [Thyroid Not Enlarged] : the thyroid was not enlarged [No Thyroid Nodules] : no palpable thyroid nodules [No Respiratory Distress] : no respiratory distress [No Accessory Muscle Use] : no accessory muscle use [Normal Rate and Effort] : normal respiratory rate and effort [Clear to Auscultation] : lungs were clear to auscultation bilaterally [Normal S1, S2] : normal S1 and S2 [No Murmurs] : no murmurs [Normal Rate] : heart rate was normal [Regular Rhythm] : with a regular rhythm [Normal Bowel Sounds] : normal bowel sounds [Not Tender] : non-tender [Not Distended] : not distended [Soft] : abdomen soft [Normal Supraclavicular Nodes] : no supraclavicular lymphadenopathy [Normal Anterior Cervical Nodes] : no anterior cervical lymphadenopathy [Normal Posterior Cervical Nodes] : no posterior cervical lymphadenopathy [Normal Gait] : normal gait [No Clubbing, Cyanosis] : no clubbing  or cyanosis of the fingernails [No Joint Swelling] : no joint swelling seen [No Rash] : no rash [No Skin Lesions] : no skin lesions [Acanthosis Nigricans] : no acanthosis nigricans [Right foot was examined, including] : right foot ~C was examined, including visual inspection with sensory and pulse exams [Left foot was examined, including] : left foot ~C was examined, including visual inspection with sensory and pulse exams [Diminished Throughout Both Feet] : diminished tactile sensation with monofilament testing throughout both feet [No Motor Deficits] : the motor exam was normal [No Tremors] : no tremors [Oriented x3] : oriented to person, place, and time [Normal Affect] : the affect was normal [Normal Insight/Judgement] : insight and judgment were intact [Normal Mood] : the mood was normal [FreeTextEntry1] : 1st and 5th toe amputation [FreeTextEntry2] : dystrophic toe nails [FreeTextEntry5] : callus under gereat toe [FreeTextEntry6] : dystrophic toe nails

## 2022-04-18 NOTE — HISTORY OF PRESENT ILLNESS
[FreeTextEntry1] : Jose is a 56 yr old male, who is here for follow up on DM type 2, seen by my NP .\par Pt has h/o  oseteomyelitis. Hx DM with neuropathy, s/p partial 5th amputation on foot and partial 1st toe\par amputation on R-foot 11/21.\par DM type 2, a1c 12.9% on admission. He reports being diabetic for more\par than 10 years, admits to being non compliant, was on metformin for some time.\par which he self d/esvin due to adverse GI effects. Stopped insulin for over a year. Now back on insulin. \par \par Quality: Type 2 DM\par Severity: uncontrolled \par Duration: 8 years ago \par Onset: cellulitis, A1C 11\par controlled with Metformin a low carb diet \par Modifying Factors: Better with medication \par Associated Symptoms: neuropathy. retinopathy \par \par Current Regimen:\par Basaglar  16 units at night \par Novolog  6 units  bid with breakfast and dinner\par Metformin in past, could not tolerate \par \par Self blood sugar monitoring: 3-4 times a day \par Average in AM 110s\par Before dinner 130-140s occasional 160s. some 200s\par Denies Hypoglycemic events \par \par  \par Diet: never had a bad diet, cutting out more carbs \par B- eggs, yogurt\par L- salad with chicken\par D- chicken, beans, vegetables\par Snacks - nuts, fruit \par \par Weight: Gained 10 pounds \par \par \par Date of last eye exam: over 2 years ago, - DR \par Date of last foot exam: 2 weeks ago, NV in 2 weeks \par Date of last flu vaccine: no \par Date of last Pneumovax: no \par \par Drinking issue 6 days a week for 6 months, where he was living was like a Frat house in [place], believes this is what caused increase in HgbA1C. Drunk everyday, drinks Liquor, Has not had a drink in 3 months, except 2 beers at a party

## 2022-04-21 ENCOUNTER — NON-APPOINTMENT (OUTPATIENT)
Age: 57
End: 2022-04-21

## 2022-04-21 LAB
ANION GAP SERPL CALC-SCNC: 11 MMOL/L
BUN SERPL-MCNC: 23 MG/DL
CALCIUM SERPL-MCNC: 9.3 MG/DL
CHLORIDE SERPL-SCNC: 103 MMOL/L
CHOLEST SERPL-MCNC: 171 MG/DL
CO2 SERPL-SCNC: 23 MMOL/L
CREAT SERPL-MCNC: 1.11 MG/DL
CREAT SPEC-SCNC: 86 MG/DL
EGFR: 78 ML/MIN/1.73M2
ESTIMATED AVERAGE GLUCOSE: 235 MG/DL
GLUCOSE SERPL-MCNC: 213 MG/DL
HBA1C MFR BLD HPLC: 9.8 %
HDLC SERPL-MCNC: 38 MG/DL
LDLC SERPL CALC-MCNC: 99 MG/DL
MICROALBUMIN 24H UR DL<=1MG/L-MCNC: 3.8 MG/DL
MICROALBUMIN/CREAT 24H UR-RTO: 45 MG/G
NONHDLC SERPL-MCNC: 132 MG/DL
POTASSIUM SERPL-SCNC: 4.6 MMOL/L
SODIUM SERPL-SCNC: 137 MMOL/L
TRIGL SERPL-MCNC: 166 MG/DL

## 2022-05-16 ENCOUNTER — NON-APPOINTMENT (OUTPATIENT)
Age: 57
End: 2022-05-16

## 2022-07-17 ENCOUNTER — INPATIENT (INPATIENT)
Facility: HOSPITAL | Age: 57
LOS: 4 days | Discharge: HOME | DRG: 617 | End: 2022-07-22
Attending: INTERNAL MEDICINE | Admitting: HOSPITALIST
Payer: COMMERCIAL

## 2022-07-17 VITALS
HEART RATE: 85 BPM | SYSTOLIC BLOOD PRESSURE: 122 MMHG | HEIGHT: 68 IN | OXYGEN SATURATION: 98 % | TEMPERATURE: 99 F | WEIGHT: 210.1 LBS | RESPIRATION RATE: 18 BRPM | DIASTOLIC BLOOD PRESSURE: 72 MMHG

## 2022-07-17 DIAGNOSIS — Z89.429 ACQUIRED ABSENCE OF OTHER TOE(S), UNSPECIFIED SIDE: Chronic | ICD-10-CM

## 2022-07-17 DIAGNOSIS — E11.9 TYPE 2 DIABETES MELLITUS WITHOUT COMPLICATIONS: ICD-10-CM

## 2022-07-17 LAB
ALBUMIN SERPL ELPH-MCNC: 3.8 G/DL — SIGNIFICANT CHANGE UP (ref 3.3–5.2)
ALP SERPL-CCNC: 79 U/L — SIGNIFICANT CHANGE UP (ref 40–120)
ALT FLD-CCNC: 10 U/L — SIGNIFICANT CHANGE UP
ANION GAP SERPL CALC-SCNC: 13 MMOL/L — SIGNIFICANT CHANGE UP (ref 5–17)
APPEARANCE UR: CLEAR — SIGNIFICANT CHANGE UP
APTT BLD: 26.8 SEC — LOW (ref 27.5–35.5)
AST SERPL-CCNC: 11 U/L — SIGNIFICANT CHANGE UP
BACTERIA # UR AUTO: ABNORMAL
BASOPHILS # BLD AUTO: 0.04 K/UL — SIGNIFICANT CHANGE UP (ref 0–0.2)
BASOPHILS NFR BLD AUTO: 0.3 % — SIGNIFICANT CHANGE UP (ref 0–2)
BILIRUB SERPL-MCNC: 0.8 MG/DL — SIGNIFICANT CHANGE UP (ref 0.4–2)
BILIRUB UR-MCNC: NEGATIVE — SIGNIFICANT CHANGE UP
BUN SERPL-MCNC: 18.9 MG/DL — SIGNIFICANT CHANGE UP (ref 8–20)
CALCIUM SERPL-MCNC: 9.1 MG/DL — SIGNIFICANT CHANGE UP (ref 8.6–10.2)
CHLORIDE SERPL-SCNC: 101 MMOL/L — SIGNIFICANT CHANGE UP (ref 98–107)
CO2 SERPL-SCNC: 21 MMOL/L — LOW (ref 22–29)
COLOR SPEC: YELLOW — SIGNIFICANT CHANGE UP
CREAT SERPL-MCNC: 1.23 MG/DL — SIGNIFICANT CHANGE UP (ref 0.5–1.3)
CRP SERPL-MCNC: 204 MG/L — HIGH
DIFF PNL FLD: ABNORMAL
EGFR: 69 ML/MIN/1.73M2 — SIGNIFICANT CHANGE UP
EOSINOPHIL # BLD AUTO: 0.05 K/UL — SIGNIFICANT CHANGE UP (ref 0–0.5)
EOSINOPHIL NFR BLD AUTO: 0.4 % — SIGNIFICANT CHANGE UP (ref 0–6)
EPI CELLS # UR: SIGNIFICANT CHANGE UP
ERYTHROCYTE [SEDIMENTATION RATE] IN BLOOD: 48 MM/HR — HIGH (ref 0–20)
GLUCOSE BLDC GLUCOMTR-MCNC: 210 MG/DL — HIGH (ref 70–99)
GLUCOSE BLDC GLUCOMTR-MCNC: 223 MG/DL — HIGH (ref 70–99)
GLUCOSE SERPL-MCNC: 288 MG/DL — HIGH (ref 70–99)
GLUCOSE UR QL: 1000 MG/DL
HCT VFR BLD CALC: 42.2 % — SIGNIFICANT CHANGE UP (ref 39–50)
HGB BLD-MCNC: 14.4 G/DL — SIGNIFICANT CHANGE UP (ref 13–17)
IMM GRANULOCYTES NFR BLD AUTO: 0.4 % — SIGNIFICANT CHANGE UP (ref 0–1.5)
INR BLD: 1.13 RATIO — SIGNIFICANT CHANGE UP (ref 0.88–1.16)
KETONES UR-MCNC: ABNORMAL
LACTATE BLDV-MCNC: 2.5 MMOL/L — HIGH (ref 0.5–2)
LACTATE SERPL-SCNC: 1.4 MMOL/L — SIGNIFICANT CHANGE UP (ref 0.5–2)
LEUKOCYTE ESTERASE UR-ACNC: NEGATIVE — SIGNIFICANT CHANGE UP
LYMPHOCYTES # BLD AUTO: 1.44 K/UL — SIGNIFICANT CHANGE UP (ref 1–3.3)
LYMPHOCYTES # BLD AUTO: 10.4 % — LOW (ref 13–44)
MAGNESIUM SERPL-MCNC: 1.9 MG/DL — SIGNIFICANT CHANGE UP (ref 1.6–2.6)
MCHC RBC-ENTMCNC: 30.8 PG — SIGNIFICANT CHANGE UP (ref 27–34)
MCHC RBC-ENTMCNC: 34.1 GM/DL — SIGNIFICANT CHANGE UP (ref 32–36)
MCV RBC AUTO: 90.4 FL — SIGNIFICANT CHANGE UP (ref 80–100)
MONOCYTES # BLD AUTO: 0.81 K/UL — SIGNIFICANT CHANGE UP (ref 0–0.9)
MONOCYTES NFR BLD AUTO: 5.8 % — SIGNIFICANT CHANGE UP (ref 2–14)
NEUTROPHILS # BLD AUTO: 11.49 K/UL — HIGH (ref 1.8–7.4)
NEUTROPHILS NFR BLD AUTO: 82.7 % — HIGH (ref 43–77)
NITRITE UR-MCNC: NEGATIVE — SIGNIFICANT CHANGE UP
PH UR: 6 — SIGNIFICANT CHANGE UP (ref 5–8)
PLATELET # BLD AUTO: 178 K/UL — SIGNIFICANT CHANGE UP (ref 150–400)
POTASSIUM SERPL-MCNC: 4.6 MMOL/L — SIGNIFICANT CHANGE UP (ref 3.5–5.3)
POTASSIUM SERPL-SCNC: 4.6 MMOL/L — SIGNIFICANT CHANGE UP (ref 3.5–5.3)
PROT SERPL-MCNC: 7.4 G/DL — SIGNIFICANT CHANGE UP (ref 6.6–8.7)
PROT UR-MCNC: 30 MG/DL
PROTHROM AB SERPL-ACNC: 13.1 SEC — SIGNIFICANT CHANGE UP (ref 10.5–13.4)
RAPID RVP RESULT: SIGNIFICANT CHANGE UP
RBC # BLD: 4.67 M/UL — SIGNIFICANT CHANGE UP (ref 4.2–5.8)
RBC # FLD: 12.2 % — SIGNIFICANT CHANGE UP (ref 10.3–14.5)
RBC CASTS # UR COMP ASSIST: SIGNIFICANT CHANGE UP /HPF (ref 0–4)
SARS-COV-2 RNA SPEC QL NAA+PROBE: SIGNIFICANT CHANGE UP
SODIUM SERPL-SCNC: 135 MMOL/L — SIGNIFICANT CHANGE UP (ref 135–145)
SP GR SPEC: 1.02 — SIGNIFICANT CHANGE UP (ref 1.01–1.02)
UROBILINOGEN FLD QL: NEGATIVE MG/DL — SIGNIFICANT CHANGE UP
WBC # BLD: 13.89 K/UL — HIGH (ref 3.8–10.5)
WBC # FLD AUTO: 13.89 K/UL — HIGH (ref 3.8–10.5)
WBC UR QL: SIGNIFICANT CHANGE UP /HPF (ref 0–5)

## 2022-07-17 PROCEDURE — 99233 SBSQ HOSP IP/OBS HIGH 50: CPT

## 2022-07-17 PROCEDURE — 99285 EMERGENCY DEPT VISIT HI MDM: CPT

## 2022-07-17 PROCEDURE — 73630 X-RAY EXAM OF FOOT: CPT | Mod: 26,RT

## 2022-07-17 PROCEDURE — 93010 ELECTROCARDIOGRAM REPORT: CPT

## 2022-07-17 PROCEDURE — 71045 X-RAY EXAM CHEST 1 VIEW: CPT | Mod: 26

## 2022-07-17 RX ORDER — DEXTROSE 50 % IN WATER 50 %
25 SYRINGE (ML) INTRAVENOUS ONCE
Refills: 0 | Status: DISCONTINUED | OUTPATIENT
Start: 2022-07-17 | End: 2022-07-20

## 2022-07-17 RX ORDER — GLUCAGON INJECTION, SOLUTION 0.5 MG/.1ML
1 INJECTION, SOLUTION SUBCUTANEOUS ONCE
Refills: 0 | Status: DISCONTINUED | OUTPATIENT
Start: 2022-07-17 | End: 2022-07-20

## 2022-07-17 RX ORDER — SODIUM CHLORIDE 9 MG/ML
1000 INJECTION, SOLUTION INTRAVENOUS
Refills: 0 | Status: DISCONTINUED | OUTPATIENT
Start: 2022-07-17 | End: 2022-07-20

## 2022-07-17 RX ORDER — OXYCODONE AND ACETAMINOPHEN 5; 325 MG/1; MG/1
1 TABLET ORAL EVERY 6 HOURS
Refills: 0 | Status: DISCONTINUED | OUTPATIENT
Start: 2022-07-17 | End: 2022-07-20

## 2022-07-17 RX ORDER — MEROPENEM 1 G/30ML
1000 INJECTION INTRAVENOUS EVERY 8 HOURS
Refills: 0 | Status: DISCONTINUED | OUTPATIENT
Start: 2022-07-17 | End: 2022-07-20

## 2022-07-17 RX ORDER — SODIUM CHLORIDE 9 MG/ML
2100 INJECTION, SOLUTION INTRAVENOUS ONCE
Refills: 0 | Status: COMPLETED | OUTPATIENT
Start: 2022-07-17 | End: 2022-07-17

## 2022-07-17 RX ORDER — DEXTROSE 50 % IN WATER 50 %
15 SYRINGE (ML) INTRAVENOUS ONCE
Refills: 0 | Status: DISCONTINUED | OUTPATIENT
Start: 2022-07-17 | End: 2022-07-20

## 2022-07-17 RX ORDER — INSULIN LISPRO 100/ML
VIAL (ML) SUBCUTANEOUS
Refills: 0 | Status: DISCONTINUED | OUTPATIENT
Start: 2022-07-17 | End: 2022-07-20

## 2022-07-17 RX ORDER — VANCOMYCIN HCL 1 G
1000 VIAL (EA) INTRAVENOUS ONCE
Refills: 0 | Status: DISCONTINUED | OUTPATIENT
Start: 2022-07-17 | End: 2022-07-18

## 2022-07-17 RX ORDER — VANCOMYCIN HCL 1 G
1000 VIAL (EA) INTRAVENOUS ONCE
Refills: 0 | Status: COMPLETED | OUTPATIENT
Start: 2022-07-17 | End: 2022-07-17

## 2022-07-17 RX ORDER — ACETAMINOPHEN 500 MG
650 TABLET ORAL EVERY 6 HOURS
Refills: 0 | Status: DISCONTINUED | OUTPATIENT
Start: 2022-07-17 | End: 2022-07-20

## 2022-07-17 RX ORDER — DEXTROSE 50 % IN WATER 50 %
12.5 SYRINGE (ML) INTRAVENOUS ONCE
Refills: 0 | Status: DISCONTINUED | OUTPATIENT
Start: 2022-07-17 | End: 2022-07-20

## 2022-07-17 RX ORDER — MEROPENEM 1 G/30ML
1000 INJECTION INTRAVENOUS ONCE
Refills: 0 | Status: COMPLETED | OUTPATIENT
Start: 2022-07-17 | End: 2022-07-17

## 2022-07-17 RX ADMIN — Medication 650 MILLIGRAM(S): at 22:11

## 2022-07-17 RX ADMIN — MEROPENEM 100 MILLIGRAM(S): 1 INJECTION INTRAVENOUS at 21:33

## 2022-07-17 RX ADMIN — Medication 4: at 18:26

## 2022-07-17 RX ADMIN — Medication 250 MILLIGRAM(S): at 13:15

## 2022-07-17 RX ADMIN — MEROPENEM 100 MILLIGRAM(S): 1 INJECTION INTRAVENOUS at 15:00

## 2022-07-17 RX ADMIN — SODIUM CHLORIDE 2100 MILLILITER(S): 9 INJECTION, SOLUTION INTRAVENOUS at 14:30

## 2022-07-17 NOTE — ED PROVIDER NOTE - PRINCIPAL DIAGNOSIS
Continue: prednisolone acetate (prednisolone acetate): drops,suspension: 1% 1 drop four times a day into affected eye 09- Diabetic foot infection

## 2022-07-17 NOTE — H&P ADULT - NSHPPHYSICALEXAM_GEN_ALL_CORE
GENERAL: NAD,   HEAD:  Atraumatic, Normocephalic  EYES: EOMI, PERRL, conjunctiva and sclera clear  ENMT: No tonsillar erythema, exudates, or enlargement; Moist mucous membranes,   NECK: Supple, No JVD, Normal thyroid  NERVOUS SYSTEM:  Alert & Oriented X3, Moves B/L upper and lower extremities;   CHEST/LUNG: Clear to percussion bilaterally; No rales, rhonchi, wheezing, or rubs  HEART: Regular rate and rhythm; No murmurs, rubs, or gallops  ABDOMEN: Soft, Nontender, Nondistended; Bowel sounds present  EXTREMITIES:  2+ edema, foot swollen red skin flap with small opening  LYMPH: No lymphadenopathy noted  SKIN: No rashes or lesions

## 2022-07-17 NOTE — ED ADULT TRIAGE NOTE - CHIEF COMPLAINT QUOTE
patient with infection to right foot sent in by md for iv abx. fever at home last night highest 102.9

## 2022-07-17 NOTE — H&P ADULT - ASSESSMENT
56 year old male with medical history significant for IDDM and prior diabetic foot ulcers needing toe amputations in the past. also had fever 102 on Friday denied any chills   56 year old male with medical history significant for IDDM and prior diabetic foot ulcers needing toe amputations in the past. also had fever 102 on Friday denied any chills  Foot toe flap cellulitis infection- possible OM0 start Merrem and Vanco for broad spectrum coverage - can deescalate antibiotics with clinical improvement - podiatry following   Rule out osteo- follow imaging MRI   DVT PPX- Lovenox  DM- FS with coverage AC and HS   Cont long acting coverage with Lantus

## 2022-07-17 NOTE — ED PROVIDER NOTE - OBJECTIVE STATEMENT
55yo male with pmh of IDDM, diabetic foot ulcer and amputation presents with right foot infection. Pt states for the past week with redness, pain, discharge from right foot. Pt followed by podiatry and instructed to come to ED for IV abx. Pt also reports fevers for the past 3 days, Tmax 102. Pt last took ibuprofen at 5am. Pt with dry cough for 2-3 days. Pt is not vaccinated for COVID or Flu. Pt denies ha, loc, focal neuro deficits, cp/sob/palp, cough, abd pain/n/v/d, urinary symptoms, recent travel and sick contacts.

## 2022-07-17 NOTE — ED PROVIDER NOTE - CLINICAL SUMMARY MEDICAL DECISION MAKING FREE TEXT BOX
57yo male with pmh of IDDM, diabetic foot ulcer and amputation presents with right foot infection. 57yo male with pmh of IDDM, diabetic foot ulcer and amputation presents with right foot infection. Pt with diabetic foot infection, broad spectrum abx started, podiatry called will follow, pt also COVID rule out at this time, admit for further management

## 2022-07-17 NOTE — ED PROVIDER NOTE - PHYSICAL EXAMINATION
Const: Awake, alert and oriented. In no acute distress. Well appearing.  HEENT: NC/AT. Moist mucous membranes.  Eyes: No scleral icterus. EOMI.  Neck:. Soft and supple. Full ROM without pain.  Cardiac: Regular rate and regular rhythm. +S1/S2. Peripheral pulses 2+ and symmetric. No LLE edema  Resp: Speaking in full sentences. No evidence of respiratory distress. No wheezes, rales or rhonchi.  Abd: Soft, non-tender, non-distended. Normal bowel sounds in all 4 quadrants. No guarding or rebound.  Back: Spine midline and non-tender. No CVAT.  Skin: RLE +edema, erythema, warmth from foot to mid tib/fib, serosang discharge from fifth toe stump and ulcer at plantar surface no discharge  Lymph: No cervical lymphadenopathy.  Neuro: Awake, alert & oriented x 3. Moves all extremities symmetrically.

## 2022-07-17 NOTE — H&P ADULT - HISTORY OF PRESENT ILLNESS
56 year old male with medical history significant for IDDM and prior diabetic foot ulcers needing toe amputations in the past. also had fever 102 on Friday denied any chills , denied cp or sob, but does have some cough and has not been vaccinated against covid. foot wound ulcer not preceded by any injury or bug bite , his prior toe amputations were years ago- suddenly the skin flap sort of opened and got red and swollen and some discharge he is followed by Dr Jain by  podiatry   no dysuria mp nauseas no vomiting no diarrhea

## 2022-07-18 LAB
A1C WITH ESTIMATED AVERAGE GLUCOSE RESULT: 9.4 % — HIGH (ref 4–5.6)
ANION GAP SERPL CALC-SCNC: 11 MMOL/L — SIGNIFICANT CHANGE UP (ref 5–17)
BUN SERPL-MCNC: 13.9 MG/DL — SIGNIFICANT CHANGE UP (ref 8–20)
CALCIUM SERPL-MCNC: 8.5 MG/DL — LOW (ref 8.6–10.2)
CHLORIDE SERPL-SCNC: 103 MMOL/L — SIGNIFICANT CHANGE UP (ref 98–107)
CO2 SERPL-SCNC: 20 MMOL/L — LOW (ref 22–29)
CREAT SERPL-MCNC: 0.96 MG/DL — SIGNIFICANT CHANGE UP (ref 0.5–1.3)
EGFR: 93 ML/MIN/1.73M2 — SIGNIFICANT CHANGE UP
ESTIMATED AVERAGE GLUCOSE: 223 MG/DL — HIGH (ref 68–114)
GLUCOSE BLDC GLUCOMTR-MCNC: 162 MG/DL — HIGH (ref 70–99)
GLUCOSE BLDC GLUCOMTR-MCNC: 170 MG/DL — HIGH (ref 70–99)
GLUCOSE BLDC GLUCOMTR-MCNC: 177 MG/DL — HIGH (ref 70–99)
GLUCOSE BLDC GLUCOMTR-MCNC: 236 MG/DL — HIGH (ref 70–99)
GLUCOSE SERPL-MCNC: 195 MG/DL — HIGH (ref 70–99)
HCT VFR BLD CALC: 38.5 % — LOW (ref 39–50)
HGB BLD-MCNC: 13.3 G/DL — SIGNIFICANT CHANGE UP (ref 13–17)
MCHC RBC-ENTMCNC: 30.2 PG — SIGNIFICANT CHANGE UP (ref 27–34)
MCHC RBC-ENTMCNC: 34.5 GM/DL — SIGNIFICANT CHANGE UP (ref 32–36)
MCV RBC AUTO: 87.5 FL — SIGNIFICANT CHANGE UP (ref 80–100)
PLATELET # BLD AUTO: 188 K/UL — SIGNIFICANT CHANGE UP (ref 150–400)
POTASSIUM SERPL-MCNC: 4.3 MMOL/L — SIGNIFICANT CHANGE UP (ref 3.5–5.3)
POTASSIUM SERPL-SCNC: 4.3 MMOL/L — SIGNIFICANT CHANGE UP (ref 3.5–5.3)
PROCALCITONIN SERPL-MCNC: 0.23 NG/ML — HIGH (ref 0.02–0.1)
RBC # BLD: 4.4 M/UL — SIGNIFICANT CHANGE UP (ref 4.2–5.8)
RBC # FLD: 12.2 % — SIGNIFICANT CHANGE UP (ref 10.3–14.5)
SODIUM SERPL-SCNC: 134 MMOL/L — LOW (ref 135–145)
WBC # BLD: 10.65 K/UL — HIGH (ref 3.8–10.5)
WBC # FLD AUTO: 10.65 K/UL — HIGH (ref 3.8–10.5)

## 2022-07-18 PROCEDURE — 99233 SBSQ HOSP IP/OBS HIGH 50: CPT

## 2022-07-18 PROCEDURE — 99223 1ST HOSP IP/OBS HIGH 75: CPT

## 2022-07-18 PROCEDURE — 93970 EXTREMITY STUDY: CPT | Mod: 26

## 2022-07-18 RX ORDER — SODIUM CHLORIDE 9 MG/ML
1000 INJECTION INTRAMUSCULAR; INTRAVENOUS; SUBCUTANEOUS
Refills: 0 | Status: DISCONTINUED | OUTPATIENT
Start: 2022-07-18 | End: 2022-07-20

## 2022-07-18 RX ORDER — ENOXAPARIN SODIUM 100 MG/ML
40 INJECTION SUBCUTANEOUS EVERY 24 HOURS
Refills: 0 | Status: DISCONTINUED | OUTPATIENT
Start: 2022-07-18 | End: 2022-07-20

## 2022-07-18 RX ORDER — VANCOMYCIN HCL 1 G
1250 VIAL (EA) INTRAVENOUS EVERY 12 HOURS
Refills: 0 | Status: DISCONTINUED | OUTPATIENT
Start: 2022-07-18 | End: 2022-07-20

## 2022-07-18 RX ORDER — ENOXAPARIN SODIUM 100 MG/ML
40 INJECTION SUBCUTANEOUS EVERY 24 HOURS
Refills: 0 | Status: DISCONTINUED | OUTPATIENT
Start: 2022-07-18 | End: 2022-07-18

## 2022-07-18 RX ADMIN — Medication 4: at 11:54

## 2022-07-18 RX ADMIN — ENOXAPARIN SODIUM 40 MILLIGRAM(S): 100 INJECTION SUBCUTANEOUS at 08:13

## 2022-07-18 RX ADMIN — MEROPENEM 100 MILLIGRAM(S): 1 INJECTION INTRAVENOUS at 14:24

## 2022-07-18 RX ADMIN — MEROPENEM 100 MILLIGRAM(S): 1 INJECTION INTRAVENOUS at 05:44

## 2022-07-18 RX ADMIN — Medication 166.67 MILLIGRAM(S): at 18:41

## 2022-07-18 RX ADMIN — MEROPENEM 100 MILLIGRAM(S): 1 INJECTION INTRAVENOUS at 23:52

## 2022-07-18 RX ADMIN — Medication 2: at 08:12

## 2022-07-18 RX ADMIN — Medication 2: at 16:39

## 2022-07-18 NOTE — CONSULT NOTE ADULT - SUBJECTIVE AND OBJECTIVE BOX
INFECTIOUS DISEASES AND INTERNAL MEDICINE at [place]  =======================================================  Siva Reynolds MD  Diplomates American Board of Internal Medicine and Infectious Diseases  Telephone [phone]  Fax            [phone]  =======================================================    ZEINAB [id number]      HPI:  56 year old male with medical history significant for IDDM and prior diabetic foot ulcers needing toe amputations in the past. also had fever 102 on Friday denied any chills , denied cp or sob, but does have some cough and has not been vaccinated against covid. foot wound ulcer not preceded by any injury or bug bite , his prior toe amputations were years ago- suddenly the skin flap sort of opened and got red and swollen and some discharge he is followed by Dr Jain by  podiatry   no dysuria mp nauseas no vomiting no diarrhea     AS ABOVE PT   WITH ULCER ON BASE OF FOOT  WITH DRAINAGE  ASKED TO EVALUATE FROM ID STANDPOINT       PAST MEDICAL & SURGICAL HISTORY:  Diabetes mellitus      H/O diabetic neuropathy      History of partial amputation of toe          ANTIBIOTICS  meropenem  IVPB 1000 milliGRAM(s) IV Intermittent every 8 hours  vancomycin  IVPB 1000 milliGRAM(s) IV Intermittent once      Allergies    penicillin (Anaphylaxis)  sulfa drugs (Other)    Intolerances    Keflex (Other)      SOCIAL HISTORY:     FAMILY HX   FAMILY HISTORY:  FHx: diabetes mellitus (Grandparent)        Vital Signs Last 24 Hrs  T(C): 36.9 (2022 08:29), Max: 38.6 (2022 21:03)  T(F): 98.5 (2022 08:29), Max: 101.4 (2022 21:03)  HR: 72 (2022 08:29) (68 - 90)  BP: 134/70 (2022 08:29) (104/62 - 134/70)  BP(mean): --  RR: 18 (2022 08:29) (17 - 18)  SpO2: 94% (:) (94% - 99%)    Parameters below as of 2022 08:29  Patient On (Oxygen Delivery Method): room air      Drug Dosing Weight  Height (cm): 172.7 (2022 12:15)  Weight (kg): 95.3 (2022 12:15)  BMI (kg/m2): 32 (2022 12:15)  BSA (m2): 2.09 (2022 12:15)      REVIEW OF SYSTEMS:    CONSTITUTIONAL:  As per HPI.    HEENT:  Eyes:  No diplopia or blurred vision. ENT:  No earache, sore throat or runny nose.    CARDIOVASCULAR:  No pressure, squeezing, strangling, tightness, heaviness or aching about the chest, neck, axilla or epigastrium.    RESPIRATORY:  No cough, shortness of breath, PND or orthopnea.    GASTROINTESTINAL:  No nausea, vomiting or diarrhea.    GENITOURINARY:  No dysuria, frequency or urgency.    MUSCULOSKELETAL:  As per HPI.    SKIN:   AS PER HPI     NEUROLOGIC:  No paresthesias, fasciculations, seizures or weakness.                  PHYSICAL EXAMINATION:    GENERAL: The patient is a _____in no apparent distress. ___     VITAL SIGNS: T(C): 36.9 (22 @ 08:29), Max: 38.6 (22 @ 21:03)  HR: 72 (22 @ 08:29) (68 - 90)  BP: 134/70 (22 @ 08:29) (104/62 - 134/70)  RR: 18 (22 @ 08:29) (17 - 18)  SpO2: 94% (22 @ 08:29) (94% - 99%)  Wt(kg): --    HEENT: Head is normocephalic and atraumatic.  ANICTERIC  NECK: Supple. No carotid bruits.  No lymphadenopathy or thyromegaly.    LUNGS:COARSE BREATH SOUNDS    HEART: Regular rate and rhythm without murmur.    ABDOMEN: Soft, nontender, and nondistended.  Positive bowel sounds.  No hepatosplenomegaly was noted. NO REBOUND NO GUARDING    EXTREMITIES: R FOOT WITH ULCER ON PLANTAR SURFACE NPO ODOR SOME Drainage  DORSUM OLD SKIN GRAFT WITH SURROUNDING EDEMA NAD ERYTHEMA  S/PAMP 1ST AND HT TOES     NEUROLOGIC: NON FOCAL               BLOOD CULTURES       URINE CX          LABS:                        13.3   10.65 )-----------( 188      ( 2022 08:15 )             38.5     07-18    134<L>  |  103  |  13.9  ----------------------------<  195<H>  4.3   |  20.0<L>  |  0.96    Ca    8.5<L>      2022 08:14  Mg     1.9     -    TPro  7.4  /  Alb  3.8  /  TBili  0.8  /  DBili  x   /  AST  11  /  ALT  10  /  AlkPhos  79  07-    PT/INR - ( 2022 13:49 )   PT: 13.1 sec;   INR: 1.13 ratio         PTT - ( 2022 13:49 )  PTT:26.8 sec  Urinalysis Basic - ( 2022 14:56 )    Color: Yellow / Appearance: Clear / S.020 / pH: x  Gluc: x / Ketone: Trace  / Bili: Negative / Urobili: Negative mg/dL   Blood: x / Protein: 30 mg/dL / Nitrite: Negative   Leuk Esterase: Negative / RBC: 0-2 /HPF / WBC 0-2 /HPF   Sq Epi: x / Non Sq Epi: Occasional / Bacteria: Occasional        RADIOLOGY & ADDITIONAL STUDIES:      ASSESSMENT/PLAN  56 year old male with medical history significant for IDDM and prior diabetic foot ulcers needing toe amputations in the past. also had fever 102 on Friday denied any chills , denied cp or sob, but does have some cough and has not been vaccinated against covid. foot wound ulcer not preceded by any injury or bug bite , his prior toe amputations were years ago- suddenly the skin flap sort of opened and got red and swollen and some discharge he is followed by Dr Jain by  podiatry   no dysuria mp nauseas no vomiting no diarrhea     AS ABOVE PT   WITH ULCER ON BASE OF FOOT  WITH DRAINAGE  PT PLACED ON IV ABX  BLOOD CX X2 SETS DONE   ON MERREM VANCO  HAD PREVIOUS INFECTION  WITH AMPUTATION 2021  WAS ON INVANZ/VANCO IN PAST AT HOME  PLAN IV ABX   MRI WILL FOLLOW UP              RUBIA JOSÉ MD
Patient is a 56y old  Male who presents with a chief complaint of diabetic foot infection (17 Jul 2022 16:23)    Podiatry HPI: 57 y/o male with a PMHx as noted below. Patient states that he noticed the wound yesterday and states that he it got red and swollen since friday. Denies any trauma. Denies any pain. States that he is followed by his podiatrist, Dr. Jain. Admits to nausea but denies any v/f/c/sob/cp.     HPI:  56 year old male with medical history significant for IDDM and prior diabetic foot ulcers needing toe amputations in the past. also had fever 102 on Friday denied any chills , denied cp or sob, but does have some cough and has not been vaccinated against covid. foot wound ulcer not preceded by any injury or bug bite , his prior toe amputations were years ago- suddenly the skin flap sort of opened and got red and swollen and some discharge he is followed by Dr Jain by  podiatry   no dysuria mp nauseas no vomiting no diarrhea  (17 Jul 2022 16:23)    PMH:Diabetes mellitus    H/O diabetic neuropathy      Allergies: Keflex (Other)  penicillin (Anaphylaxis)  sulfa drugs (Other)    Medications: dextrose 5%. 1000 milliLiter(s) IV Continuous <Continuous>  dextrose 5%. 1000 milliLiter(s) IV Continuous <Continuous>  dextrose 50% Injectable 25 Gram(s) IV Push once  dextrose 50% Injectable 12.5 Gram(s) IV Push once  dextrose 50% Injectable 25 Gram(s) IV Push once  dextrose Oral Gel 15 Gram(s) Oral once PRN  glucagon  Injectable 1 milliGRAM(s) IntraMuscular once  insulin lispro (ADMELOG) corrective regimen sliding scale   SubCutaneous three times a day before meals  meropenem  IVPB 1000 milliGRAM(s) IV Intermittent every 8 hours  oxycodone    5 mG/acetaminophen 325 mG 1 Tablet(s) Oral every 6 hours PRN  vancomycin  IVPB 1000 milliGRAM(s) IV Intermittent once    FH:FHx: diabetes mellitus (Grandparent)      PSX: History of partial amputation of toe      SH: Social History:  no smoking no alcoholism (17 Jul 2022 16:23)      Vital Signs Last 24 Hrs  T(C): 37.2 (17 Jul 2022 14:30), Max: 37.8 (17 Jul 2022 13:30)  T(F): 99 (17 Jul 2022 14:30), Max: 100 (17 Jul 2022 13:30)  HR: 88 (17 Jul 2022 14:30) (85 - 90)  BP: 132/76 (17 Jul 2022 14:30) (122/72 - 132/76)  BP(mean): --  RR: 18 (17 Jul 2022 14:30) (18 - 18)  SpO2: 99% (17 Jul 2022 14:30) (98% - 99%)    Parameters below as of 17 Jul 2022 12:15  Patient On (Oxygen Delivery Method): room air        LABS                        14.4   13.89 )-----------( 178      ( 17 Jul 2022 13:49 )             42.2               07-17    135  |  101  |  18.9  ----------------------------<  288<H>  4.6   |  21.0<L>  |  1.23    Ca    9.1      17 Jul 2022 13:49  Mg     1.9     07-17    TPro  7.4  /  Alb  3.8  /  TBili  0.8  /  DBili  x   /  AST  11  /  ALT  10  /  AlkPhos  79  07-17  WBC Count: 13.89 K/uL (07-17-22 @ 13:49)  PHYSICAL EXAM  GEN: ZEINAB SCHNEIDER is a pleasant well-nourished, well developed 56y Male in no acute distress, alert awake, and oriented to person, place and time.   Right LE Focused:    Vasc:  DP/PT pulses were non-palpable due to edema. Skin temperature noted to be warm to warm from the tibial tuberosity to the distal aspect of the right foot.  Derm:  Right sub 4th metatarsal head wound, increased redness and warmth, no malodor noted, no purulent drainage noted, tunnelling noted.  Neuro: Protective sensation absent to the Right.  MSK: No pain on palpation to the right foot. Pain to the right calf upon palpation.        Imaging:   Pending right xrays final read    A:   Right sub 4th metatarsal head wound    P:  Patient evaluated, chart reviewed  Xrays final read pending  TONI/PVR pending  Ordered a Right Duplex to rule out any DVT due to patient admitting to pain upon right calf palpation.  Request MRI to rule out OM  Dressed with betadine, 4x4 gauze, ABD, kerlix, and NO ACE BANDAGE.  Recommend antibiotics  Recommend ID consult for IV abx  Podiatry will follow while in house.  Discussed with Attending Dr. Jain

## 2022-07-18 NOTE — PATIENT PROFILE ADULT - FALL HARM RISK - UNIVERSAL INTERVENTIONS
Bed in lowest position, wheels locked, appropriate side rails in place/Call bell, personal items and telephone in reach/Instruct patient to call for assistance before getting out of bed or chair/Non-slip footwear when patient is out of bed/[place] to call system/Physically safe environment - no spills, clutter or unnecessary equipment/Purposeful Proactive Rounding/Room/bathroom lighting operational, light cord in reach

## 2022-07-18 NOTE — PROGRESS NOTE ADULT - ASSESSMENT
56 year old male with medical history significant for IDDM and prior diabetic foot ulcers/OM  s/p partial 5th, partial 1st ray amp on right, sp partial 1st , Skin grafting rt foot came to ed with fever 102 on Friday , suddenly the skin flap sort of opened and got red and swollen and some discharge. he is followed by Dr Jain by  podiatry and recommedns to come to ed. started iv merrem and zosyn ,seen by podiatry,ID. High esr/crp. x-ray foot pending result. mri foot order to r/o OM.        Rt foot wound cellulitis r/o OM  -start Merrem and Vanco for broad spectrum coverage,F/U Vanco trough.  -podiatry following   -MRI foot  -wound care  -f/u cxs  -f/u id rec  -xray foot ,pending result  -DVT PPX- Lovenox      DM  -check a1c  -Cont long acting coverage with Lantus  -[facility]    care of plan dw pt  dw id

## 2022-07-18 NOTE — PROGRESS NOTE ADULT - SUBJECTIVE AND OBJECTIVE BOX
· Chief Complaint: The patient is a 56y Male complaining of wound check.  · HPI Objective Statement: 57 y/o male hx dm, chronic right foot ulcer sent by Dr. Alcazar office for iv abx and admission due to worsening foot ulcer. denies pain, no f/c, no other complaints. anaphylaxis to penicillin per pt.     	ROS: No fever/chills. No eye pain/changes in vision, No ear pain/sore throat/dysphagia, No chest pain/palpitations. No SOB/cough/. No abdominal pain, N/V/D, no black/bloody bm. No dysuria/frequency/discharge, No headache. No Dizziness. No numbness/tingling/weakness.    Podiatry HPI: Patient is a 56M PMH DM, h/o gout, sent in by Dr. Jain office for worsening R foot wound. Patient states that wound was getting worse and was seen in office over the weekend and was sent in for IV abx. Patient states he has no pain and no sensation to foot. He states that he has no other pedal complaints. Patient understands he may need amputation of the right 4th toe. Patient denies any fever, shares that on admission it was 99.5. Denies nausea, vomiting sob, chills, chest pain     Patient admits to  (-) Fevers, (-) Chills, (-) Nausea, (-) Vomiting, (-) Shortness of Breath (-) calf pain (-) chest pain     Podiatry interval HPI: Patient seen in bed, resting comfortably, Denies any acute overnight events. Denies any changes in his symptoms. Denies constitutional symptoms. Pending MRI R foot.    Medications acetaminophen     Tablet .. 650 milliGRAM(s) Oral every 6 hours PRN  dextrose 5%. 1000 milliLiter(s) IV Continuous <Continuous>  dextrose 5%. 1000 milliLiter(s) IV Continuous <Continuous>  dextrose 50% Injectable 25 Gram(s) IV Push once  dextrose 50% Injectable 12.5 Gram(s) IV Push once  dextrose 50% Injectable 25 Gram(s) IV Push once  dextrose Oral Gel 15 Gram(s) Oral once PRN  enoxaparin Injectable 40 milliGRAM(s) SubCutaneous every 24 hours  glucagon  Injectable 1 milliGRAM(s) IntraMuscular once  insulin lispro (ADMELOG) corrective regimen sliding scale   SubCutaneous three times a day before meals  meropenem  IVPB 1000 milliGRAM(s) IV Intermittent every 8 hours  oxycodone    5 mG/acetaminophen 325 mG 1 Tablet(s) Oral every 6 hours PRN  sodium chloride 0.9%. 1000 milliLiter(s) IV Continuous <Continuous>  vancomycin  IVPB 1000 milliGRAM(s) IV Intermittent once    FHFHx: diabetes mellitus (Grandparent)    ,   PMHDiabetes mellitus    H/O diabetic neuropathy       PSHHistory of partial amputation of toe        Labs                          13.3   10.65 )-----------( 188      ( 18 Jul 2022 08:15 )             38.5      07-18    134<L>  |  103  |  13.9  ----------------------------<  195<H>  4.3   |  20.0<L>  |  0.96    Ca    8.5<L>      18 Jul 2022 08:14  Mg     1.9     07-17    TPro  7.4  /  Alb  3.8  /  TBili  0.8  /  DBili  x   /  AST  11  /  ALT  10  /  AlkPhos  79  07-17     Vital Signs Last 24 Hrs  T(C): 36.6 (18 Jul 2022 11:46), Max: 38.6 (17 Jul 2022 21:03)  T(F): 97.9 (18 Jul 2022 11:46), Max: 101.4 (17 Jul 2022 21:03)  HR: 73 (18 Jul 2022 11:46) (68 - 90)  BP: 148/82 (18 Jul 2022 11:46) (104/62 - 148/82)  BP(mean): --  RR: 18 (18 Jul 2022 11:46) (17 - 18)  SpO2: 96% (18 Jul 2022 11:46) (94% - 99%)    Parameters below as of 18 Jul 2022 11:46  Patient On (Oxygen Delivery Method): room air      Sedimentation Rate, Erythrocyte: 48 mm/hr (07-17-22 @ 21:57)         C-Reactive Protein, Serum: 204 mg/L (07-17-22 @ 13:49)   WBC Count: 10.65 K/uL *H* (07-18-22 @ 08:15)  WBC Count: 13.89 K/uL *H* (07-17-22 @ 13:49)      Derm: R foot wound noted to plantar submet4 measures 1cm x 1cm x 1.0 with tunneling noted to dorsal 4th metatarsal head with opening measures 0.5cm x 0.2cm. Scant purulent drainage with positive probe to bone to 4th metatarsal head. No fluctuance no malodor, clinical signs of osteomyelitis noted to R 4th metatarsal head  Vasc: DP/PT pulses palpable 2/4 b/l. Mild edema noted to right lateral aspect foot. Skin temperature noted to be warm to cool  from proximal to distal b/l. Pedal hair growth present  Neuro: Protective and epicritic sensation grossly absent  Musc:  s/p partial 5th, partial 1st ray amp on right, sp partial 1st amp on left      xrays 7/17: pending read  MRI Pending          A:  Right foot 4th metatarsal Osteomyelitis   Right foot wound   DM Type II       P:  Patient evaluated and chart reviewed   Patient WBC count noted to be downtrending (13 --> 10)  patient afebrile  Applied betadine DSD to the R foot area   Discussed conservative and surgical management of R 4th metatarsal head osteomyelitis   Pt aware of all options including long term iv abx vs R foot 4th metatarsal head amputation   Pt opted for surgical management including R 4th metatarsal head resection   Request medical optimization  Pending MRI R foot   Pt to go for R 4th metatarsal head resection Tuesday after 12 or Wednesday aftr 1pm pending OR booking,   Appreciate ID reccs   Will cont to follow   Discussed and seen bedside with attending Dr. Zendejas    · Chief Complaint: The patient is a 56y Male complaining of wound check.  · HPI Objective Statement: 57 y/o male hx dm, chronic right foot ulcer sent by Dr. Alcazar office for iv abx and admission due to worsening foot ulcer. denies pain, no f/c, no other complaints. anaphylaxis to penicillin per pt.     	ROS: No fever/chills. No eye pain/changes in vision, No ear pain/sore throat/dysphagia, No chest pain/palpitations. No SOB/cough/. No abdominal pain, N/V/D, no black/bloody bm. No dysuria/frequency/discharge, No headache. No Dizziness. No numbness/tingling/weakness.    Podiatry HPI: Patient is a 56M PMH DM, h/o gout, sent in by Dr. Jain office for worsening R foot wound. Patient states that wound was getting worse and was seen in office over the weekend and was sent in for IV abx. Patient states he has no pain and no sensation to foot. He states that he has no other pedal complaints. Patient understands he may need amputation of the right 4th toe. Patient denies any fever, shares that on admission it was 99.5. Denies nausea, vomiting sob, chills, chest pain     Patient admits to  (-) Fevers, (-) Chills, (-) Nausea, (-) Vomiting, (-) Shortness of Breath (-) calf pain (-) chest pain     Podiatry interval HPI: Patient seen in bed, resting comfortably, Denies any acute overnight events. Denies any changes in his symptoms. Denies constitutional symptoms. Pending MRI R foot.    Medications acetaminophen     Tablet .. 650 milliGRAM(s) Oral every 6 hours PRN  dextrose 5%. 1000 milliLiter(s) IV Continuous <Continuous>  dextrose 5%. 1000 milliLiter(s) IV Continuous <Continuous>  dextrose 50% Injectable 25 Gram(s) IV Push once  dextrose 50% Injectable 12.5 Gram(s) IV Push once  dextrose 50% Injectable 25 Gram(s) IV Push once  dextrose Oral Gel 15 Gram(s) Oral once PRN  enoxaparin Injectable 40 milliGRAM(s) SubCutaneous every 24 hours  glucagon  Injectable 1 milliGRAM(s) IntraMuscular once  insulin lispro (ADMELOG) corrective regimen sliding scale   SubCutaneous three times a day before meals  meropenem  IVPB 1000 milliGRAM(s) IV Intermittent every 8 hours  oxycodone    5 mG/acetaminophen 325 mG 1 Tablet(s) Oral every 6 hours PRN  sodium chloride 0.9%. 1000 milliLiter(s) IV Continuous <Continuous>  vancomycin  IVPB 1000 milliGRAM(s) IV Intermittent once    FHFHx: diabetes mellitus (Grandparent)    ,   PMHDiabetes mellitus    H/O diabetic neuropathy       PSHHistory of partial amputation of toe        Labs                          13.3   10.65 )-----------( 188      ( 18 Jul 2022 08:15 )             38.5      07-18    134<L>  |  103  |  13.9  ----------------------------<  195<H>  4.3   |  20.0<L>  |  0.96    Ca    8.5<L>      18 Jul 2022 08:14  Mg     1.9     07-17    TPro  7.4  /  Alb  3.8  /  TBili  0.8  /  DBili  x   /  AST  11  /  ALT  10  /  AlkPhos  79  07-17     Vital Signs Last 24 Hrs  T(C): 36.6 (18 Jul 2022 11:46), Max: 38.6 (17 Jul 2022 21:03)  T(F): 97.9 (18 Jul 2022 11:46), Max: 101.4 (17 Jul 2022 21:03)  HR: 73 (18 Jul 2022 11:46) (68 - 90)  BP: 148/82 (18 Jul 2022 11:46) (104/62 - 148/82)  BP(mean): --  RR: 18 (18 Jul 2022 11:46) (17 - 18)  SpO2: 96% (18 Jul 2022 11:46) (94% - 99%)    Parameters below as of 18 Jul 2022 11:46  Patient On (Oxygen Delivery Method): room air      Sedimentation Rate, Erythrocyte: 48 mm/hr (07-17-22 @ 21:57)         C-Reactive Protein, Serum: 204 mg/L (07-17-22 @ 13:49)   WBC Count: 10.65 K/uL *H* (07-18-22 @ 08:15)  WBC Count: 13.89 K/uL *H* (07-17-22 @ 13:49)      Derm: R foot wound noted to plantar submet4 measures 1cm x 1cm x 1.0 with tunneling noted to dorsal 4th metatarsal head with opening measures 0.5cm x 0.2cm. Scant purulent drainage with positive probe to bone to 4th metatarsal head. No fluctuance no malodor, clinical signs of osteomyelitis noted to R 4th metatarsal head  Vasc: DP/PT pulses palpable 2/4 b/l. Mild edema noted to right lateral aspect foot. Skin temperature noted to be warm to cool  from proximal to distal b/l. Pedal hair growth present  Neuro: Protective and epicritic sensation grossly absent  Musc:  s/p partial 5th, partial 1st ray amp on right, sp partial 1st amp on left      xrays 7/17: pending read  MRI Pending          A:  Right foot 4th metatarsal Osteomyelitis   Right foot wound   DM Type II       P:  Patient evaluated and chart reviewed   Patient WBC count noted to be downtrending (13 --> 10)  patient afebrile  Applied betadine DSD to the R foot area   Discussed conservative and surgical management of R 4th metatarsal head osteomyelitis   Pt aware of all options including long term iv abx vs R foot 4th metatarsal head amputation   Pt opted for surgical management including R 4th metatarsal head resection   Request medical optimization  Pending MRI R foot   Pt to go for R 4th metatarsal head resection Wednesday 2pm   Appreciate ID reccs   Will cont to follow   Discussed and seen bedside with attending Dr. Zendejas

## 2022-07-18 NOTE — PROGRESS NOTE ADULT - SUBJECTIVE AND OBJECTIVE BOX
Patient is a 56y old  Male who presents with a chief complaint of diabetic foot infection (2022 12:29)    pt is c/o mild pain rt foot wound side. denies any fever,chill,n/v/d,cp,sob,dysuria,trauma.  REVIEW OF SYSTEMS: All systems are reviewed and found to be negative except above    MEDICATIONS  (STANDING):  dextrose 5%. 1000 milliLiter(s) (50 mL/Hr) IV Continuous <Continuous>  dextrose 5%. 1000 milliLiter(s) (100 mL/Hr) IV Continuous <Continuous>  dextrose 50% Injectable 25 Gram(s) IV Push once  dextrose 50% Injectable 12.5 Gram(s) IV Push once  dextrose 50% Injectable 25 Gram(s) IV Push once  enoxaparin Injectable 40 milliGRAM(s) SubCutaneous every 24 hours  glucagon  Injectable 1 milliGRAM(s) IntraMuscular once  insulin lispro (ADMELOG) corrective regimen sliding scale   SubCutaneous three times a day before meals  meropenem  IVPB 1000 milliGRAM(s) IV Intermittent every 8 hours  sodium chloride 0.9%. 1000 milliLiter(s) (80 mL/Hr) IV Continuous <Continuous>  vancomycin  IVPB 1000 milliGRAM(s) IV Intermittent once    MEDICATIONS  (PRN):  acetaminophen     Tablet .. 650 milliGRAM(s) Oral every 6 hours PRN Temp greater or equal to 38.5C (101.3F)  dextrose Oral Gel 15 Gram(s) Oral once PRN Blood Glucose LESS THAN 70 milliGRAM(s)/deciliter  oxycodone    5 mG/acetaminophen 325 mG 1 Tablet(s) Oral every 6 hours PRN Severe Pain (7 - 10)      CAPILLARY BLOOD GLUCOSE      POCT Blood Glucose.: 236 mg/dL (2022 11:45)  POCT Blood Glucose.: 177 mg/dL (2022 07:44)  POCT Blood Glucose.: 210 mg/dL (2022 21:32)  POCT Blood Glucose.: 223 mg/dL (2022 18:21)    I&O's Summary      PHYSICAL EXAM:  Vital Signs Last 24 Hrs  T(C): 36.6 (2022 11:46), Max: 38.6 (2022 21:03)  T(F): 97.9 (2022 11:46), Max: 101.4 (2022 21:03)  HR: 73 (2022 11:46) (68 - 83)  BP: 148/82 (2022 11:46) (104/62 - 148/82)  BP(mean): --  RR: 18 (2022 11:46) (17 - 18)  SpO2: 96% (2022 11:46) (94% - 98%)    Parameters below as of 2022 11:46  Patient On (Oxygen Delivery Method): room air        CONSTITUTIONAL: NAD,  EYES: PERRLA; conjunctiva and sclera clear  ENMT: Moist oral mucosa,   RESPIRATORY: Normal respiratory effort; lungs are clear to auscultation bilaterally  CARDIOVASCULAR: Regular rate and rhythm, normal S1 and S2, no murmur   EXTS: No lower extremity edema; Peripheral pulses are 2+ bilaterally. rt foot: 4th metatarsal area with open wound w/ Scant purulent drainage,tender.no fluctuation metatarsal head. No fluctuance   s/p partial 5th, partial 1st ray amp on right, sp partial 1st amp on left  ABDOMEN: Nontender to palpation, normoactive bowel sounds, no rebound/guarding;   MUSCLOSKELETAL:   no clubbing or cyanosis of digits; no joint swelling   PSYCH: affect appropriate  NEUROLOGY: A+O to person, place, and time; CN 2-12 are intact and symmetric; no gross sensory/motor deficits;       LABS:                        13.3   10.65 )-----------( 188      ( 2022 08:15 )             38.5     07-18    134<L>  |  103  |  13.9  ----------------------------<  195<H>  4.3   |  20.0<L>  |  0.96    Ca    8.5<L>      2022 08:14  Mg     1.9     07-17    TPro  7.4  /  Alb  3.8  /  TBili  0.8  /  DBili  x   /  AST  11  /  ALT  10  /  AlkPhos  79  07-17    PT/INR - ( 2022 13:49 )   PT: 13.1 sec;   INR: 1.13 ratio         PTT - ( 2022 13:49 )  PTT:26.8 sec      Urinalysis Basic - ( 2022 14:56 )    Color: Yellow / Appearance: Clear / S.020 / pH: x  Gluc: x / Ketone: Trace  / Bili: Negative / Urobili: Negative mg/dL   Blood: x / Protein: 30 mg/dL / Nitrite: Negative   Leuk Esterase: Negative / RBC: 0-2 /HPF / WBC 0-2 /HPF   Sq Epi: x / Non Sq Epi: Occasional / Bacteria: Occasional          RADIOLOGY & ADDITIONAL TESTS:  Results Reviewed:

## 2022-07-19 ENCOUNTER — TRANSCRIPTION ENCOUNTER (OUTPATIENT)
Age: 57
End: 2022-07-19

## 2022-07-19 LAB
ANION GAP SERPL CALC-SCNC: 11 MMOL/L — SIGNIFICANT CHANGE UP (ref 5–17)
BASOPHILS # BLD AUTO: 0.02 K/UL — SIGNIFICANT CHANGE UP (ref 0–0.2)
BASOPHILS NFR BLD AUTO: 0.3 % — SIGNIFICANT CHANGE UP (ref 0–2)
BUN SERPL-MCNC: 12.4 MG/DL — SIGNIFICANT CHANGE UP (ref 8–20)
CALCIUM SERPL-MCNC: 8.8 MG/DL — SIGNIFICANT CHANGE UP (ref 8.6–10.2)
CHLORIDE SERPL-SCNC: 100 MMOL/L — SIGNIFICANT CHANGE UP (ref 98–107)
CO2 SERPL-SCNC: 24 MMOL/L — SIGNIFICANT CHANGE UP (ref 22–29)
CREAT SERPL-MCNC: 1.06 MG/DL — SIGNIFICANT CHANGE UP (ref 0.5–1.3)
CULTURE RESULTS: SIGNIFICANT CHANGE UP
EGFR: 83 ML/MIN/1.73M2 — SIGNIFICANT CHANGE UP
EOSINOPHIL # BLD AUTO: 0.12 K/UL — SIGNIFICANT CHANGE UP (ref 0–0.5)
EOSINOPHIL NFR BLD AUTO: 1.5 % — SIGNIFICANT CHANGE UP (ref 0–6)
GLUCOSE BLDC GLUCOMTR-MCNC: 146 MG/DL — HIGH (ref 70–99)
GLUCOSE BLDC GLUCOMTR-MCNC: 171 MG/DL — HIGH (ref 70–99)
GLUCOSE BLDC GLUCOMTR-MCNC: 172 MG/DL — HIGH (ref 70–99)
GLUCOSE BLDC GLUCOMTR-MCNC: 208 MG/DL — HIGH (ref 70–99)
GLUCOSE BLDC GLUCOMTR-MCNC: 300 MG/DL — HIGH (ref 70–99)
GLUCOSE SERPL-MCNC: 202 MG/DL — HIGH (ref 70–99)
HCT VFR BLD CALC: 36.9 % — LOW (ref 39–50)
HGB BLD-MCNC: 13.2 G/DL — SIGNIFICANT CHANGE UP (ref 13–17)
IMM GRANULOCYTES NFR BLD AUTO: 0.3 % — SIGNIFICANT CHANGE UP (ref 0–1.5)
LYMPHOCYTES # BLD AUTO: 1.56 K/UL — SIGNIFICANT CHANGE UP (ref 1–3.3)
LYMPHOCYTES # BLD AUTO: 19.9 % — SIGNIFICANT CHANGE UP (ref 13–44)
MCHC RBC-ENTMCNC: 30.8 PG — SIGNIFICANT CHANGE UP (ref 27–34)
MCHC RBC-ENTMCNC: 35.8 GM/DL — SIGNIFICANT CHANGE UP (ref 32–36)
MCV RBC AUTO: 86 FL — SIGNIFICANT CHANGE UP (ref 80–100)
MONOCYTES # BLD AUTO: 0.61 K/UL — SIGNIFICANT CHANGE UP (ref 0–0.9)
MONOCYTES NFR BLD AUTO: 7.8 % — SIGNIFICANT CHANGE UP (ref 2–14)
NEUTROPHILS # BLD AUTO: 5.51 K/UL — SIGNIFICANT CHANGE UP (ref 1.8–7.4)
NEUTROPHILS NFR BLD AUTO: 70.2 % — SIGNIFICANT CHANGE UP (ref 43–77)
PLATELET # BLD AUTO: 217 K/UL — SIGNIFICANT CHANGE UP (ref 150–400)
POTASSIUM SERPL-MCNC: 4.3 MMOL/L — SIGNIFICANT CHANGE UP (ref 3.5–5.3)
POTASSIUM SERPL-SCNC: 4.3 MMOL/L — SIGNIFICANT CHANGE UP (ref 3.5–5.3)
RBC # BLD: 4.29 M/UL — SIGNIFICANT CHANGE UP (ref 4.2–5.8)
RBC # FLD: 12 % — SIGNIFICANT CHANGE UP (ref 10.3–14.5)
SARS-COV-2 RNA SPEC QL NAA+PROBE: SIGNIFICANT CHANGE UP
SODIUM SERPL-SCNC: 135 MMOL/L — SIGNIFICANT CHANGE UP (ref 135–145)
SPECIMEN SOURCE: SIGNIFICANT CHANGE UP
WBC # BLD: 7.84 K/UL — SIGNIFICANT CHANGE UP (ref 3.8–10.5)
WBC # FLD AUTO: 7.84 K/UL — SIGNIFICANT CHANGE UP (ref 3.8–10.5)

## 2022-07-19 PROCEDURE — 99232 SBSQ HOSP IP/OBS MODERATE 35: CPT

## 2022-07-19 PROCEDURE — 73718 MRI LOWER EXTREMITY W/O DYE: CPT | Mod: 26,RT

## 2022-07-19 PROCEDURE — 99233 SBSQ HOSP IP/OBS HIGH 50: CPT

## 2022-07-19 RX ORDER — INSULIN LISPRO 100/ML
5 VIAL (ML) SUBCUTANEOUS
Refills: 0 | Status: DISCONTINUED | OUTPATIENT
Start: 2022-07-19 | End: 2022-07-20

## 2022-07-19 RX ORDER — INSULIN GLARGINE 100 [IU]/ML
5 INJECTION, SOLUTION SUBCUTANEOUS ONCE
Refills: 0 | Status: DISCONTINUED | OUTPATIENT
Start: 2022-07-19 | End: 2022-07-19

## 2022-07-19 RX ORDER — INSULIN GLARGINE 100 [IU]/ML
10 INJECTION, SOLUTION SUBCUTANEOUS ONCE
Refills: 0 | Status: COMPLETED | OUTPATIENT
Start: 2022-07-19 | End: 2022-07-19

## 2022-07-19 RX ORDER — INSULIN LISPRO 100/ML
3 VIAL (ML) SUBCUTANEOUS
Refills: 0 | Status: DISCONTINUED | OUTPATIENT
Start: 2022-07-19 | End: 2022-07-19

## 2022-07-19 RX ADMIN — MEROPENEM 100 MILLIGRAM(S): 1 INJECTION INTRAVENOUS at 22:21

## 2022-07-19 RX ADMIN — SODIUM CHLORIDE 50 MILLILITER(S): 9 INJECTION INTRAMUSCULAR; INTRAVENOUS; SUBCUTANEOUS at 12:44

## 2022-07-19 RX ADMIN — MEROPENEM 100 MILLIGRAM(S): 1 INJECTION INTRAVENOUS at 10:52

## 2022-07-19 RX ADMIN — Medication 2: at 11:16

## 2022-07-19 RX ADMIN — SODIUM CHLORIDE 80 MILLILITER(S): 9 INJECTION INTRAMUSCULAR; INTRAVENOUS; SUBCUTANEOUS at 09:55

## 2022-07-19 RX ADMIN — Medication 6: at 08:07

## 2022-07-19 RX ADMIN — ENOXAPARIN SODIUM 40 MILLIGRAM(S): 100 INJECTION SUBCUTANEOUS at 06:01

## 2022-07-19 RX ADMIN — INSULIN GLARGINE 10 UNIT(S): 100 INJECTION, SOLUTION SUBCUTANEOUS at 13:26

## 2022-07-19 RX ADMIN — Medication 166.67 MILLIGRAM(S): at 15:37

## 2022-07-19 RX ADMIN — Medication 166.67 MILLIGRAM(S): at 06:00

## 2022-07-19 RX ADMIN — Medication 5 UNIT(S): at 16:26

## 2022-07-19 RX ADMIN — Medication 4: at 16:26

## 2022-07-19 RX ADMIN — MEROPENEM 100 MILLIGRAM(S): 1 INJECTION INTRAVENOUS at 06:00

## 2022-07-19 NOTE — PRE-ANESTHESIA EVALUATION ADULT - NSANTHPMHFT_GEN_ALL_CORE
56 year old male with medical history significant for IDDM and prior diabetic foot ulcers/OM  s/p partial 5th, partial 1st ray amp on right, sp partial 1st , Skin grafting rt foot came to ed with fever 102.

## 2022-07-19 NOTE — PROGRESS NOTE ADULT - ASSESSMENT
56 year old male with medical history significant for IDDM and prior diabetic foot ulcers/OM  s/p partial 5th, partial 1st ray amp on right, sp partial 1st , Skin grafting rt foot came to ed with fever 102 on Friday , suddenly the skin flap sort of opened and got red and swollen and some discharge. he is followed by Dr Jain by  podiatry and recommedns to come to ed. started iv merrem and zosyn ,seen by podiatry,ID. High esr/crp. x-ray foot pending result. mri foot order to r/o OM.        Rt foot wound cellulitis with suspected  OM  -Continue Merrem and Vanco for broad spectrum coverage,F/U Vanco trough.  -podiatry following, likely OR tomorrow, will f/u rec  -MRI foot reviewed as above  -wound care  -bl c/s NGTD  -f/u cxs  -f/u id rec  -DVT PPX- Lovenox      Uncontrolled  DM With hyperglycemia  -a1c 9.4  - Lantus w/meal coverage  -[facility]    care of plan dw pt     56 year old male with medical history significant for IDDM and prior diabetic foot ulcers/OM  s/p partial 5th, partial 1st ray amp on right, sp partial 1st , Skin grafting rt foot came to ed with fever 102 on Friday , suddenly the skin flap sort of opened and got red and swollen and some discharge. he is followed by Dr Jain by  podiatry and recommedns to come to ed. started iv merrem and zosyn ,seen by podiatry,ID. High esr/crp. x-ray foot pending result. mri foot order to r/o OM.        Rt foot wound cellulitis with suspected  OM  -Continue Merrem and Vanco for broad spectrum coverage,F/U Vanco trough.  -podiatry following, likely OR tomorrow, will f/u rec  -MRI foot reviewed as above  -wound care  -bl c/s NGTD  -f/u cxs  -f/u id rec  -DVT PPX- Lovenox      Uncontrolled  DM With hyperglycemia  -a1c 9.4  - Lantus w/meal coverage  -[facility]  pt does not have any cardiac/pulmonary disease. No absolute  contraindications  for R 4th partial ray amputation  care of plan dw pt

## 2022-07-19 NOTE — PROGRESS NOTE ADULT - ASSESSMENT
56 year old male with medical history significant for IDDM and prior diabetic foot ulcers needing toe amputations in the past presented with fever , redness and swelling of RLE as well as chronic rt plantar ulcer x 2 months.    Cellulitis of right foot with  plantar wound infection-skin graft also appears involved. MR as above with   c/w empiric meropenem and vancomycin. Monitor trough and adjust per phamacy protocol. f/u wound cx. f/u bcx      no dysuria mp nauseas no vomiting no diarrhea     AS ABOVE PT   WITH ULCER ON BASE OF FOOT  WITH DRAINAGE  PT PLACED ON IV ABX  BLOOD CX X2 SETS DONE  ngtd  ON MERREM VANCO  HAD PREVIOUS INFECTION  WITH AMPUTATION nov 2021  WAS ON INVANZ/VANCO IN PAST AT HOME  PLAN IV ABX   MRi as above plan for rt 4th ray amp tomorrow    d/w podiatry           56 year old male with medical history significant for IDDM and prior diabetic foot ulcers needing toe amputations in the past presented with fever, redness and swelling of RLE as well as chronic rt plantar ulcer x 2 months.    Cellulitis of right foot with plantar wound infection and osteomyelitis-  afebrile  swelling and erythema has improved  skin graft also appears involved  MR as above with abnormal marrow signal 4th ray concerning for OM given + wound at this site  c/w empiric meropenem and vancomycin.   Monitor trough and adjust per pharmacy protocol.   f/u wound cx  f/u bcx  plan for partial rt 4th ray amputation tomorrow-please send OR cx and path  patient may require IV antibiotics upon discharge        d/w podiatry    will f/u

## 2022-07-19 NOTE — PROGRESS NOTE ADULT - SUBJECTIVE AND OBJECTIVE BOX
INFECTIOUS DISEASES AND INTERNAL MEDICINE at [place]  =======================================================  Siva Reynolds MD  Diplomates [employer] Board of Internal Medicine and Infectious Diseases  Telephone [phone]  Fax            [phone]  =======================================================    ZEINAB [id number]      ID f/u- osteomyelitis  afebrile  feels well  reports swelling in right leg and foot has improved  plan for partial 4th ray amputation tomorrow      PAST MEDICAL & SURGICAL HISTORY:  Diabetes mellitus      H/O diabetic neuropathy      History of partial amputation of toe          ANTIBIOTICS  meropenem  IVPB 1000 milliGRAM(s) IV Intermittent every 8 hours  vancomycin  IVPB 1000 milliGRAM(s) IV Intermittent once      Allergies    penicillin (Anaphylaxis)  sulfa drugs (Other)    Intolerances    Keflex (Other)          REVIEW OF SYSTEMS:    CONSTITUTIONAL:  As per HPI.    HEENT:  Eyes:  No diplopia or blurred vision. ENT:  No earache, sore throat or runny nose.    CARDIOVASCULAR:  No pressure, squeezing, strangling, tightness, heaviness or aching about the chest, neck, axilla or epigastrium.    RESPIRATORY:  No cough, shortness of breath, PND or orthopnea.    GASTROINTESTINAL:  No nausea, vomiting or diarrhea.    GENITOURINARY:  No dysuria, frequency or urgency.    MUSCULOSKELETAL:  As per HPI.    SKIN:   AS PER HPI     NEUROLOGIC:  No paresthesias, fasciculations, seizures or weakness.                  PHYSICAL EXAMINATION:    GENERAL: The patient is a _____in no apparent distress. ___     Vital Signs Last 24 Hrs  T(C): 36.7 (2022 15:41), Max: 36.8 (2022 04:55)  T(F): 98 (2022 15:41), Max: 98.3 (2022 04:55)  HR: 73 (2022 15:41) (65 - 73)  BP: 146/90 (2022 15:41) (116/74 - 146/90)  BP(mean): --  RR: 18 (2022 15:41) (18 - 18)  SpO2: 98% (2022 15:41) (94% - 98%)    Parameters below as of 2022 10:34  Patient On (Oxygen Delivery Method): room air      HEENT: Head is normocephalic and atraumatic.  ANICTERIC  NECK: Supple. No carotid bruits.  No lymphadenopathy or thyromegaly.    LUNGS:COARSE BREATH SOUNDS    HEART: Regular rate and rhythm without murmur.    ABDOMEN: Soft, nontender, and nondistended.  Positive bowel sounds.  No hepatosplenomegaly was noted. NO REBOUND NO GUARDING    EXTREMITIES: R FOOT WITH ULCER ON PLANTAR SURFACE NPO ODOR SOME Drainage  DORSUM OLD SKIN GRAFT WITH SURROUNDING EDEMA NAD ERYTHEMA + drainage at distal edge  S/PAMP 1ST AND 5th TOES     NEUROLOGIC: NON FOCAL               BLOOD CULTURES       URINE CX          LABS:                                   13.2   7.84  )-----------( 217      ( 2022 07:07 )             36.9       07-19    135  |  100  |  12.4  ----------------------------<  202<H>  4.3   |  24.0  |  1.06    Ca    8.8      2022 07:07                              CAPILLARY BLOOD GLUCOSE      POCT Blood Glucose.: 208 mg/dL (2022 16:21)              Color: Yellow / Appearance: Clear / S.020 / pH: x  Gluc: x / Ketone: Trace  / Bili: Negative / Urobili: Negative mg/dL   Blood: x / Protein: 30 mg/dL / Nitrite: Negative   Leuk Esterase: Negative / RBC: 0-2 /HPF / WBC 0-2 /HPF   Sq Epi: x / Non Sq Epi: Occasional / Bacteria: Occasional        RADIOLOGY & ADDITIONAL STUDIES:  < from: MR Foot No Cont, Right (22 @ 01:44) >  IMPRESSION:    1.  Skin wound at the medial lateral forefoot with soft tissue swelling   concerning for cellulitis. No drainable fluid collection.  2.  Abnormal marrow signal within the second and fourth rays as detailed   above. Given the adjacent skin wound, findings are concerning for   osteomyelitis.  3.  Abnormal marrow signal within the navicula and medial cuneiform. No   definite adjacent skin wound. The findings likely reflect stress   reaction. Correlate clinically for skin wound which would increase the   probability of osteomyelitis in these regions.  4.  Pathologic fracture at the base of the second proximal phalanx.    --- End of Report ---      < end of copied text >      ASSESSMENT/PLAN  56 year old male with medical history significant for IDDM and prior diabetic foot ulcers needing toe amputations in the past. also had fever 102 on Friday denied any chills , denied cp or sob, but does have some cough and has not been vaccinated against covid. foot wound ulcer not preceded by any injury or bug bite , his prior toe amputations were years ago- suddenly the skin flap sort of opened and got red and swollen and some discharge he is followed by Dr Jain by  podiatry   no dysuria mp nauseas no vomiting no diarrhea     AS ABOVE PT   WITH ULCER ON BASE OF FOOT  WITH DRAINAGE  PT PLACED ON IV ABX  BLOOD CX X2 SETS DONE  ngtd  ON MERREM VANCO  HAD PREVIOUS INFECTION  WITH AMPUTATION 2021  WAS ON INVANZ/VANCO IN PAST AT HOME  PLAN IV ABX   MRi as above plan for rt 4th ray amp tomorrow    d/w podiatry               INFECTIOUS DISEASES AND INTERNAL MEDICINE at [place]  =======================================================  Siva Reynolds MD  Diplomates American Board of Internal Medicine and Infectious Diseases  Telephone [phone]  Fax            [phone]  =======================================================    ZEINAB [id number]      ID f/u- osteomyelitis  afebrile  feels well  reports swelling in right leg and foot has improved  plan for partial 4th ray amputation tomorrow      PAST MEDICAL & SURGICAL HISTORY:  Diabetes mellitus      H/O diabetic neuropathy      History of partial amputation of toe          ANTIBIOTICS  meropenem  IVPB 1000 milliGRAM(s) IV Intermittent every 8 hours  vancomycin  IVPB 1000 milliGRAM(s) IV Intermittent once      Allergies    penicillin (Anaphylaxis)  sulfa drugs (Other)    Intolerances    Keflex (Other)          REVIEW OF SYSTEMS:    CONSTITUTIONAL:  As per HPI.    HEENT:  Eyes:  No diplopia or blurred vision. ENT:  No earache, sore throat or runny nose.    CARDIOVASCULAR:  No pressure, squeezing, strangling, tightness, heaviness or aching about the chest, neck, axilla or epigastrium.    RESPIRATORY:  No cough, shortness of breath, PND or orthopnea.    GASTROINTESTINAL:  No nausea, vomiting or diarrhea.    GENITOURINARY:  No dysuria, frequency or urgency.    MUSCULOSKELETAL:  As per HPI.    SKIN:   AS PER HPI     NEUROLOGIC:  No paresthesias, fasciculations, seizures or weakness.                  PHYSICAL EXAMINATION:    GENERAL: The patient is a _____in no apparent distress. ___     Vital Signs Last 24 Hrs  T(C): 36.7 (2022 15:41), Max: 36.8 (2022 04:55)  T(F): 98 (2022 15:41), Max: 98.3 (2022 04:55)  HR: 73 (2022 15:41) (65 - 73)  BP: 146/90 (2022 15:41) (116/74 - 146/90)  BP(mean): --  RR: 18 (2022 15:41) (18 - 18)  SpO2: 98% (2022 15:41) (94% - 98%)    Parameters below as of 2022 10:34  Patient On (Oxygen Delivery Method): room air      HEENT: Head is normocephalic and atraumatic.  ANICTERIC  NECK: Supple. No carotid bruits.  No lymphadenopathy or thyromegaly.    LUNGS:COARSE BREATH SOUNDS    HEART: Regular rate and rhythm without murmur.    ABDOMEN: Soft, nontender, and nondistended.  Positive bowel sounds.  No hepatosplenomegaly was noted. NO REBOUND NO GUARDING    EXTREMITIES: R FOOT WITH ULCER ON PLANTAR SURFACE NPO ODOR SOME Drainage  DORSUM OLD SKIN GRAFT WITH SURROUNDING EDEMA NAD ERYTHEMA + drainage and wound at distal end of graft  S/P AMP 1ST AND 5th TOES     NEUROLOGIC: NON FOCAL                 LABS:                                   13.2   7.84  )-----------( 217      ( 2022 07:07 )             36.9       07-19    135  |  100  |  12.4  ----------------------------<  202<H>  4.3   |  24.0  |  1.06    Ca    8.8      2022 07:07                              CAPILLARY BLOOD GLUCOSE      POCT Blood Glucose.: 208 mg/dL (2022 16:21)              Color: Yellow / Appearance: Clear / S.020 / pH: x  Gluc: x / Ketone: Trace  / Bili: Negative / Urobili: Negative mg/dL   Blood: x / Protein: 30 mg/dL / Nitrite: Negative   Leuk Esterase: Negative / RBC: 0-2 /HPF / WBC 0-2 /HPF   Sq Epi: x / Non Sq Epi: Occasional / Bacteria: Occasional        RADIOLOGY & ADDITIONAL STUDIES:  < from: MR Foot No Cont, Right (22 @ 01:44) >  IMPRESSION:    1.  Skin wound at the medial lateral forefoot with soft tissue swelling   concerning for cellulitis. No drainable fluid collection.  2.  Abnormal marrow signal within the second and fourth rays as detailed   above. Given the adjacent skin wound, findings are concerning for   osteomyelitis.  3.  Abnormal marrow signal within the navicula and medial cuneiform. No   definite adjacent skin wound. The findings likely reflect stress   reaction. Correlate clinically for skin wound which would increase the   probability of osteomyelitis in these regions.  4.  Pathologic fracture at the base of the second proximal phalanx.    --- End of Report ---      < end of copied text >             INFECTIOUS DISEASES AND INTERNAL MEDICINE at [place]  =======================================================  Siva Reynolds MD  Diplomates American Board of Internal Medicine and Infectious Diseases  Telephone [phone]  Fax            [phone]  =======================================================    ZEINAB [id number]      ID f/u- osteomyelitis  afebrile  feels well  reports swelling in right leg and foot has improved  plan for partial 4th ray amputation tomorrow      PAST MEDICAL & SURGICAL HISTORY:  Diabetes mellitus      H/O diabetic neuropathy      History of partial amputation of toe          ANTIBIOTICS  meropenem  IVPB 1000 milliGRAM(s) IV Intermittent every 8 hours  vancomycin  IVPB 1000 milliGRAM(s) IV Intermittent once      Allergies    penicillin (Anaphylaxis)  sulfa drugs (Other)    Intolerances    Keflex (Other)          REVIEW OF SYSTEMS:    CONSTITUTIONAL:  As per HPI.    HEENT:  Eyes:  No diplopia or blurred vision. ENT:  No earache, sore throat or runny nose.    CARDIOVASCULAR:  No pressure, squeezing, strangling, tightness, heaviness or aching about the chest, neck, axilla or epigastrium.    RESPIRATORY:  No cough, shortness of breath, PND or orthopnea.    GASTROINTESTINAL:  No nausea, vomiting or diarrhea.    GENITOURINARY:  No dysuria, frequency or urgency.    MUSCULOSKELETAL:  As per HPI.    SKIN:   AS PER HPI     NEUROLOGIC:  No paresthesias, fasciculations, seizures or weakness.                  PHYSICAL EXAMINATION:    Vital Signs Last 24 Hrs  T(C): 36.7 (2022 15:41), Max: 36.8 (2022 04:55)  T(F): 98 (2022 15:41), Max: 98.3 (2022 04:55)  HR: 73 (2022 15:41) (65 - 73)  BP: 146/90 (2022 15:41) (116/74 - 146/90)  BP(mean): --  RR: 18 (2022 15:41) (18 - 18)  SpO2: 98% (2022 15:41) (94% - 98%)    Parameters below as of 2022 10:34  Patient On (Oxygen Delivery Method): room air      HEENT: Head is normocephalic and atraumatic.  ANICTERIC  NECK: Supple. No carotid bruits.  No lymphadenopathy or thyromegaly.    LUNGS:COARSE BREATH SOUNDS    HEART: Regular rate and rhythm without murmur.    ABDOMEN: Soft, nontender, and nondistended.  Positive bowel sounds.  No hepatosplenomegaly was noted. NO REBOUND NO GUARDING    EXTREMITIES: R FOOT WITH ULCER ON PLANTAR SURFACE no ODOR SOME Drainage  DORSUM OLD SKIN GRAFT WITH SURROUNDING EDEMA and ERYTHEMA + drainage and wound at distal end of graft  S/P AMP 1ST AND 5th TOES     NEUROLOGIC: NON FOCAL                 LABS:                                   13.2   7.84  )-----------( 217      ( 2022 07:07 )             36.9       07-    135  |  100  |  12.4  ----------------------------<  202<H>  4.3   |  24.0  |  1.06    Ca    8.8      2022 07:07                              CAPILLARY BLOOD GLUCOSE      POCT Blood Glucose.: 208 mg/dL (2022 16:21)              Color: Yellow / Appearance: Clear / S.020 / pH: x  Gluc: x / Ketone: Trace  / Bili: Negative / Urobili: Negative mg/dL   Blood: x / Protein: 30 mg/dL / Nitrite: Negative   Leuk Esterase: Negative / RBC: 0-2 /HPF / WBC 0-2 /HPF   Sq Epi: x / Non Sq Epi: Occasional / Bacteria: Occasional        RADIOLOGY & ADDITIONAL STUDIES:  < from: MR Foot No Cont, Right (22 @ 01:44) >  IMPRESSION:    1.  Skin wound at the medial lateral forefoot with soft tissue swelling   concerning for cellulitis. No drainable fluid collection.  2.  Abnormal marrow signal within the second and fourth rays as detailed   above. Given the adjacent skin wound, findings are concerning for   osteomyelitis.  3.  Abnormal marrow signal within the navicula and medial cuneiform. No   definite adjacent skin wound. The findings likely reflect stress   reaction. Correlate clinically for skin wound which would increase the   probability of osteomyelitis in these regions.  4.  Pathologic fracture at the base of the second proximal phalanx.    --- End of Report ---      < end of copied text >

## 2022-07-19 NOTE — PROGRESS NOTE ADULT - SUBJECTIVE AND OBJECTIVE BOX
· Chief Complaint: The patient is a 56y Male complaining of wound check.  · HPI Objective Statement: 57 y/o male hx dm, chronic right foot ulcer sent by Dr. Alcazar office for iv abx and admission due to worsening foot ulcer. denies pain, no f/c, no other complaints. anaphylaxis to penicillin per pt.     	ROS: No fever/chills. No eye pain/changes in vision, No ear pain/sore throat/dysphagia, No chest pain/palpitations. No SOB/cough/. No abdominal pain, N/V/D, no black/bloody bm. No dysuria/frequency/discharge, No headache. No Dizziness. No numbness/tingling/weakness.    Podiatry HPI: Patient is a 56M PMH DM, h/o gout, sent in by Dr. Jain office for worsening R foot wound. Patient states that wound was getting worse and was seen in office over the weekend and was sent in for IV abx. Patient states he has no pain and no sensation to foot. He states that he has no other pedal complaints. Patient understands he may need amputation of the right 4th toe. Patient denies any fever, shares that on admission it was 99.5. Denies nausea, vomiting sob, chills, chest pain     Patient admits to  (-) Fevers, (-) Chills, (-) Nausea, (-) Vomiting, (-) Shortness of Breath (-) calf pain (-) chest pain     Podiatry interval HPI: Patient seen in bed, resting comfortably, Denies any acute overnight events. Denies any changes in his symptoms. Denies constitutional symptoms. PT amendable for R foot 4th partial ray amputation tomorrow am at 730am with Dr. Zendejas.     Medications acetaminophen     Tablet .. 650 milliGRAM(s) Oral every 6 hours PRN  dextrose 5%. 1000 milliLiter(s) IV Continuous <Continuous>  dextrose 5%. 1000 milliLiter(s) IV Continuous <Continuous>  dextrose 50% Injectable 25 Gram(s) IV Push once  dextrose 50% Injectable 12.5 Gram(s) IV Push once  dextrose 50% Injectable 25 Gram(s) IV Push once  dextrose Oral Gel 15 Gram(s) Oral once PRN  enoxaparin Injectable 40 milliGRAM(s) SubCutaneous every 24 hours  glucagon  Injectable 1 milliGRAM(s) IntraMuscular once  insulin lispro (ADMELOG) corrective regimen sliding scale   SubCutaneous three times a day before meals  insulin lispro Injectable (ADMELOG) 5 Unit(s) SubCutaneous three times a day before meals  meropenem  IVPB 1000 milliGRAM(s) IV Intermittent every 8 hours  oxycodone    5 mG/acetaminophen 325 mG 1 Tablet(s) Oral every 6 hours PRN  sodium chloride 0.9%. 1000 milliLiter(s) IV Continuous <Continuous>  vancomycin  IVPB 1250 milliGRAM(s) IV Intermittent every 12 hours    FHFHx: diabetes mellitus (Grandparent)    ,   PMHDiabetes mellitus    H/O diabetic neuropathy       PSHHistory of partial amputation of toe        Labs                          13.2   7.84  )-----------( 217      ( 19 Jul 2022 07:07 )             36.9      07-19    135  |  100  |  12.4  ----------------------------<  202<H>  4.3   |  24.0  |  1.06    Ca    8.8      19 Jul 2022 07:07       Vital Signs Last 24 Hrs  T(C): 36.7 (19 Jul 2022 15:41), Max: 36.8 (19 Jul 2022 04:55)  T(F): 98 (19 Jul 2022 15:41), Max: 98.3 (19 Jul 2022 04:55)  HR: 73 (19 Jul 2022 15:41) (65 - 73)  BP: 146/90 (19 Jul 2022 15:41) (116/74 - 146/90)  BP(mean): --  RR: 18 (19 Jul 2022 15:41) (18 - 18)  SpO2: 98% (19 Jul 2022 15:41) (94% - 98%)    Parameters below as of 19 Jul 2022 10:34  Patient On (Oxygen Delivery Method): room air      Sedimentation Rate, Erythrocyte: 48 mm/hr (07-17-22 @ 21:57)         C-Reactive Protein, Serum: 204 mg/L (07-17-22 @ 13:49)   WBC Count: 7.84 K/uL (07-19-22 @ 07:07)      Derm: R foot wound noted to plantar submet4 measures 1cm x 1cm x 1.0 with tunneling noted to dorsal 4th metatarsal head with opening measures 0.5cm x 0.2cm. Scant purulent drainage with positive probe to bone to 4th metatarsal head. No fluctuance no malodor, clinical signs of osteomyelitis noted to R 4th metatarsal head  Vasc: DP/PT pulses palpable 2/4 b/l. Mild edema noted to right lateral aspect foot. Skin temperature noted to be warm to cool  from proximal to distal b/l. Pedal hair growth present  Neuro: Protective and epicritic sensation grossly absent  Musc:  s/p partial 5th, partial 1st ray amp on right, sp partial 1st amp on left      xrays 7/17:  < from: MR Foot No Cont, Right (07.19.22 @ 01:44) >  ACC: [id number] EXAM:  MR FOOT RT                          PROCEDURE DATE:  07/19/2022          INTERPRETATION:  MR FOOT RIGHT dated 7/19/2022 1:44 AM    INDICATION: Pain and swelling    COMPARISON: Foot radiographs dated 7/17/2022    TECHNIQUE: Multi-sequential, multiplanar MRI imaging of the right midfoot   and forefoot was performed per standard protocol.    FINDINGS:    BONE MARROW: The patient is status post amputation of the first ray to   level of the first metatarsal head and the fifth ray to level of the   fifth metatarsal head. There are erosive changes involving the base of   the second and fourth proximal phalanges and the second and fourth   metatarsal heads. There are areas of decreased bone T1 marrow signal with   associated marrow edema within the areas of erosions. There is a   pathologic fracture at the base of the second proximal phalanx. There is   joint space narrowing and productive changes at the first through third   tarsometatarsal joints. Decreased T1 signal and increased T2 signal noted   within the base of the second metatarsal and the middle cuneiform and   navicula.  SYNOVIUM/ JOINT FLUID: No large joint effusion  TENDONS: Postsurgical changes of the first and fifth flexor and extensor   tendons. High-grade partial tearing of the flexor tendons to the second   digit.  MUSCLES: Atrophy and edema in the intrinsic musculature the foot.  NEUROVASCULAR STRUCTURES: Preserved  SUBCUTANEOUS SOFT TISSUES: There are skin wounds over the medial and   lateral forefoot. Packing material is seen within the lateral forefoot   skin wound. There is edema about the forefoot. No drainable fluid   collection is seen.    IMPRESSION:    1.  Skin wound at the medial lateral forefoot with soft tissue swelling   concerning for cellulitis. No drainable fluid collection.  2.  Abnormal marrow signal within the second and fourth rays as detailed   above. Given the adjacent skin wound, findings are concerning for   osteomyelitis.  3.  Abnormal marrow signal within the navicula and medial cuneiform. No   definite adjacent skin wound. The findings likely reflect stress   reaction. Correlate clinically for skin wound which would increase the   probability of osteomyelitis in these regions.  4.  Pathologic fracture at the base of the second proximal phalanx.    --- End of Report ---          < end of copied text >  < from: Xray Foot AP + Lateral + Oblique, Right (07.17.22 @ 14:58) >  ACC: [id number] EXAM:  XR FOOT COMP MIN 3 VIEWS RT                          PROCEDURE DATE:  07/17/2022          INTERPRETATION:  Clinical history: 86-year-old male, diabetic foot   infection.    Three views of the right foot are correlated with the MRI of 7/19/2022   which better characterizes findings concerning for osteomyelitis and   pathological fracture of the proximal phalanx of the second digit.    IMPRESSION:  Findings concerning for osteomyelitis and pathologic fracture, better   characterized on MRI      < end of copied text >          A:  Right foot 4th metatarsal Osteomyelitis   Right foot wound   DM Type II       P:  Patient evaluated and chart reviewed   Patient WBC count noted to be downtrending (13 --> 10 --> 7 )  patient afebrile  Reviewed xrays & MRI R foot - results noted above  Applied betadine DSD to the R foot area   Discussed conservative and surgical management of R 4th metatarsal head osteomyelitis   Pt aware of all options including long term iv abx vs R foot 4th metatarsal head amputation   Pt opted for surgical management including R 4th partial ray amputation   Pt to go for R 4th partial ray amputation tomorrow 7/20 at 730am with Dr. Zendejas  Requesting medical optimization  Pt to be NPO past midnight 7/19  Covid pending, negative 7/17  Appreciate ID reccs   Will cont to follow   Discussed with Dr. Zendejas and seen bedside with attending Dr. Jain

## 2022-07-19 NOTE — PROGRESS NOTE ADULT - SUBJECTIVE AND OBJECTIVE BOX
Patient is a 56y old  Male who presents with a chief complaint of diabetic foot infection (2022 15:31)    pt is c/o mild rt foot pain,denies fever,chill,n/v/d,cp,sob  REVIEW OF SYSTEMS: All systems are reviewed and found to be negative except above    MEDICATIONS  (STANDING):  dextrose 5%. 1000 milliLiter(s) (50 mL/Hr) IV Continuous <Continuous>  dextrose 5%. 1000 milliLiter(s) (100 mL/Hr) IV Continuous <Continuous>  dextrose 50% Injectable 25 Gram(s) IV Push once  dextrose 50% Injectable 12.5 Gram(s) IV Push once  dextrose 50% Injectable 25 Gram(s) IV Push once  enoxaparin Injectable 40 milliGRAM(s) SubCutaneous every 24 hours  glucagon  Injectable 1 milliGRAM(s) IntraMuscular once  insulin glargine Injectable (LANTUS) 5 Unit(s) SubCutaneous once  insulin lispro (ADMELOG) corrective regimen sliding scale   SubCutaneous three times a day before meals  insulin lispro Injectable (ADMELOG) 3 Unit(s) SubCutaneous three times a day before meals  meropenem  IVPB 1000 milliGRAM(s) IV Intermittent every 8 hours  sodium chloride 0.9%. 1000 milliLiter(s) (50 mL/Hr) IV Continuous <Continuous>  vancomycin  IVPB 1250 milliGRAM(s) IV Intermittent every 12 hours    MEDICATIONS  (PRN):  acetaminophen     Tablet .. 650 milliGRAM(s) Oral every 6 hours PRN Temp greater or equal to 38.5C (101.3F)  dextrose Oral Gel 15 Gram(s) Oral once PRN Blood Glucose LESS THAN 70 milliGRAM(s)/deciliter  oxycodone    5 mG/acetaminophen 325 mG 1 Tablet(s) Oral every 6 hours PRN Severe Pain (7 - 10)      CAPILLARY BLOOD GLUCOSE      POCT Blood Glucose.: 172 mg/dL (2022 11:15)  POCT Blood Glucose.: 300 mg/dL (2022 07:32)  POCT Blood Glucose.: 162 mg/dL (2022 23:54)  POCT Blood Glucose.: 170 mg/dL (2022 16:36)    I&O's Summary      PHYSICAL EXAM:  Vital Signs Last 24 Hrs  T(C): 36.7 (2022 10:34), Max: 36.8 (2022 04:55)  T(F): 98.1 (2022 10:34), Max: 98.3 (2022 04:55)  HR: 67 (2022 10:34) (65 - 67)  BP: 116/74 (2022 10:34) (116/74 - 130/79)  BP(mean): --  RR: 18 (2022 10:34) (18 - 18)  SpO2: 94% (2022 10:34) (94% - 97%)    Parameters below as of 2022 10:34  Patient On (Oxygen Delivery Method): room air        CONSTITUTIONAL: NAD,  EYES: PERRLA; conjunctiva and sclera clear  ENMT: Moist oral mucosa,   RESPIRATORY: Normal respiratory effort; lungs are clear to auscultation bilaterally  CARDIOVASCULAR: Regular rate and rhythm, normal S1 and S2, no murmur   EXTS: No lower extremity edema; Peripheral pulses are 2+ bilaterally rt foot dressing present,tender  ABDOMEN: Nontender to palpation, normoactive bowel sounds, no rebound/guarding;   MUSCLOSKELETAL:   no cyanosis of digits;   PSYCH: affect appropriate  NEUROLOGY: A+O to person, place, and time; CN 2-12 are intact and symmetric; no gross sensory/motor deficits;       LABS:                        13.2   7.84  )-----------( 217      ( 2022 07:07 )             36.9     07-19    135  |  100  |  12.4  ----------------------------<  202<H>  4.3   |  24.0  |  1.06    Ca    8.8      2022 07:07  Mg     1.9     07-17    TPro  7.4  /  Alb  3.8  /  TBili  0.8  /  DBili  x   /  AST  11  /  ALT  10  /  AlkPhos  79  07-17    PT/INR - ( 2022 13:49 )   PT: 13.1 sec;   INR: 1.13 ratio         PTT - ( 2022 13:49 )  PTT:26.8 sec      Urinalysis Basic - ( 2022 14:56 )    Color: Yellow / Appearance: Clear / S.020 / pH: x  Gluc: x / Ketone: Trace  / Bili: Negative / Urobili: Negative mg/dL   Blood: x / Protein: 30 mg/dL / Nitrite: Negative   Leuk Esterase: Negative / RBC: 0-2 /HPF / WBC 0-2 /HPF   Sq Epi: x / Non Sq Epi: Occasional / Bacteria: Occasional        Culture - Urine (collected 2022 21:59)  Source: Clean Catch Clean Catch (Midstream)  Final Report (2022 10:44):    <10,000 CFU/mL Normal Urogenital Camila    Culture - Blood (collected 2022 21:54)  Source: .Blood Blood-Peripheral  Preliminary Report (2022 22:02):    No growth to date.    Culture - Blood (collected 2022 21:54)  Source: .Blood Blood-Peripheral  Preliminary Report (2022 22:02):    No growth to date.        RADIOLOGY & ADDITIONAL TESTS:  Results Reviewed:   < from: MR Foot No Cont, Right (22 @ 01:44) >    1.  Skin wound at the medial lateral forefoot with soft tissue swelling   concerning for cellulitis. No drainable fluid collection.  2.  Abnormal marrow signal within the second and fourth rays as detailed   above. Given the adjacent skin wound, findings are concerning for   osteomyelitis.  3.  Abnormal marrow signal within the navicula and medial cuneiform. No   definite adjacent skin wound. The findings likely reflect stress   reaction. Correlate clinically for skin wound which would increase the   probability of osteomyelitis in these regions.  4.  Pathologic fracture at the base of the second proximal phalanx.    < end of copied text >

## 2022-07-20 ENCOUNTER — RESULT REVIEW (OUTPATIENT)
Age: 57
End: 2022-07-20

## 2022-07-20 ENCOUNTER — TRANSCRIPTION ENCOUNTER (OUTPATIENT)
Age: 57
End: 2022-07-20

## 2022-07-20 LAB
ANION GAP SERPL CALC-SCNC: 10 MMOL/L — SIGNIFICANT CHANGE UP (ref 5–17)
BUN SERPL-MCNC: 15.5 MG/DL — SIGNIFICANT CHANGE UP (ref 8–20)
CALCIUM SERPL-MCNC: 8.7 MG/DL — SIGNIFICANT CHANGE UP (ref 8.6–10.2)
CHLORIDE SERPL-SCNC: 104 MMOL/L — SIGNIFICANT CHANGE UP (ref 98–107)
CO2 SERPL-SCNC: 23 MMOL/L — SIGNIFICANT CHANGE UP (ref 22–29)
CREAT SERPL-MCNC: 1 MG/DL — SIGNIFICANT CHANGE UP (ref 0.5–1.3)
EGFR: 89 ML/MIN/1.73M2 — SIGNIFICANT CHANGE UP
GLUCOSE BLDC GLUCOMTR-MCNC: 152 MG/DL — HIGH (ref 70–99)
GLUCOSE BLDC GLUCOMTR-MCNC: 286 MG/DL — HIGH (ref 70–99)
GLUCOSE BLDC GLUCOMTR-MCNC: 301 MG/DL — HIGH (ref 70–99)
GLUCOSE SERPL-MCNC: 149 MG/DL — HIGH (ref 70–99)
GRAM STN FLD: SIGNIFICANT CHANGE UP
HCT VFR BLD CALC: 36.7 % — LOW (ref 39–50)
HGB BLD-MCNC: 13.1 G/DL — SIGNIFICANT CHANGE UP (ref 13–17)
MCHC RBC-ENTMCNC: 30.3 PG — SIGNIFICANT CHANGE UP (ref 27–34)
MCHC RBC-ENTMCNC: 35.7 GM/DL — SIGNIFICANT CHANGE UP (ref 32–36)
MCV RBC AUTO: 85 FL — SIGNIFICANT CHANGE UP (ref 80–100)
PLATELET # BLD AUTO: 227 K/UL — SIGNIFICANT CHANGE UP (ref 150–400)
POTASSIUM SERPL-MCNC: 4 MMOL/L — SIGNIFICANT CHANGE UP (ref 3.5–5.3)
POTASSIUM SERPL-SCNC: 4 MMOL/L — SIGNIFICANT CHANGE UP (ref 3.5–5.3)
RBC # BLD: 4.32 M/UL — SIGNIFICANT CHANGE UP (ref 4.2–5.8)
RBC # FLD: 11.9 % — SIGNIFICANT CHANGE UP (ref 10.3–14.5)
SODIUM SERPL-SCNC: 137 MMOL/L — SIGNIFICANT CHANGE UP (ref 135–145)
SPECIMEN SOURCE: SIGNIFICANT CHANGE UP
VANCOMYCIN TROUGH SERPL-MCNC: 12.3 UG/ML — SIGNIFICANT CHANGE UP (ref 10–20)
WBC # BLD: 7 K/UL — SIGNIFICANT CHANGE UP (ref 3.8–10.5)
WBC # FLD AUTO: 7 K/UL — SIGNIFICANT CHANGE UP (ref 3.8–10.5)

## 2022-07-20 PROCEDURE — 88305 TISSUE EXAM BY PATHOLOGIST: CPT | Mod: 26

## 2022-07-20 PROCEDURE — 88311 DECALCIFY TISSUE: CPT | Mod: 26

## 2022-07-20 PROCEDURE — 99233 SBSQ HOSP IP/OBS HIGH 50: CPT

## 2022-07-20 PROCEDURE — 88307 TISSUE EXAM BY PATHOLOGIST: CPT | Mod: 26

## 2022-07-20 PROCEDURE — 99232 SBSQ HOSP IP/OBS MODERATE 35: CPT

## 2022-07-20 PROCEDURE — 73630 X-RAY EXAM OF FOOT: CPT | Mod: 26,RT

## 2022-07-20 RX ORDER — SODIUM CHLORIDE 9 MG/ML
1000 INJECTION, SOLUTION INTRAVENOUS
Refills: 0 | Status: DISCONTINUED | OUTPATIENT
Start: 2022-07-20 | End: 2022-07-22

## 2022-07-20 RX ORDER — INSULIN LISPRO 100/ML
5 VIAL (ML) SUBCUTANEOUS
Refills: 0 | Status: DISCONTINUED | OUTPATIENT
Start: 2022-07-20 | End: 2022-07-22

## 2022-07-20 RX ORDER — MEROPENEM 1 G/30ML
1000 INJECTION INTRAVENOUS EVERY 8 HOURS
Refills: 0 | Status: DISCONTINUED | OUTPATIENT
Start: 2022-07-20 | End: 2022-07-22

## 2022-07-20 RX ORDER — OXYCODONE AND ACETAMINOPHEN 5; 325 MG/1; MG/1
1 TABLET ORAL EVERY 6 HOURS
Refills: 0 | Status: DISCONTINUED | OUTPATIENT
Start: 2022-07-20 | End: 2022-07-22

## 2022-07-20 RX ORDER — DEXTROSE 50 % IN WATER 50 %
12.5 SYRINGE (ML) INTRAVENOUS ONCE
Refills: 0 | Status: DISCONTINUED | OUTPATIENT
Start: 2022-07-20 | End: 2022-07-22

## 2022-07-20 RX ORDER — DEXTROSE 50 % IN WATER 50 %
25 SYRINGE (ML) INTRAVENOUS ONCE
Refills: 0 | Status: DISCONTINUED | OUTPATIENT
Start: 2022-07-20 | End: 2022-07-22

## 2022-07-20 RX ORDER — MEROPENEM 1 G/30ML
1000 INJECTION INTRAVENOUS EVERY 8 HOURS
Refills: 0 | Status: DISCONTINUED | OUTPATIENT
Start: 2022-07-20 | End: 2022-07-20

## 2022-07-20 RX ORDER — FENTANYL CITRATE 50 UG/ML
50 INJECTION INTRAVENOUS
Refills: 0 | Status: DISCONTINUED | OUTPATIENT
Start: 2022-07-20 | End: 2022-07-22

## 2022-07-20 RX ORDER — ENOXAPARIN SODIUM 100 MG/ML
40 INJECTION SUBCUTANEOUS EVERY 24 HOURS
Refills: 0 | Status: DISCONTINUED | OUTPATIENT
Start: 2022-07-20 | End: 2022-07-22

## 2022-07-20 RX ORDER — ONDANSETRON 8 MG/1
4 TABLET, FILM COATED ORAL ONCE
Refills: 0 | Status: DISCONTINUED | OUTPATIENT
Start: 2022-07-20 | End: 2022-07-22

## 2022-07-20 RX ORDER — GLUCAGON INJECTION, SOLUTION 0.5 MG/.1ML
1 INJECTION, SOLUTION SUBCUTANEOUS ONCE
Refills: 0 | Status: DISCONTINUED | OUTPATIENT
Start: 2022-07-20 | End: 2022-07-22

## 2022-07-20 RX ORDER — INSULIN LISPRO 100/ML
VIAL (ML) SUBCUTANEOUS
Refills: 0 | Status: DISCONTINUED | OUTPATIENT
Start: 2022-07-20 | End: 2022-07-22

## 2022-07-20 RX ORDER — VANCOMYCIN HCL 1 G
1250 VIAL (EA) INTRAVENOUS EVERY 12 HOURS
Refills: 0 | Status: DISCONTINUED | OUTPATIENT
Start: 2022-07-20 | End: 2022-07-22

## 2022-07-20 RX ORDER — DEXTROSE 50 % IN WATER 50 %
15 SYRINGE (ML) INTRAVENOUS ONCE
Refills: 0 | Status: DISCONTINUED | OUTPATIENT
Start: 2022-07-20 | End: 2022-07-22

## 2022-07-20 RX ORDER — ACETAMINOPHEN 500 MG
650 TABLET ORAL EVERY 6 HOURS
Refills: 0 | Status: DISCONTINUED | OUTPATIENT
Start: 2022-07-20 | End: 2022-07-22

## 2022-07-20 RX ORDER — INSULIN LISPRO 100/ML
VIAL (ML) SUBCUTANEOUS
Refills: 0 | Status: DISCONTINUED | OUTPATIENT
Start: 2022-07-20 | End: 2022-07-20

## 2022-07-20 RX ORDER — SODIUM CHLORIDE 9 MG/ML
1000 INJECTION INTRAMUSCULAR; INTRAVENOUS; SUBCUTANEOUS
Refills: 0 | Status: DISCONTINUED | OUTPATIENT
Start: 2022-07-20 | End: 2022-07-22

## 2022-07-20 RX ADMIN — SODIUM CHLORIDE 50 MILLILITER(S): 9 INJECTION INTRAMUSCULAR; INTRAVENOUS; SUBCUTANEOUS at 12:29

## 2022-07-20 RX ADMIN — MEROPENEM 100 MILLIGRAM(S): 1 INJECTION INTRAVENOUS at 14:14

## 2022-07-20 RX ADMIN — Medication 5 UNIT(S): at 11:38

## 2022-07-20 RX ADMIN — Medication 8: at 16:41

## 2022-07-20 RX ADMIN — Medication 6: at 11:35

## 2022-07-20 RX ADMIN — MEROPENEM 100 MILLIGRAM(S): 1 INJECTION INTRAVENOUS at 21:41

## 2022-07-20 RX ADMIN — Medication 5 UNIT(S): at 16:41

## 2022-07-20 RX ADMIN — MEROPENEM 100 MILLIGRAM(S): 1 INJECTION INTRAVENOUS at 05:49

## 2022-07-20 RX ADMIN — Medication 166.67 MILLIGRAM(S): at 05:49

## 2022-07-20 RX ADMIN — Medication 166.67 MILLIGRAM(S): at 17:16

## 2022-07-20 NOTE — CHART NOTE - NSCHARTNOTEFT_GEN_A_CORE
Reviewed MRI R foot. Pt going for R 4th partial ray resection 7/20 at 730am. Requesting medical optimization. Requesting all preop labs including type and screen. Pt to be NPO after midnight 7/19. COVID negative 7/17. Please page podiatry for any further questions.
PT s/p R 4th partial ray amputation with removal of all nonviable skin, soft tissue and bone 7/20 with Dr. Zendejas. Surgery went well with minimal blood loss. Operative findings include purulence drainage to right foot wound with osteomyelitis to right 4th metatarsal. Clean margin of prox 4th metatarsal sent for bone cx and pathology. Pt received 2cc dexamethasone and 8cc Marcaine post op for post op pain. Pt will need PICC with 6 wks abx prior to discharge for treatment of osteomyelitis R foot. Pt to wb as tolerated to R foot in surgical shoe. Pt to keep dressing clean, dry and intact R foot. Please page podiatry for any further questions.

## 2022-07-20 NOTE — PROGRESS NOTE ADULT - SUBJECTIVE AND OBJECTIVE BOX
Patient is a 56y old  Male who presents with a chief complaint of diabetic foot infection (20 Jul 2022 08:36)      C/O Mild pain rt foot. denies cp,sob,fever,chill,n/v  REVIEW OF SYSTEMS: All systems are reviewed and found to be negative except above    MEDICATIONS  (STANDING):  dextrose 5%. 1000 milliLiter(s) (100 mL/Hr) IV Continuous <Continuous>  dextrose 5%. 1000 milliLiter(s) (50 mL/Hr) IV Continuous <Continuous>  dextrose 5%. 1000 milliLiter(s) (100 mL/Hr) IV Continuous <Continuous>  dextrose 50% Injectable 25 Gram(s) IV Push once  dextrose 50% Injectable 12.5 Gram(s) IV Push once  dextrose 50% Injectable 25 Gram(s) IV Push once  dextrose 50% Injectable 12.5 Gram(s) IV Push once  enoxaparin Injectable 40 milliGRAM(s) SubCutaneous every 24 hours  glucagon  Injectable 1 milliGRAM(s) IntraMuscular once  insulin lispro (ADMELOG) corrective regimen sliding scale   SubCutaneous three times a day before meals  insulin lispro (ADMELOG) corrective regimen sliding scale   SubCutaneous three times a day before meals  insulin lispro Injectable (ADMELOG) 5 Unit(s) SubCutaneous three times a day before meals  lactated ringers. 1000 milliLiter(s) (75 mL/Hr) IV Continuous <Continuous>  meropenem  IVPB 1000 milliGRAM(s) IV Intermittent every 8 hours  sodium chloride 0.9%. 1000 milliLiter(s) (50 mL/Hr) IV Continuous <Continuous>  vancomycin  IVPB 1250 milliGRAM(s) IV Intermittent every 12 hours    MEDICATIONS  (PRN):  acetaminophen     Tablet .. 650 milliGRAM(s) Oral every 6 hours PRN Temp greater or equal to 38.5C (101.3F)  dextrose Oral Gel 15 Gram(s) Oral once PRN Blood Glucose LESS THAN 70 milliGRAM(s)/deciliter  dextrose Oral Gel 15 Gram(s) Oral once PRN Blood Glucose LESS THAN 70 milliGRAM(s)/deciliter  fentaNYL    Injectable 50 MICROGram(s) IV Push every 5 minutes PRN Severe Pain (7 - 10)  ondansetron Injectable 4 milliGRAM(s) IV Push once PRN Nausea and/or Vomiting  oxycodone    5 mG/acetaminophen 325 mG 1 Tablet(s) Oral every 6 hours PRN Severe Pain (7 - 10)  oxycodone    5 mG/acetaminophen 325 mG 1 Tablet(s) Oral every 6 hours PRN Severe Pain (7 - 10)      CAPILLARY BLOOD GLUCOSE      POCT Blood Glucose.: 286 mg/dL (20 Jul 2022 11:31)  POCT Blood Glucose.: 152 mg/dL (20 Jul 2022 06:34)  POCT Blood Glucose.: 146 mg/dL (19 Jul 2022 23:49)  POCT Blood Glucose.: 208 mg/dL (19 Jul 2022 16:21)  POCT Blood Glucose.: 171 mg/dL (19 Jul 2022 13:24)    I&O's Summary      PHYSICAL EXAM:  Vital Signs Last 24 Hrs  T(C): 36.5 (20 Jul 2022 10:20), Max: 36.8 (20 Jul 2022 04:13)  T(F): 97.7 (20 Jul 2022 10:20), Max: 98.3 (20 Jul 2022 04:13)  HR: 63 (20 Jul 2022 10:20) (58 - 73)  BP: 145/79 (20 Jul 2022 10:20) (100/67 - 146/90)  BP(mean): --  RR: 18 (20 Jul 2022 10:20) (10 - 18)  SpO2: 97% (20 Jul 2022 10:20) (94% - 99%)    Parameters below as of 20 Jul 2022 10:20  Patient On (Oxygen Delivery Method): room air        CONSTITUTIONAL: NAD,  EYES: PERRLA; conjunctiva and sclera clear  ENMT: Moist oral mucosa,   RESPIRATORY: Normal respiratory effort; lungs are clear to auscultation bilaterally  CARDIOVASCULAR: Regular rate and rhythm, normal S1 and S2, no murmur   EXTS: No lower extremity edema; Peripheral pulses are 2+ bilaterally  ABDOMEN: Nontender to palpation, normoactive bowel sounds, no rebound/guarding;   MUSCLOSKELETAL:  rt foot: bandage present, mild tender, PPP  PSYCH: affect appropriate  NEUROLOGY: A+O to person, place, and time; CN 2-12 are intact and symmetric; no gross sensory deficits;       LABS:                        13.1   7.00  )-----------( 227      ( 20 Jul 2022 01:45 )             36.7     07-20    137  |  104  |  15.5  ----------------------------<  149<H>  4.0   |  23.0  |  1.00    Ca    8.7      20 Jul 2022 01:45                Culture - Urine (collected 17 Jul 2022 21:59)  Source: Clean Catch Clean Catch (Midstream)  Final Report (19 Jul 2022 10:44):    <10,000 CFU/mL Normal Urogenital Camila    Culture - Blood (collected 17 Jul 2022 21:54)  Source: .Blood Blood-Peripheral  Preliminary Report (18 Jul 2022 22:02):    No growth to date.    Culture - Blood (collected 17 Jul 2022 21:54)  Source: .Blood Blood-Peripheral  Preliminary Report (18 Jul 2022 22:02):    No growth to date.        RADIOLOGY & ADDITIONAL TESTS:  Results Reviewed:   Imaging Personally Reviewed:  Electrocardiogram Personally Reviewed:    COORDINATION OF CARE:  Care Discussed with Consultants/Other Providers [Y/N]:  Prior or Outpatient Records Reviewed [Y/N]:   Patient is a 56y old  Male who presents with a chief complaint of diabetic foot infection (20 Jul 2022 08:36)      pt denies any  pain rt foot. denies cp,sob,fever,chill,n/v  REVIEW OF SYSTEMS: All systems are reviewed and found to be negative except above    MEDICATIONS  (STANDING):  dextrose 5%. 1000 milliLiter(s) (100 mL/Hr) IV Continuous <Continuous>  dextrose 5%. 1000 milliLiter(s) (50 mL/Hr) IV Continuous <Continuous>  dextrose 5%. 1000 milliLiter(s) (100 mL/Hr) IV Continuous <Continuous>  dextrose 50% Injectable 25 Gram(s) IV Push once  dextrose 50% Injectable 12.5 Gram(s) IV Push once  dextrose 50% Injectable 25 Gram(s) IV Push once  dextrose 50% Injectable 12.5 Gram(s) IV Push once  enoxaparin Injectable 40 milliGRAM(s) SubCutaneous every 24 hours  glucagon  Injectable 1 milliGRAM(s) IntraMuscular once  insulin lispro (ADMELOG) corrective regimen sliding scale   SubCutaneous three times a day before meals  insulin lispro (ADMELOG) corrective regimen sliding scale   SubCutaneous three times a day before meals  insulin lispro Injectable (ADMELOG) 5 Unit(s) SubCutaneous three times a day before meals  lactated ringers. 1000 milliLiter(s) (75 mL/Hr) IV Continuous <Continuous>  meropenem  IVPB 1000 milliGRAM(s) IV Intermittent every 8 hours  sodium chloride 0.9%. 1000 milliLiter(s) (50 mL/Hr) IV Continuous <Continuous>  vancomycin  IVPB 1250 milliGRAM(s) IV Intermittent every 12 hours    MEDICATIONS  (PRN):  acetaminophen     Tablet .. 650 milliGRAM(s) Oral every 6 hours PRN Temp greater or equal to 38.5C (101.3F)  dextrose Oral Gel 15 Gram(s) Oral once PRN Blood Glucose LESS THAN 70 milliGRAM(s)/deciliter  dextrose Oral Gel 15 Gram(s) Oral once PRN Blood Glucose LESS THAN 70 milliGRAM(s)/deciliter  fentaNYL    Injectable 50 MICROGram(s) IV Push every 5 minutes PRN Severe Pain (7 - 10)  ondansetron Injectable 4 milliGRAM(s) IV Push once PRN Nausea and/or Vomiting  oxycodone    5 mG/acetaminophen 325 mG 1 Tablet(s) Oral every 6 hours PRN Severe Pain (7 - 10)  oxycodone    5 mG/acetaminophen 325 mG 1 Tablet(s) Oral every 6 hours PRN Severe Pain (7 - 10)      CAPILLARY BLOOD GLUCOSE      POCT Blood Glucose.: 286 mg/dL (20 Jul 2022 11:31)  POCT Blood Glucose.: 152 mg/dL (20 Jul 2022 06:34)  POCT Blood Glucose.: 146 mg/dL (19 Jul 2022 23:49)  POCT Blood Glucose.: 208 mg/dL (19 Jul 2022 16:21)  POCT Blood Glucose.: 171 mg/dL (19 Jul 2022 13:24)    I&O's Summary      PHYSICAL EXAM:  Vital Signs Last 24 Hrs  T(C): 36.5 (20 Jul 2022 10:20), Max: 36.8 (20 Jul 2022 04:13)  T(F): 97.7 (20 Jul 2022 10:20), Max: 98.3 (20 Jul 2022 04:13)  HR: 63 (20 Jul 2022 10:20) (58 - 73)  BP: 145/79 (20 Jul 2022 10:20) (100/67 - 146/90)  BP(mean): --  RR: 18 (20 Jul 2022 10:20) (10 - 18)  SpO2: 97% (20 Jul 2022 10:20) (94% - 99%)    Parameters below as of 20 Jul 2022 10:20  Patient On (Oxygen Delivery Method): room air        CONSTITUTIONAL: NAD,  EYES: PERRLA; conjunctiva and sclera clear  ENMT: Moist oral mucosa,   RESPIRATORY: Normal respiratory effort; lungs are clear to auscultation bilaterally  CARDIOVASCULAR: Regular rate and rhythm, normal S1 and S2, no murmur   EXTS: No lower extremity edema; Peripheral pulses are 2+ bilaterally  ABDOMEN: Nontender to palpation, normoactive bowel sounds, no rebound/guarding;   MUSCLOSKELETAL:  rt foot: bandage present, mild tender, PPP  PSYCH: affect appropriate  NEUROLOGY: A+O to person, place, and time; CN 2-12 are intact and symmetric; no gross sensory deficits;       LABS:                        13.1   7.00  )-----------( 227      ( 20 Jul 2022 01:45 )             36.7     07-20    137  |  104  |  15.5  ----------------------------<  149<H>  4.0   |  23.0  |  1.00    Ca    8.7      20 Jul 2022 01:45                Culture - Urine (collected 17 Jul 2022 21:59)  Source: Clean Catch Clean Catch (Midstream)  Final Report (19 Jul 2022 10:44):    <10,000 CFU/mL Normal Urogenital Camila    Culture - Blood (collected 17 Jul 2022 21:54)  Source: .Blood Blood-Peripheral  Preliminary Report (18 Jul 2022 22:02):    No growth to date.    Culture - Blood (collected 17 Jul 2022 21:54)  Source: .Blood Blood-Peripheral  Preliminary Report (18 Jul 2022 22:02):    No growth to date.        RADIOLOGY & ADDITIONAL TESTS:  Results Reviewed:   Imaging Personally Reviewed:  Electrocardiogram Personally Reviewed:    COORDINATION OF CARE:  Care Discussed with Consultants/Other Providers [Y/N]:  Prior or Outpatient Records Reviewed [Y/N]:

## 2022-07-20 NOTE — PROGRESS NOTE ADULT - SUBJECTIVE AND OBJECTIVE BOX
[facility]                                                INFECTIOUS DISEASES  =======================================================                               James Esteves MD#  Sidney Law MD*                                     Selwyn Callejas MD*    Kavita Reynolds MD*            Diplomates American Board of Internal Medicine & Infectious Diseases                  # [place] Office - Appt - Tel  [phone] Fax [phone]                * [place] Office - Appt - Tel [phone] Fax [phone]                                  Hospital Consult line:  [phone]  =======================================================      [id number]  ZEINAB SCHNEIDER   follow up for: OM  s/p rt 4t toe partial 4th ray amputation 7/20/22  feels ok just sleepy  patient seen and examined.       I have personally reviewed the labs and data; pertinent labs and data are listed in this note; please see below.   ===================================================  REVIEW OF SYSTEMS:  CONSTITUTIONAL:  No Fever or chills  HEENT:  No diplopia or blurred vision.  No earache, sore throat or runny nose.  CARDIOVASCULAR:  No pressure, squeezing, strangling, tightness, heaviness or aching about the chest, neck, axilla or epigastrium.  RESPIRATORY:  No cough, shortness of breath  GASTROINTESTINAL:  No nausea, vomiting or diarrhea.  GENITOURINARY:  No dysuria, frequency or urgency. No Blood in urine  MUSCULOSKELETAL:  no joint aches, no muscle pain  SKIN:  No change in skin, hair or nails.  NEUROLOGIC:  No Headaches, seizures or weakness.  PSYCHIATRIC:  No disorder of thought or mood.  ENDOCRINE:  No heat or cold intolerance  HEMATOLOGICAL:  No easy bruising or bleeding.    =======================================================  Allergies    penicillin (Anaphylaxis)  sulfa drugs (Other)    Intolerances    Keflex (Other)  Antibiotics:  meropenem  IVPB 1000 milliGRAM(s) IV Intermittent every 8 hours  vancomycin  IVPB 1250 milliGRAM(s) IV Intermittent every 12 hours    Other medications:  dextrose 5%. 1000 milliLiter(s) IV Continuous <Continuous>  dextrose 5%. 1000 milliLiter(s) IV Continuous <Continuous>  dextrose 5%. 1000 milliLiter(s) IV Continuous <Continuous>  dextrose 50% Injectable 25 Gram(s) IV Push once  dextrose 50% Injectable 12.5 Gram(s) IV Push once  dextrose 50% Injectable 25 Gram(s) IV Push once  dextrose 50% Injectable 12.5 Gram(s) IV Push once  enoxaparin Injectable 40 milliGRAM(s) SubCutaneous every 24 hours  glucagon  Injectable 1 milliGRAM(s) IntraMuscular once  insulin lispro (ADMELOG) corrective regimen sliding scale   SubCutaneous three times a day before meals  insulin lispro Injectable (ADMELOG) 5 Unit(s) SubCutaneous three times a day before meals  lactated ringers. 1000 milliLiter(s) IV Continuous <Continuous>  sodium chloride 0.9%. 1000 milliLiter(s) IV Continuous <Continuous>    ======================================================  Physical Exam:  ============  T(F): 98.2 (20 Jul 2022 16:30), Max: 98.3 (20 Jul 2022 04:13)  HR: 67 (20 Jul 2022 16:30)  BP: 120/71 (20 Jul 2022 16:30)  RR: 18 (20 Jul 2022 16:30)  SpO2: 97% (20 Jul 2022 16:30) (94% - 99%)  temp max in last 48H T(F): , Max: 98.3 (07-19-22 @ 04:55)    General:  No acute distress.  Eye: Pupils are equal, round and reactive to light, Extraocular movements are intact, Normal conjunctiva.  HENT: Normocephalic, Oral mucosa is moist, No pharyngeal erythema, No sinus tenderness.  Neck: Supple, No lymphadenopathy.  Respiratory: Lungs are clear to auscultation, Respirations are non-labored.  Cardiovascular: Normal rate, Regular rhythm,    Gastrointestinal: Soft, Non-tender, Non-distended, Normal bowel sounds.  Genitourinary: No costovertebral angle tenderness.  Lymphatics: No lymphadenopathy neck,   Musculoskeletal: Normal range of motion, Normal strength. RLE dressing intact  Integumentary: No rash.  Neurologic: Alert, Oriented, No focal deficits, Cranial Nerves II-XII are grossly intact.  Psychiatric: Appropriate mood & affect.  =======================================================  Labs:                        13.1   7.00  )-----------( 227      ( 20 Jul 2022 01:45 )             36.7     07-20    137  |  104  |  15.5  ----------------------------<  149<H>  4.0   |  23.0  |  1.00    Ca    8.7      20 Jul 2022 01:45        Culture - Urine (collected 07-17-22 @ 21:59)  Source: Clean Catch Clean Catch (Midstream)  Final Report (07-19-22 @ 10:44):    <10,000 CFU/mL Normal Urogenital Camila    Culture - Blood (collected 07-17-22 @ 21:54)  Source: .Blood Blood-Peripheral    Culture - Blood (collected 07-17-22 @ 21:54)  Source: .Blood Blood-Peripheral      < from: MR Foot No Cont, Right (07.19.22 @ 01:44) >  IMPRESSION:    1.  Skin wound at the medial lateral forefoot with soft tissue swelling   concerning for cellulitis. No drainable fluid collection.  2.  Abnormal marrow signal within the second and fourth rays as detailed   above. Given the adjacent skin wound, findings are concerning for   osteomyelitis.  3.  Abnormal marrow signal within the navicula and medial cuneiform. No   definite adjacent skin wound. The findings likely reflect stress   reaction. Correlate clinically for skin wound which would increase the   probability of osteomyelitis in these regions.  4.  Pathologic fracture at the base of the second proximal phalanx.    < end of copied text >

## 2022-07-20 NOTE — PROGRESS NOTE ADULT - SUBJECTIVE AND OBJECTIVE BOX
pt sp 4th metatarsal head resection with bone biopsy and drainage of abscess  4th metatarsal head sent for sampling   culture taken, deep wound swab  clean bone cultures taken from proximal metatarsal head   pt will likely require 6 weeks IV abx, appreciate ID recs    do not change dressing for 24 hours  do not get wet  elevate  rest  surgical shoe  podiatry will change bandage 7/21/22

## 2022-07-20 NOTE — PROGRESS NOTE ADULT - ASSESSMENT
56 year old male with medical history significant for IDDM and prior diabetic foot ulcers/OM  s/p partial 5th, partial 1st ray amp on right, sp partial 1st , Skin grafting rt foot came to ed with fever 102 on Friday , suddenly the skin flap sort of opened and got red and swollen and some discharge. he is followed by Dr Jain by  podiatry and recommedns to come to ed. started iv merrem and zosyn ,seen by podiatry,ID. High esr/crp. x-ray foot pending result. mri foot order to r/o OM.        Rt foot wound cellulitis with suspected  OM S/P Partial amputation of fourth ray of right foot 07/20/22  -Continue Merrem and Vanco for broad spectrum coverage,F/U Vanco trough.  -podiatry following, F/U REC ABOUT DVT PPX,PT eval  -f/u id rec about abx, podiatry rec 6 wks abx  -MRI foot reviewed as above  -wound care  -bl c/s NGTD  -f/u cxs  -f/u id rec  -DVT PPX- Lovenox      Uncontrolled  DM With hyperglycemia  -a1c 9.4  - Lantus w/meal coverage  -[facility]  care of plan dw pt

## 2022-07-20 NOTE — PROGRESS NOTE ADULT - ASSESSMENT
56 year old male with medical history significant for IDDM and prior diabetic foot ulcers needing toe amputations in the past presented with fever, redness and swelling of RLE as well as chronic rt plantar ulcer x 2 months.    Cellulitis of right foot with plantar wound infection and osteomyelitis- skin graft also appeared involveed  s/p rt 4t toe partial 4th ray amputation, I+D abscess 7/20/22, f/u cx and path  MR as above with abnormal marrow signal 4th ray concerning for OM given + wound at this site. Also abnormal marrow signal within navicula and cuneiform, d/w podiatry this is likely also OM  c/w empiric meropenem and vancomycin  Monitor trough and adjust per pharmacy protocol.   f/u wound cx  f/u bcx  suggest PICC and 6 weeks of IV abx based on culture test results          d/w podiatry Dr Zendejas, pharmacy    will f/u

## 2022-07-20 NOTE — PROGRESS NOTE ADULT - SUBJECTIVE AND OBJECTIVE BOX
· Chief Complaint: The patient is a 56y Male complaining of wound check.  · HPI Objective Statement: 57 y/o male hx dm, chronic right foot ulcer sent by Dr. Alcazar office for iv abx and admission due to worsening foot ulcer. denies pain, no f/c, no other complaints. anaphylaxis to penicillin per pt.     	ROS: No fever/chills. No eye pain/changes in vision, No ear pain/sore throat/dysphagia, No chest pain/palpitations. No SOB/cough/. No abdominal pain, N/V/D, no black/bloody bm. No dysuria/frequency/discharge, No headache. No Dizziness. No numbness/tingling/weakness.    Podiatry HPI: Patient is a 56M PMH DM, h/o gout, sent in by Dr. Jain office for worsening R foot wound. Patient states that wound was getting worse and was seen in office over the weekend and was sent in for IV abx. Patient states he has no pain and no sensation to foot. He states that he has no other pedal complaints. Patient understands he may need amputation of the right 4th toe. Patient denies any fever, shares that on admission it was 99.5. Denies nausea, vomiting sob, chills, chest pain     Patient admits to  (-) Fevers, (-) Chills, (-) Nausea, (-) Vomiting, (-) Shortness of Breath (-) calf pain (-) chest pain     Podiatry interval HPI: Patient seen in bed, resting comfortably, Denies any acute overnight events. Denies any changes in his symptoms. Denies constitutional symptoms. PT amendable for R foot 4th partial ray amputation today 7/20 with Dr. Zendejas.     Medications acetaminophen     Tablet .. 650 milliGRAM(s) Oral every 6 hours PRN  dextrose 5%. 1000 milliLiter(s) IV Continuous <Continuous>  dextrose 5%. 1000 milliLiter(s) IV Continuous <Continuous>  dextrose 50% Injectable 25 Gram(s) IV Push once  dextrose 50% Injectable 12.5 Gram(s) IV Push once  dextrose 50% Injectable 25 Gram(s) IV Push once  dextrose Oral Gel 15 Gram(s) Oral once PRN  enoxaparin Injectable 40 milliGRAM(s) SubCutaneous every 24 hours  glucagon  Injectable 1 milliGRAM(s) IntraMuscular once  insulin lispro (ADMELOG) corrective regimen sliding scale   SubCutaneous three times a day before meals  insulin lispro Injectable (ADMELOG) 5 Unit(s) SubCutaneous three times a day before meals  meropenem  IVPB 1000 milliGRAM(s) IV Intermittent every 8 hours  oxycodone    5 mG/acetaminophen 325 mG 1 Tablet(s) Oral every 6 hours PRN  sodium chloride 0.9%. 1000 milliLiter(s) IV Continuous <Continuous>  vancomycin  IVPB 1250 milliGRAM(s) IV Intermittent every 12 hours    FHFHx: diabetes mellitus (Grandparent)    ,   PMHDiabetes mellitus    H/O diabetic neuropathy       PSHHistory of partial amputation of toe        Labs                          13.1   7.00  )-----------( 227      ( 20 Jul 2022 01:45 )             36.7      07-20    137  |  104  |  15.5  ----------------------------<  149<H>  4.0   |  23.0  |  1.00    Ca    8.7      20 Jul 2022 01:45       Vital Signs Last 24 Hrs  T(C): 36.6 (20 Jul 2022 06:26), Max: 36.8 (20 Jul 2022 04:13)  T(F): 97.9 (20 Jul 2022 06:26), Max: 98.3 (20 Jul 2022 04:13)  HR: 68 (20 Jul 2022 06:26) (58 - 73)  BP: 120/82 (20 Jul 2022 06:26) (116/74 - 146/90)  BP(mean): --  RR: 16 (20 Jul 2022 06:26) (16 - 18)  SpO2: 99% (20 Jul 2022 06:26) (94% - 99%)    Parameters below as of 20 Jul 2022 04:13  Patient On (Oxygen Delivery Method): room air      Sedimentation Rate, Erythrocyte: 48 mm/hr (07-17-22 @ 21:57)         C-Reactive Protein, Serum: 204 mg/L (07-17-22 @ 13:49)   WBC Count: 7.00 K/uL (07-20-22 @ 01:45)  WBC Count: 7.84 K/uL (07-19-22 @ 07:07)      Derm: R foot wound noted to plantar submet4 measures 1cm x 1cm x 1.0 with tunneling noted to dorsal 4th metatarsal head with opening measures 0.5cm x 0.2cm. Scant purulent drainage with positive probe to bone to 4th metatarsal head. No fluctuance no malodor, clinical signs of osteomyelitis noted to R 4th metatarsal head  Vasc: DP/PT pulses palpable 2/4 b/l. Mild edema noted to right lateral aspect foot. Skin temperature noted to be warm to cool  from proximal to distal b/l. Pedal hair growth present  Neuro: Protective and epicritic sensation grossly absent  Musc:  s/p partial 5th, partial 1st ray amp on right, sp partial 1st amp on left      xrays 7/17:  < from: MR Foot No Cont, Right (07.19.22 @ 01:44) >  ACC: [id number] EXAM:  MR FOOT RT                          PROCEDURE DATE:  07/19/2022          INTERPRETATION:  MR FOOT RIGHT dated 7/19/2022 1:44 AM    INDICATION: Pain and swelling    COMPARISON: Foot radiographs dated 7/17/2022    TECHNIQUE: Multi-sequential, multiplanar MRI imaging of the right midfoot   and forefoot was performed per standard protocol.    FINDINGS:    BONE MARROW: The patient is status post amputation of the first ray to   level of the first metatarsal head and the fifth ray to level of the   fifth metatarsal head. There are erosive changes involving the base of   the second and fourth proximal phalanges and the second and fourth   metatarsal heads. There are areas of decreased bone T1 marrow signal with   associated marrow edema within the areas of erosions. There is a   pathologic fracture at the base of the second proximal phalanx. There is   joint space narrowing and productive changes at the first through third   tarsometatarsal joints. Decreased T1 signal and increased T2 signal noted   within the base of the second metatarsal and the middle cuneiform and   navicula.  SYNOVIUM/ JOINT FLUID: No large joint effusion  TENDONS: Postsurgical changes of the first and fifth flexor and extensor   tendons. High-grade partial tearing of the flexor tendons to the second   digit.  MUSCLES: Atrophy and edema in the intrinsic musculature the foot.  NEUROVASCULAR STRUCTURES: Preserved  SUBCUTANEOUS SOFT TISSUES: There are skin wounds over the medial and   lateral forefoot. Packing material is seen within the lateral forefoot   skin wound. There is edema about the forefoot. No drainable fluid   collection is seen.    IMPRESSION:    1.  Skin wound at the medial lateral forefoot with soft tissue swelling   concerning for cellulitis. No drainable fluid collection.  2.  Abnormal marrow signal within the second and fourth rays as detailed   above. Given the adjacent skin wound, findings are concerning for   osteomyelitis.  3.  Abnormal marrow signal within the navicula and medial cuneiform. No   definite adjacent skin wound. The findings likely reflect stress   reaction. Correlate clinically for skin wound which would increase the   probability of osteomyelitis in these regions.  4.  Pathologic fracture at the base of the second proximal phalanx.    --- End of Report ---          < end of copied text >  < from: Xray Foot AP + Lateral + Oblique, Right (07.17.22 @ 14:58) >  ACC: [id number] EXAM:  XR FOOT COMP MIN 3 VIEWS RT                          PROCEDURE DATE:  07/17/2022          INTERPRETATION:  Clinical history: 86-year-old male, diabetic foot   infection.    Three views of the right foot are correlated with the MRI of 7/19/2022   which better characterizes findings concerning for osteomyelitis and   pathological fracture of the proximal phalanx of the second digit.    IMPRESSION:  Findings concerning for osteomyelitis and pathologic fracture, better   characterized on MRI      < end of copied text >          A:  Right foot 4th metatarsal Osteomyelitis   Right foot wound   DM Type II       P:  Patient evaluated and chart reviewed   Patient WBC count noted to be downtrending (13 --> 10 --> 7 )  patient afebrile  Reviewed xrays & MRI R foot - results noted above  Applied betadine DSD to the R foot area   Discussed conservative and surgical management of R 4th metatarsal head osteomyelitis   Pt aware of all options including long term iv abx vs R foot 4th metatarsal head amputation   Pt opted for surgical management including R 4th partial ray amputation   Pt to go for R 4th partial ray amputation today 7/20 at 730am with Dr. Zendejas  Appreciate medical optimization  Pt has been NPO past midnight 7/19  Covid negative 7/19  Appreciate ID reccs   Will cont to follow   Discussed and seen bedside with Dr. Zendejas

## 2022-07-21 LAB
-  AMPICILLIN/SULBACTAM: SIGNIFICANT CHANGE UP
-  CEFAZOLIN: SIGNIFICANT CHANGE UP
-  CLINDAMYCIN: SIGNIFICANT CHANGE UP
-  ERYTHROMYCIN: SIGNIFICANT CHANGE UP
-  GENTAMICIN: SIGNIFICANT CHANGE UP
-  OXACILLIN: SIGNIFICANT CHANGE UP
-  PENICILLIN: SIGNIFICANT CHANGE UP
-  RIFAMPIN: SIGNIFICANT CHANGE UP
-  TETRACYCLINE: SIGNIFICANT CHANGE UP
-  TRIMETHOPRIM/SULFAMETHOXAZOLE: SIGNIFICANT CHANGE UP
-  VANCOMYCIN: SIGNIFICANT CHANGE UP
ANION GAP SERPL CALC-SCNC: 12 MMOL/L — SIGNIFICANT CHANGE UP (ref 5–17)
BASOPHILS # BLD AUTO: 0.03 K/UL — SIGNIFICANT CHANGE UP (ref 0–0.2)
BASOPHILS NFR BLD AUTO: 0.3 % — SIGNIFICANT CHANGE UP (ref 0–2)
BUN SERPL-MCNC: 18.2 MG/DL — SIGNIFICANT CHANGE UP (ref 8–20)
CALCIUM SERPL-MCNC: 8.6 MG/DL — SIGNIFICANT CHANGE UP (ref 8.6–10.2)
CHLORIDE SERPL-SCNC: 101 MMOL/L — SIGNIFICANT CHANGE UP (ref 98–107)
CO2 SERPL-SCNC: 23 MMOL/L — SIGNIFICANT CHANGE UP (ref 22–29)
CREAT SERPL-MCNC: 0.94 MG/DL — SIGNIFICANT CHANGE UP (ref 0.5–1.3)
EGFR: 96 ML/MIN/1.73M2 — SIGNIFICANT CHANGE UP
EOSINOPHIL # BLD AUTO: 0.1 K/UL — SIGNIFICANT CHANGE UP (ref 0–0.5)
EOSINOPHIL NFR BLD AUTO: 1 % — SIGNIFICANT CHANGE UP (ref 0–6)
GLUCOSE BLDC GLUCOMTR-MCNC: 126 MG/DL — HIGH (ref 70–99)
GLUCOSE BLDC GLUCOMTR-MCNC: 179 MG/DL — HIGH (ref 70–99)
GLUCOSE BLDC GLUCOMTR-MCNC: 255 MG/DL — HIGH (ref 70–99)
GLUCOSE BLDC GLUCOMTR-MCNC: 262 MG/DL — HIGH (ref 70–99)
GLUCOSE SERPL-MCNC: 272 MG/DL — HIGH (ref 70–99)
HCT VFR BLD CALC: 37 % — LOW (ref 39–50)
HGB BLD-MCNC: 13 G/DL — SIGNIFICANT CHANGE UP (ref 13–17)
IMM GRANULOCYTES NFR BLD AUTO: 0.4 % — SIGNIFICANT CHANGE UP (ref 0–1.5)
LYMPHOCYTES # BLD AUTO: 2.3 K/UL — SIGNIFICANT CHANGE UP (ref 1–3.3)
LYMPHOCYTES # BLD AUTO: 24.1 % — SIGNIFICANT CHANGE UP (ref 13–44)
MCHC RBC-ENTMCNC: 30 PG — SIGNIFICANT CHANGE UP (ref 27–34)
MCHC RBC-ENTMCNC: 35.1 GM/DL — SIGNIFICANT CHANGE UP (ref 32–36)
MCV RBC AUTO: 85.5 FL — SIGNIFICANT CHANGE UP (ref 80–100)
METHOD TYPE: SIGNIFICANT CHANGE UP
MONOCYTES # BLD AUTO: 0.65 K/UL — SIGNIFICANT CHANGE UP (ref 0–0.9)
MONOCYTES NFR BLD AUTO: 6.8 % — SIGNIFICANT CHANGE UP (ref 2–14)
NEUTROPHILS # BLD AUTO: 6.43 K/UL — SIGNIFICANT CHANGE UP (ref 1.8–7.4)
NEUTROPHILS NFR BLD AUTO: 67.4 % — SIGNIFICANT CHANGE UP (ref 43–77)
PLATELET # BLD AUTO: 261 K/UL — SIGNIFICANT CHANGE UP (ref 150–400)
POTASSIUM SERPL-MCNC: 3.9 MMOL/L — SIGNIFICANT CHANGE UP (ref 3.5–5.3)
POTASSIUM SERPL-SCNC: 3.9 MMOL/L — SIGNIFICANT CHANGE UP (ref 3.5–5.3)
RBC # BLD: 4.33 M/UL — SIGNIFICANT CHANGE UP (ref 4.2–5.8)
RBC # FLD: 11.9 % — SIGNIFICANT CHANGE UP (ref 10.3–14.5)
SODIUM SERPL-SCNC: 136 MMOL/L — SIGNIFICANT CHANGE UP (ref 135–145)
WBC # BLD: 9.55 K/UL — SIGNIFICANT CHANGE UP (ref 3.8–10.5)
WBC # FLD AUTO: 9.55 K/UL — SIGNIFICANT CHANGE UP (ref 3.8–10.5)

## 2022-07-21 PROCEDURE — 76937 US GUIDE VASCULAR ACCESS: CPT | Mod: 26,59

## 2022-07-21 PROCEDURE — 36569 INSJ PICC 5 YR+ W/O IMAGING: CPT

## 2022-07-21 PROCEDURE — 71045 X-RAY EXAM CHEST 1 VIEW: CPT | Mod: 26

## 2022-07-21 PROCEDURE — 99232 SBSQ HOSP IP/OBS MODERATE 35: CPT

## 2022-07-21 PROCEDURE — 76942 ECHO GUIDE FOR BIOPSY: CPT | Mod: 26,59

## 2022-07-21 RX ORDER — CHLORHEXIDINE GLUCONATE 213 G/1000ML
1 SOLUTION TOPICAL
Refills: 0 | Status: DISCONTINUED | OUTPATIENT
Start: 2022-07-21 | End: 2022-07-22

## 2022-07-21 RX ORDER — SODIUM CHLORIDE 9 MG/ML
10 INJECTION INTRAMUSCULAR; INTRAVENOUS; SUBCUTANEOUS
Refills: 0 | Status: DISCONTINUED | OUTPATIENT
Start: 2022-07-21 | End: 2022-07-22

## 2022-07-21 RX ADMIN — Medication 2: at 11:40

## 2022-07-21 RX ADMIN — Medication 166.67 MILLIGRAM(S): at 17:28

## 2022-07-21 RX ADMIN — MEROPENEM 100 MILLIGRAM(S): 1 INJECTION INTRAVENOUS at 23:06

## 2022-07-21 RX ADMIN — Medication 166.67 MILLIGRAM(S): at 06:07

## 2022-07-21 RX ADMIN — ENOXAPARIN SODIUM 40 MILLIGRAM(S): 100 INJECTION SUBCUTANEOUS at 07:53

## 2022-07-21 RX ADMIN — Medication 5 UNIT(S): at 07:51

## 2022-07-21 RX ADMIN — Medication 5 UNIT(S): at 16:22

## 2022-07-21 RX ADMIN — Medication 5 UNIT(S): at 11:41

## 2022-07-21 RX ADMIN — CHLORHEXIDINE GLUCONATE 1 APPLICATION(S): 213 SOLUTION TOPICAL at 06:00

## 2022-07-21 RX ADMIN — MEROPENEM 100 MILLIGRAM(S): 1 INJECTION INTRAVENOUS at 06:06

## 2022-07-21 RX ADMIN — Medication 6: at 07:50

## 2022-07-21 RX ADMIN — MEROPENEM 100 MILLIGRAM(S): 1 INJECTION INTRAVENOUS at 13:57

## 2022-07-21 NOTE — PROGRESS NOTE ADULT - SUBJECTIVE AND OBJECTIVE BOX
· Chief Complaint: The patient is a 56y Male complaining of wound check.  · HPI Objective Statement: 55 y/o male hx dm, chronic right foot ulcer sent by Dr. Alcazar office for iv abx and admission due to worsening foot ulcer. denies pain, no f/c, no other complaints. anaphylaxis to penicillin per pt.     	ROS: No fever/chills. No eye pain/changes in vision, No ear pain/sore throat/dysphagia, No chest pain/palpitations. No SOB/cough/. No abdominal pain, N/V/D, no black/bloody bm. No dysuria/frequency/discharge, No headache. No Dizziness. No numbness/tingling/weakness.    Podiatry HPI: Patient is a 56M PMH DM, h/o gout, sent in by Dr. Jain office for worsening R foot wound. Patient states that wound was getting worse and was seen in office over the weekend and was sent in for IV abx. Patient states he has no pain and no sensation to foot. He states that he has no other pedal complaints. Patient understands he may need amputation of the right 4th toe. Patient denies any fever, shares that on admission it was 99.5. Denies nausea, vomiting sob, chills, chest pain     Patient admits to  (-) Fevers, (-) Chills, (-) Nausea, (-) Vomiting, (-) Shortness of Breath (-) calf pain (-) chest pain     Podiatry interval HPI: Patient seen in bed, resting comfortably, Denies any acute overnight events. Pt s/p R 4th partial ray amputation 7/20 with Dr. Zendejas. Denies any pain to R foot. Denies constitutional symptoms. No other pedal complaints. Pt received PICC today 7/21.       Medications acetaminophen     Tablet .. 650 milliGRAM(s) Oral every 6 hours PRN  chlorhexidine 2% Cloths 1 Application(s) Topical <User Schedule>  dextrose 5%. 1000 milliLiter(s) IV Continuous <Continuous>  dextrose 5%. 1000 milliLiter(s) IV Continuous <Continuous>  dextrose 5%. 1000 milliLiter(s) IV Continuous <Continuous>  dextrose 50% Injectable 12.5 Gram(s) IV Push once  dextrose 50% Injectable 25 Gram(s) IV Push once  dextrose 50% Injectable 12.5 Gram(s) IV Push once  dextrose 50% Injectable 25 Gram(s) IV Push once  dextrose Oral Gel 15 Gram(s) Oral once PRN  dextrose Oral Gel 15 Gram(s) Oral once PRN  enoxaparin Injectable 40 milliGRAM(s) SubCutaneous every 24 hours  fentaNYL    Injectable 50 MICROGram(s) IV Push every 5 minutes PRN  glucagon  Injectable 1 milliGRAM(s) IntraMuscular once  insulin lispro (ADMELOG) corrective regimen sliding scale   SubCutaneous three times a day before meals  insulin lispro Injectable (ADMELOG) 5 Unit(s) SubCutaneous three times a day before meals  lactated ringers. 1000 milliLiter(s) IV Continuous <Continuous>  meropenem  IVPB 1000 milliGRAM(s) IV Intermittent every 8 hours  ondansetron Injectable 4 milliGRAM(s) IV Push once PRN  oxycodone    5 mG/acetaminophen 325 mG 1 Tablet(s) Oral every 6 hours PRN  oxycodone    5 mG/acetaminophen 325 mG 1 Tablet(s) Oral every 6 hours PRN  sodium chloride 0.9% lock flush 10 milliLiter(s) IV Push every 1 hour PRN  sodium chloride 0.9%. 1000 milliLiter(s) IV Continuous <Continuous>  vancomycin  IVPB 1250 milliGRAM(s) IV Intermittent every 12 hours    FHFHx: diabetes mellitus (Grandparent)    ,   PMHDiabetes mellitus    H/O diabetic neuropathy       PSHHistory of partial amputation of toe        Labs                          13.0   9.55  )-----------( 261      ( 21 Jul 2022 08:15 )             37.0      07-21    136  |  101  |  18.2  ----------------------------<  272<H>  3.9   |  23.0  |  0.94    Ca    8.6      21 Jul 2022 08:15       Vital Signs Last 24 Hrs  T(C): 36.6 (21 Jul 2022 16:47), Max: 36.6 (21 Jul 2022 11:42)  T(F): 97.9 (21 Jul 2022 16:47), Max: 97.9 (21 Jul 2022 16:47)  HR: 62 (21 Jul 2022 16:47) (62 - 70)  BP: 132/83 (21 Jul 2022 16:47) (110/68 - 132/83)  BP(mean): --  RR: 18 (21 Jul 2022 16:47) (18 - 18)  SpO2: 97% (21 Jul 2022 16:47) (97% - 98%)    Parameters below as of 21 Jul 2022 16:47  Patient On (Oxygen Delivery Method): room air      Sedimentation Rate, Erythrocyte: 48 mm/hr (07-17-22 @ 21:57)         C-Reactive Protein, Serum: 204 mg/L (07-17-22 @ 13:49)   WBC Count: 9.55 K/uL (07-21-22 @ 08:15)      Derm: R foot wound noted to plantar submet4 measures 1cm x 1cm x 1.0. Sutures intact and noted to R dorsal foot incorporated into skin graft, no signs of wound dehiscence, no erythema or edema, no active drainage noted. No clinical signs of infection noted R foot   Vasc: DP/PT pulses palpable 2/4 b/l. No edema or erythema noted to R foot. Skin temperature noted to be warm to cool  from proximal to distal b/l. Pedal hair growth present  Neuro: Protective and epicritic sensation grossly absent  Musc:  s/p partial 4th & 5th, partial 1st ray amp on right, sp partial 1st amp on left      xrays 7/17:  < from: MR Foot No Cont, Right (07.19.22 @ 01:44) >  ACC: [id number] EXAM:  MR FOOT RT                          PROCEDURE DATE:  07/19/2022          INTERPRETATION:  MR FOOT RIGHT dated 7/19/2022 1:44 AM    INDICATION: Pain and swelling    COMPARISON: Foot radiographs dated 7/17/2022    TECHNIQUE: Multi-sequential, multiplanar MRI imaging of the right midfoot   and forefoot was performed per standard protocol.    FINDINGS:    BONE MARROW: The patient is status post amputation of the first ray to   level of the first metatarsal head and the fifth ray to level of the   fifth metatarsal head. There are erosive changes involving the base of   the second and fourth proximal phalanges and the second and fourth   metatarsal heads. There are areas of decreased bone T1 marrow signal with   associated marrow edema within the areas of erosions. There is a   pathologic fracture at the base of the second proximal phalanx. There is   joint space narrowing and productive changes at the first through third   tarsometatarsal joints. Decreased T1 signal and increased T2 signal noted   within the base of the second metatarsal and the middle cuneiform and   navicula.  SYNOVIUM/ JOINT FLUID: No large joint effusion  TENDONS: Postsurgical changes of the first and fifth flexor and extensor   tendons. High-grade partial tearing of the flexor tendons to the second   digit.  MUSCLES: Atrophy and edema in the intrinsic musculature the foot.  NEUROVASCULAR STRUCTURES: Preserved  SUBCUTANEOUS SOFT TISSUES: There are skin wounds over the medial and   lateral forefoot. Packing material is seen within the lateral forefoot   skin wound. There is edema about the forefoot. No drainable fluid   collection is seen.    IMPRESSION:    1.  Skin wound at the medial lateral forefoot with soft tissue swelling   concerning for cellulitis. No drainable fluid collection.  2.  Abnormal marrow signal within the second and fourth rays as detailed   above. Given the adjacent skin wound, findings are concerning for   osteomyelitis.  3.  Abnormal marrow signal within the navicula and medial cuneiform. No   definite adjacent skin wound. The findings likely reflect stress   reaction. Correlate clinically for skin wound which would increase the   probability of osteomyelitis in these regions.  4.  Pathologic fracture at the base of the second proximal phalanx.    --- End of Report ---          < end of copied text >  < from: Xray Foot AP + Lateral + Oblique, Right (07.17.22 @ 14:58) >  ACC: [id number] EXAM:  XR FOOT COMP MIN 3 VIEWS RT                          PROCEDURE DATE:  07/17/2022          INTERPRETATION:  Clinical history: 86-year-old male, diabetic foot   infection.    Three views of the right foot are correlated with the MRI of 7/19/2022   which better characterizes findings concerning for osteomyelitis and   pathological fracture of the proximal phalanx of the second digit.    IMPRESSION:  Findings concerning for osteomyelitis and pathologic fracture, better   characterized on MRI      < end of copied text >          A:  s/p R 4th partial ray amputation   Right foot 4th metatarsal Osteomyelitis   Right foot wound   DM Type II       P:  Patient evaluated and chart reviewed   Patient WBC count noted to be downtrending (13 --> 10 --> 7 )  patient afebrile  Reviewed xrays & MRI R foot - results noted above  Bone cx reviewed - strep B pending sensitivities   Applied xeroform, DSD ACE to post op R foot  Pt to keep dressing clean, dry and intact R foot  PT able to wb as tolerated Right foot in surgical shoe  No WCO needed upon discharge  Pt stable form podiatry standpoint  Appreciate ID reccs   Will cont to follow   F/u with Dr. Zendejas one week upon discharge  Discussed with  Dr. Zendejas and seen bedside with Dr. Jain   · Chief Complaint: The patient is a 56y Male complaining of wound check.  · HPI Objective Statement: 55 y/o male hx dm, chronic right foot ulcer sent by Dr. Alcazar office for iv abx and admission due to worsening foot ulcer. denies pain, no f/c, no other complaints. anaphylaxis to penicillin per pt.     	ROS: No fever/chills. No eye pain/changes in vision, No ear pain/sore throat/dysphagia, No chest pain/palpitations. No SOB/cough/. No abdominal pain, N/V/D, no black/bloody bm. No dysuria/frequency/discharge, No headache. No Dizziness. No numbness/tingling/weakness.    Podiatry HPI: Patient is a 56M PMH DM, h/o gout, sent in by Dr. Jain office for worsening R foot wound. Patient states that wound was getting worse and was seen in office over the weekend and was sent in for IV abx. Patient states he has no pain and no sensation to foot. He states that he has no other pedal complaints. Patient understands he may need amputation of the right 4th toe. Patient denies any fever, shares that on admission it was 99.5. Denies nausea, vomiting sob, chills, chest pain     Patient admits to  (-) Fevers, (-) Chills, (-) Nausea, (-) Vomiting, (-) Shortness of Breath (-) calf pain (-) chest pain     Podiatry interval HPI: Patient seen in bed, resting comfortably, Denies any acute overnight events. Pt s/p R 4th partial ray amputation 7/20 with Dr. Zendejas. Denies any pain to R foot. Denies constitutional symptoms. No other pedal complaints. Pt received PICC today 7/21.       Medications acetaminophen     Tablet .. 650 milliGRAM(s) Oral every 6 hours PRN  chlorhexidine 2% Cloths 1 Application(s) Topical <User Schedule>  dextrose 5%. 1000 milliLiter(s) IV Continuous <Continuous>  dextrose 5%. 1000 milliLiter(s) IV Continuous <Continuous>  dextrose 5%. 1000 milliLiter(s) IV Continuous <Continuous>  dextrose 50% Injectable 12.5 Gram(s) IV Push once  dextrose 50% Injectable 25 Gram(s) IV Push once  dextrose 50% Injectable 12.5 Gram(s) IV Push once  dextrose 50% Injectable 25 Gram(s) IV Push once  dextrose Oral Gel 15 Gram(s) Oral once PRN  dextrose Oral Gel 15 Gram(s) Oral once PRN  enoxaparin Injectable 40 milliGRAM(s) SubCutaneous every 24 hours  fentaNYL    Injectable 50 MICROGram(s) IV Push every 5 minutes PRN  glucagon  Injectable 1 milliGRAM(s) IntraMuscular once  insulin lispro (ADMELOG) corrective regimen sliding scale   SubCutaneous three times a day before meals  insulin lispro Injectable (ADMELOG) 5 Unit(s) SubCutaneous three times a day before meals  lactated ringers. 1000 milliLiter(s) IV Continuous <Continuous>  meropenem  IVPB 1000 milliGRAM(s) IV Intermittent every 8 hours  ondansetron Injectable 4 milliGRAM(s) IV Push once PRN  oxycodone    5 mG/acetaminophen 325 mG 1 Tablet(s) Oral every 6 hours PRN  oxycodone    5 mG/acetaminophen 325 mG 1 Tablet(s) Oral every 6 hours PRN  sodium chloride 0.9% lock flush 10 milliLiter(s) IV Push every 1 hour PRN  sodium chloride 0.9%. 1000 milliLiter(s) IV Continuous <Continuous>  vancomycin  IVPB 1250 milliGRAM(s) IV Intermittent every 12 hours    FHFHx: diabetes mellitus (Grandparent)    ,   PMHDiabetes mellitus    H/O diabetic neuropathy       PSHHistory of partial amputation of toe        Labs                          13.0   9.55  )-----------( 261      ( 21 Jul 2022 08:15 )             37.0      07-21    136  |  101  |  18.2  ----------------------------<  272<H>  3.9   |  23.0  |  0.94    Ca    8.6      21 Jul 2022 08:15       Vital Signs Last 24 Hrs  T(C): 36.6 (21 Jul 2022 16:47), Max: 36.6 (21 Jul 2022 11:42)  T(F): 97.9 (21 Jul 2022 16:47), Max: 97.9 (21 Jul 2022 16:47)  HR: 62 (21 Jul 2022 16:47) (62 - 70)  BP: 132/83 (21 Jul 2022 16:47) (110/68 - 132/83)  BP(mean): --  RR: 18 (21 Jul 2022 16:47) (18 - 18)  SpO2: 97% (21 Jul 2022 16:47) (97% - 98%)    Parameters below as of 21 Jul 2022 16:47  Patient On (Oxygen Delivery Method): room air      Sedimentation Rate, Erythrocyte: 48 mm/hr (07-17-22 @ 21:57)         C-Reactive Protein, Serum: 204 mg/L (07-17-22 @ 13:49)   WBC Count: 9.55 K/uL (07-21-22 @ 08:15)      Derm: R foot wound noted to plantar submet4 measures 1cm x 1cm x 1.0. Sutures intact and noted to R dorsal foot incorporated into skin graft, no signs of wound dehiscence, no erythema or edema, no active drainage noted. No clinical signs of infection noted R foot   Vasc: DP/PT pulses palpable 2/4 b/l. No edema or erythema noted to R foot. Skin temperature noted to be warm to cool  from proximal to distal b/l. Pedal hair growth present  Neuro: Protective and epicritic sensation grossly absent  Musc:  s/p partial 4th & 5th, partial 1st ray amp on right, sp partial 1st amp on left      xrays 7/17:  < from: MR Foot No Cont, Right (07.19.22 @ 01:44) >  ACC: [id number] EXAM:  MR FOOT RT                          PROCEDURE DATE:  07/19/2022          INTERPRETATION:  MR FOOT RIGHT dated 7/19/2022 1:44 AM    INDICATION: Pain and swelling    COMPARISON: Foot radiographs dated 7/17/2022    TECHNIQUE: Multi-sequential, multiplanar MRI imaging of the right midfoot   and forefoot was performed per standard protocol.    FINDINGS:    BONE MARROW: The patient is status post amputation of the first ray to   level of the first metatarsal head and the fifth ray to level of the   fifth metatarsal head. There are erosive changes involving the base of   the second and fourth proximal phalanges and the second and fourth   metatarsal heads. There are areas of decreased bone T1 marrow signal with   associated marrow edema within the areas of erosions. There is a   pathologic fracture at the base of the second proximal phalanx. There is   joint space narrowing and productive changes at the first through third   tarsometatarsal joints. Decreased T1 signal and increased T2 signal noted   within the base of the second metatarsal and the middle cuneiform and   navicula.  SYNOVIUM/ JOINT FLUID: No large joint effusion  TENDONS: Postsurgical changes of the first and fifth flexor and extensor   tendons. High-grade partial tearing of the flexor tendons to the second   digit.  MUSCLES: Atrophy and edema in the intrinsic musculature the foot.  NEUROVASCULAR STRUCTURES: Preserved  SUBCUTANEOUS SOFT TISSUES: There are skin wounds over the medial and   lateral forefoot. Packing material is seen within the lateral forefoot   skin wound. There is edema about the forefoot. No drainable fluid   collection is seen.    IMPRESSION:    1.  Skin wound at the medial lateral forefoot with soft tissue swelling   concerning for cellulitis. No drainable fluid collection.  2.  Abnormal marrow signal within the second and fourth rays as detailed   above. Given the adjacent skin wound, findings are concerning for   osteomyelitis.  3.  Abnormal marrow signal within the navicula and medial cuneiform. No   definite adjacent skin wound. The findings likely reflect stress   reaction. Correlate clinically for skin wound which would increase the   probability of osteomyelitis in these regions.  4.  Pathologic fracture at the base of the second proximal phalanx.    --- End of Report ---          < end of copied text >  < from: Xray Foot AP + Lateral + Oblique, Right (07.17.22 @ 14:58) >  ACC: [id number] EXAM:  XR FOOT COMP MIN 3 VIEWS RT                          PROCEDURE DATE:  07/17/2022          INTERPRETATION:  Clinical history: 86-year-old male, diabetic foot   infection.    Three views of the right foot are correlated with the MRI of 7/19/2022   which better characterizes findings concerning for osteomyelitis and   pathological fracture of the proximal phalanx of the second digit.    IMPRESSION:  Findings concerning for osteomyelitis and pathologic fracture, better   characterized on MRI      < end of copied text >          A:  s/p R 4th partial ray amputation   Right foot 4th metatarsal Osteomyelitis   Right foot wound   DM Type II       P:  Patient evaluated and chart reviewed   WBC within normal limits, patient afebrile  Reviewed xrays & MRI R foot - results noted above  Bone cx reviewed - strep B pending sensitivities   Applied xeroform, DSD ACE to post op R foot  Pt to keep dressing clean, dry and intact R foot  PT able to wb as tolerated Right foot in surgical shoe  No WCO needed upon discharge  Pt stable form podiatry standpoint  Appreciate ID reccs   Will cont to follow   F/u with Dr. Zendejas one week upon discharge  Discussed with  Dr. Zendejas and seen bedside with Dr. Jain

## 2022-07-21 NOTE — PROGRESS NOTE ADULT - ASSESSMENT
56 year old male with medical history significant for IDDM and prior diabetic foot ulcers/OM  s/p partial 5th, partial 1st ray amp on right, sp partial 1st , Skin grafting rt foot came to ed with fever 102 on Friday , suddenly the skin flap sort of opened and got red and swollen and some discharge. he is followed by Dr Jain by  podiatry and recommedns to come to ed. started iv merrem and zosyn ,seen by podiatry,ID. High esr/crp. x-ray foot pending result. mri foot order to r/o OM.    Rt foot wound cellulitis with suspected  OM S/P Partial amputation of fourth ray of right foot 07/20/22  -Continue Merrem and Vanco for broad spectrum coverage,F/U Vanco trough.  -podiatry following, F/U REC ABOUT DVT PPX,PT eval  -f/u id rec about abx, podiatry rec 6 wks abx  -MRI foot reviewed as above  -wound care  -bl c/s NGTD  -f/u cxs  -f/u id rec  -DVT PPX- Lovenox    Uncontrolled  DM With hyperglycemia  -a1c 9.4  - Lantus w/meal coverage  -[facility]    DISPO: anticipate DC tomorrow once culture sensitivities result

## 2022-07-21 NOTE — PROGRESS NOTE ADULT - SUBJECTIVE AND OBJECTIVE BOX
[facility]                                                INFECTIOUS DISEASES  =======================================================                               James Esteves MD#  Sidney Law MD*                                     Selwyn Callejas MD*    Kavita Reynolds MD*            Diplomates American Board of Internal Medicine & Infectious Diseases                  # [place] Office - Appt - Tel  [phone] Fax [phone]                * [place] Office - Appt - Tel [phone] Fax [phone]                                  Hospital Consult line:  [phone]  =======================================================      [id number]  ZEINAB SCHNEIDER   follow up for: OM  picc placed  eager to go home  patient seen and examined.       I have personally reviewed the labs and data; pertinent labs and data are listed in this note; please see below.   ===================================================  REVIEW OF SYSTEMS:  CONSTITUTIONAL:  No Fever or chills  HEENT:  No diplopia or blurred vision.  No earache, sore throat or runny nose.  CARDIOVASCULAR:  No pressure, squeezing, strangling, tightness, heaviness or aching about the chest, neck, axilla or epigastrium.  RESPIRATORY:  No cough, shortness of breath  GASTROINTESTINAL:  No nausea, vomiting or diarrhea.  GENITOURINARY:  No dysuria, frequency or urgency. No Blood in urine  MUSCULOSKELETAL:  no joint aches, no muscle pain  SKIN:  No change in skin, hair or nails.  NEUROLOGIC:  No Headaches, seizures or weakness.  PSYCHIATRIC:  No disorder of thought or mood.  ENDOCRINE:  No heat or cold intolerance  HEMATOLOGICAL:  No easy bruising or bleeding.    =======================================================  Allergies    penicillin (Anaphylaxis)  sulfa drugs (Other)    Intolerances    Keflex (Other)  Antibiotics:  meropenem  IVPB 1000 milliGRAM(s) IV Intermittent every 8 hours  vancomycin  IVPB 1250 milliGRAM(s) IV Intermittent every 12 hours    Other medications:  chlorhexidine 2% Cloths 1 Application(s) Topical <User Schedule>  dextrose 5%. 1000 milliLiter(s) IV Continuous <Continuous>  dextrose 5%. 1000 milliLiter(s) IV Continuous <Continuous>  dextrose 5%. 1000 milliLiter(s) IV Continuous <Continuous>  dextrose 50% Injectable 25 Gram(s) IV Push once  dextrose 50% Injectable 12.5 Gram(s) IV Push once  dextrose 50% Injectable 25 Gram(s) IV Push once  dextrose 50% Injectable 12.5 Gram(s) IV Push once  enoxaparin Injectable 40 milliGRAM(s) SubCutaneous every 24 hours  glucagon  Injectable 1 milliGRAM(s) IntraMuscular once  insulin lispro (ADMELOG) corrective regimen sliding scale   SubCutaneous three times a day before meals  insulin lispro Injectable (ADMELOG) 5 Unit(s) SubCutaneous three times a day before meals  lactated ringers. 1000 milliLiter(s) IV Continuous <Continuous>  sodium chloride 0.9%. 1000 milliLiter(s) IV Continuous <Continuous>    ======================================================  Physical Exam:  ============  T(F): 97.9 (21 Jul 2022 16:47), Max: 97.9 (21 Jul 2022 16:47)  HR: 62 (21 Jul 2022 16:47)  BP: 132/83 (21 Jul 2022 16:47)  RR: 18 (21 Jul 2022 16:47)  SpO2: 97% (21 Jul 2022 16:47) (97% - 98%)  temp max in last 48H T(F): , Max: 98.3 (07-20-22 @ 04:13)    General:  No acute distress.  Eye: Pupils are equal, round and reactive to light, Extraocular movements are intact, Normal conjunctiva.  HENT: Normocephalic, Oral mucosa is moist, No pharyngeal erythema, No sinus tenderness.  Neck: Supple, No lymphadenopathy.  Respiratory: Lungs are clear to auscultation, Respirations are non-labored.  Cardiovascular: Normal rate, Regular rhythm,    Gastrointestinal: Soft, Non-tender, Non-distended, Normal bowel sounds.  Genitourinary: No costovertebral angle tenderness.  Lymphatics: No lymphadenopathy neck,   Musculoskeletal: Normal range of motion, Normal strength. rle dressing intact  Integumentary: No rash.  Neurologic: Alert, Oriented, No focal deficits, Cranial Nerves II-XII are grossly intact.  Psychiatric: Appropriate mood & affect.  =======================================================  Labs:                        13.0   9.55  )-----------( 261      ( 21 Jul 2022 08:15 )             37.0     07-21    136  |  101  |  18.2  ----------------------------<  272<H>  3.9   |  23.0  |  0.94    Ca    8.6      21 Jul 2022 08:15        Culture - Tissue with Gram Stain (collected 07-20-22 @ 08:01)  Source: .Tissue Right Foot Osteomyelitis  Gram Stain (07-20-22 @ 22:52):    No polymorphonuclear cells seen per low power field    No organisms seen per oil power field    Culture - Surgical Swab (collected 07-20-22 @ 08:00)  Source: .Surgical Swab Deep Wound Bone    Culture - Abscess with Gram Stain (collected 07-19-22 @ 15:31)  Source: .Abscess Leg - Right  Organism: Staphylococcus aureus (07-21-22 @ 16:17)  Organism: Staphylococcus aureus (07-21-22 @ 16:17)    Sensitivities:      -  Ampicillin/Sulbactam: S <=8/4      -  Cefazolin: S <=4      -  Clindamycin: R >4      -  Erythromycin: S <=0.25      -  Gentamicin: R >8 Should not be used as monotherapy      -  Oxacillin: S 0.5 Oxacillin predicts susceptibility for dicloxacillin, methicillin, and nafcillin      -  Penicillin: R >8      -  Rifampin: S <=1 Should not be used as monotherapy      -  Tetra/Doxy: S <=1      -  Trimethoprim/Sulfamethoxazole: S <=0.5/9.5      -  Vancomycin: S 1      Method Type: ANTOINE    Culture - Urine (collected 07-17-22 @ 21:59)  Source: Clean Catch Clean Catch (Midstream)  Final Report (07-19-22 @ 10:44):    <10,000 CFU/mL Normal Urogenital Camila    Culture - Blood (collected 07-17-22 @ 21:54)  Source: .Blood Blood-Peripheral    Culture - Blood (collected 07-17-22 @ 21:54)  Source: .Blood Blood-Peripheral

## 2022-07-21 NOTE — PROGRESS NOTE ADULT - ASSESSMENT
56 year old male with medical history significant for IDDM and prior diabetic foot ulcers needing toe amputations in the past presented with fever, redness and swelling of RLE as well as chronic rt plantar ulcer x 2 months.    Cellulitis of right foot with plantar wound infection and osteomyelitis- skin graft also appeared involveed  s/p rt 4t toe partial 4th ray amputation, I+D abscess 7/20/22, f/u cx and path  MR as above with abnormal marrow signal 4th ray concerning for OM given + wound at this site. Also abnormal marrow signal within navicula and cuneiform, d/w podiatry this is likely also OM  c/w empiric meropenem and vancomycin  Monitor trough and adjust per pharmacy protocol.   f/u wound cx staph aureus and group b strep  f/u bcx  suggest PICC and 6 weeks of IV abx based on culture test results          d/w pt, CM    will f/u

## 2022-07-21 NOTE — PROGRESS NOTE ADULT - SUBJECTIVE AND OBJECTIVE BOX
Patient is a 56y old  Male who presents with a chief complaint of diabetic foot infection (20 Jul 2022 08:36)      pt denies any  pain rt foot. denies cp,sob,fever,chill,n/v  REVIEW OF SYSTEMS: All systems are reviewed and found to be negative except above    MEDICATIONS  (STANDING):  dextrose 5%. 1000 milliLiter(s) (100 mL/Hr) IV Continuous <Continuous>  dextrose 5%. 1000 milliLiter(s) (50 mL/Hr) IV Continuous <Continuous>  dextrose 5%. 1000 milliLiter(s) (100 mL/Hr) IV Continuous <Continuous>  dextrose 50% Injectable 25 Gram(s) IV Push once  dextrose 50% Injectable 12.5 Gram(s) IV Push once  dextrose 50% Injectable 25 Gram(s) IV Push once  dextrose 50% Injectable 12.5 Gram(s) IV Push once  enoxaparin Injectable 40 milliGRAM(s) SubCutaneous every 24 hours  glucagon  Injectable 1 milliGRAM(s) IntraMuscular once  insulin lispro (ADMELOG) corrective regimen sliding scale   SubCutaneous three times a day before meals  insulin lispro (ADMELOG) corrective regimen sliding scale   SubCutaneous three times a day before meals  insulin lispro Injectable (ADMELOG) 5 Unit(s) SubCutaneous three times a day before meals  lactated ringers. 1000 milliLiter(s) (75 mL/Hr) IV Continuous <Continuous>  meropenem  IVPB 1000 milliGRAM(s) IV Intermittent every 8 hours  sodium chloride 0.9%. 1000 milliLiter(s) (50 mL/Hr) IV Continuous <Continuous>  vancomycin  IVPB 1250 milliGRAM(s) IV Intermittent every 12 hours    MEDICATIONS  (PRN):  acetaminophen     Tablet .. 650 milliGRAM(s) Oral every 6 hours PRN Temp greater or equal to 38.5C (101.3F)  dextrose Oral Gel 15 Gram(s) Oral once PRN Blood Glucose LESS THAN 70 milliGRAM(s)/deciliter  dextrose Oral Gel 15 Gram(s) Oral once PRN Blood Glucose LESS THAN 70 milliGRAM(s)/deciliter  fentaNYL    Injectable 50 MICROGram(s) IV Push every 5 minutes PRN Severe Pain (7 - 10)  ondansetron Injectable 4 milliGRAM(s) IV Push once PRN Nausea and/or Vomiting  oxycodone    5 mG/acetaminophen 325 mG 1 Tablet(s) Oral every 6 hours PRN Severe Pain (7 - 10)  oxycodone    5 mG/acetaminophen 325 mG 1 Tablet(s) Oral every 6 hours PRN Severe Pain (7 - 10)      CAPILLARY BLOOD GLUCOSE      POCT Blood Glucose.: 286 mg/dL (20 Jul 2022 11:31)  POCT Blood Glucose.: 152 mg/dL (20 Jul 2022 06:34)  POCT Blood Glucose.: 146 mg/dL (19 Jul 2022 23:49)  POCT Blood Glucose.: 208 mg/dL (19 Jul 2022 16:21)  POCT Blood Glucose.: 171 mg/dL (19 Jul 2022 13:24)    I&O's Summary      PHYSICAL EXAM:  Vital Signs Last 24 Hrs  T(C): 36.5 (20 Jul 2022 10:20), Max: 36.8 (20 Jul 2022 04:13)  T(F): 97.7 (20 Jul 2022 10:20), Max: 98.3 (20 Jul 2022 04:13)  HR: 63 (20 Jul 2022 10:20) (58 - 73)  BP: 145/79 (20 Jul 2022 10:20) (100/67 - 146/90)  BP(mean): --  RR: 18 (20 Jul 2022 10:20) (10 - 18)  SpO2: 97% (20 Jul 2022 10:20) (94% - 99%)    Parameters below as of 20 Jul 2022 10:20  Patient On (Oxygen Delivery Method): room air        CONSTITUTIONAL: NAD,  EYES: PERRLA; conjunctiva and sclera clear  ENMT: Moist oral mucosa,   RESPIRATORY: Normal respiratory effort; lungs are clear to auscultation bilaterally  CARDIOVASCULAR: Regular rate and rhythm, normal S1 and S2, no murmur   EXTS: No lower extremity edema; Peripheral pulses are 2+ bilaterally  ABDOMEN: Nontender to palpation, normoactive bowel sounds, no rebound/guarding;   MUSCLOSKELETAL:  rt foot: bandage present, mild tender, PPP  PSYCH: affect appropriate  NEUROLOGY: A+O to person, place, and time; CN 2-12 are intact and symmetric; no gross sensory deficits;       LABS:                        13.1   7.00  )-----------( 227      ( 20 Jul 2022 01:45 )             36.7     07-20    137  |  104  |  15.5  ----------------------------<  149<H>  4.0   |  23.0  |  1.00    Ca    8.7      20 Jul 2022 01:45                Culture - Urine (collected 17 Jul 2022 21:59)  Source: Clean Catch Clean Catch (Midstream)  Final Report (19 Jul 2022 10:44):    <10,000 CFU/mL Normal Urogenital Camila    Culture - Blood (collected 17 Jul 2022 21:54)  Source: .Blood Blood-Peripheral  Preliminary Report (18 Jul 2022 22:02):    No growth to date.    Culture - Blood (collected 17 Jul 2022 21:54)  Source: .Blood Blood-Peripheral  Preliminary Report (18 Jul 2022 22:02):    No growth to date.        RADIOLOGY & ADDITIONAL TESTS:  Results Reviewed:   Imaging Personally Reviewed:  Electrocardiogram Personally Reviewed:    COORDINATION OF CARE:  Care Discussed with Consultants/Other Providers [Y/N]:  Prior or Outpatient Records Reviewed [Y/N]:

## 2022-07-22 ENCOUNTER — TRANSCRIPTION ENCOUNTER (OUTPATIENT)
Age: 57
End: 2022-07-22

## 2022-07-22 VITALS
SYSTOLIC BLOOD PRESSURE: 135 MMHG | OXYGEN SATURATION: 99 % | RESPIRATION RATE: 16 BRPM | HEART RATE: 65 BPM | DIASTOLIC BLOOD PRESSURE: 88 MMHG | TEMPERATURE: 98 F

## 2022-07-22 LAB
-  AMPICILLIN/SULBACTAM: SIGNIFICANT CHANGE UP
-  CEFAZOLIN: SIGNIFICANT CHANGE UP
-  CLINDAMYCIN: SIGNIFICANT CHANGE UP
-  ERYTHROMYCIN: SIGNIFICANT CHANGE UP
-  GENTAMICIN: SIGNIFICANT CHANGE UP
-  OXACILLIN: SIGNIFICANT CHANGE UP
-  PENICILLIN: SIGNIFICANT CHANGE UP
-  RIFAMPIN: SIGNIFICANT CHANGE UP
-  TETRACYCLINE: SIGNIFICANT CHANGE UP
-  TRIMETHOPRIM/SULFAMETHOXAZOLE: SIGNIFICANT CHANGE UP
-  VANCOMYCIN: SIGNIFICANT CHANGE UP
CULTURE RESULTS: SIGNIFICANT CHANGE UP
GLUCOSE BLDC GLUCOMTR-MCNC: 162 MG/DL — HIGH (ref 70–99)
GLUCOSE BLDC GLUCOMTR-MCNC: 196 MG/DL — HIGH (ref 70–99)
HIV 1 & 2 AB SERPL IA.RAPID: SIGNIFICANT CHANGE UP
METHOD TYPE: SIGNIFICANT CHANGE UP
ORGANISM # SPEC MICROSCOPIC CNT: SIGNIFICANT CHANGE UP
ORGANISM # SPEC MICROSCOPIC CNT: SIGNIFICANT CHANGE UP
SPECIMEN SOURCE: SIGNIFICANT CHANGE UP
VANCOMYCIN TROUGH SERPL-MCNC: 12.5 UG/ML — SIGNIFICANT CHANGE UP (ref 10–20)

## 2022-07-22 PROCEDURE — 85027 COMPLETE CBC AUTOMATED: CPT

## 2022-07-22 PROCEDURE — 93005 ELECTROCARDIOGRAM TRACING: CPT

## 2022-07-22 PROCEDURE — 87077 CULTURE AEROBIC IDENTIFY: CPT

## 2022-07-22 PROCEDURE — 85025 COMPLETE CBC W/AUTO DIFF WBC: CPT

## 2022-07-22 PROCEDURE — U0003: CPT

## 2022-07-22 PROCEDURE — 86703 HIV-1/HIV-2 1 RESULT ANTBDY: CPT

## 2022-07-22 PROCEDURE — 85652 RBC SED RATE AUTOMATED: CPT

## 2022-07-22 PROCEDURE — 81001 URINALYSIS AUTO W/SCOPE: CPT

## 2022-07-22 PROCEDURE — 83036 HEMOGLOBIN GLYCOSYLATED A1C: CPT

## 2022-07-22 PROCEDURE — 99285 EMERGENCY DEPT VISIT HI MDM: CPT

## 2022-07-22 PROCEDURE — 86850 RBC ANTIBODY SCREEN: CPT

## 2022-07-22 PROCEDURE — 73630 X-RAY EXAM OF FOOT: CPT

## 2022-07-22 PROCEDURE — U0005: CPT

## 2022-07-22 PROCEDURE — 85730 THROMBOPLASTIN TIME PARTIAL: CPT

## 2022-07-22 PROCEDURE — 96374 THER/PROPH/DIAG INJ IV PUSH: CPT

## 2022-07-22 PROCEDURE — 87070 CULTURE OTHR SPECIMN AEROBIC: CPT

## 2022-07-22 PROCEDURE — 83735 ASSAY OF MAGNESIUM: CPT

## 2022-07-22 PROCEDURE — 87040 BLOOD CULTURE FOR BACTERIA: CPT

## 2022-07-22 PROCEDURE — 99239 HOSP IP/OBS DSCHRG MGMT >30: CPT

## 2022-07-22 PROCEDURE — 88305 TISSUE EXAM BY PATHOLOGIST: CPT

## 2022-07-22 PROCEDURE — 83605 ASSAY OF LACTIC ACID: CPT

## 2022-07-22 PROCEDURE — 88311 DECALCIFY TISSUE: CPT

## 2022-07-22 PROCEDURE — 87205 SMEAR GRAM STAIN: CPT

## 2022-07-22 PROCEDURE — 80048 BASIC METABOLIC PNL TOTAL CA: CPT

## 2022-07-22 PROCEDURE — 87186 SC STD MICRODIL/AGAR DIL: CPT

## 2022-07-22 PROCEDURE — 71045 X-RAY EXAM CHEST 1 VIEW: CPT

## 2022-07-22 PROCEDURE — 87075 CULTR BACTERIA EXCEPT BLOOD: CPT

## 2022-07-22 PROCEDURE — 97163 PT EVAL HIGH COMPLEX 45 MIN: CPT

## 2022-07-22 PROCEDURE — 86901 BLOOD TYPING SEROLOGIC RH(D): CPT

## 2022-07-22 PROCEDURE — 96375 TX/PRO/DX INJ NEW DRUG ADDON: CPT

## 2022-07-22 PROCEDURE — 99232 SBSQ HOSP IP/OBS MODERATE 35: CPT

## 2022-07-22 PROCEDURE — 80202 ASSAY OF VANCOMYCIN: CPT

## 2022-07-22 PROCEDURE — 36415 COLL VENOUS BLD VENIPUNCTURE: CPT

## 2022-07-22 PROCEDURE — 86900 BLOOD TYPING SEROLOGIC ABO: CPT

## 2022-07-22 PROCEDURE — 84145 PROCALCITONIN (PCT): CPT

## 2022-07-22 PROCEDURE — 82962 GLUCOSE BLOOD TEST: CPT

## 2022-07-22 PROCEDURE — 85610 PROTHROMBIN TIME: CPT

## 2022-07-22 PROCEDURE — 0225U NFCT DS DNA&RNA 21 SARSCOV2: CPT

## 2022-07-22 PROCEDURE — 86140 C-REACTIVE PROTEIN: CPT

## 2022-07-22 PROCEDURE — 80053 COMPREHEN METABOLIC PANEL: CPT

## 2022-07-22 PROCEDURE — 87086 URINE CULTURE/COLONY COUNT: CPT

## 2022-07-22 PROCEDURE — 93970 EXTREMITY STUDY: CPT

## 2022-07-22 PROCEDURE — 88307 TISSUE EXAM BY PATHOLOGIST: CPT

## 2022-07-22 PROCEDURE — 73718 MRI LOWER EXTREMITY W/O DYE: CPT

## 2022-07-22 RX ORDER — ERTAPENEM SODIUM 1 G/1
1 INJECTION, POWDER, LYOPHILIZED, FOR SOLUTION INTRAMUSCULAR; INTRAVENOUS
Qty: 40 | Refills: 0
Start: 2022-07-22 | End: 2022-08-30

## 2022-07-22 RX ORDER — OXYCODONE AND ACETAMINOPHEN 5; 325 MG/1; MG/1
1 TABLET ORAL
Qty: 28 | Refills: 0
Start: 2022-07-22 | End: 2022-07-28

## 2022-07-22 RX ORDER — ERTAPENEM SODIUM 1 G/1
1000 INJECTION, POWDER, LYOPHILIZED, FOR SOLUTION INTRAMUSCULAR; INTRAVENOUS EVERY 24 HOURS
Refills: 0 | Status: DISCONTINUED | OUTPATIENT
Start: 2022-07-22 | End: 2022-07-22

## 2022-07-22 RX ADMIN — ERTAPENEM SODIUM 120 MILLIGRAM(S): 1 INJECTION, POWDER, LYOPHILIZED, FOR SOLUTION INTRAMUSCULAR; INTRAVENOUS at 12:27

## 2022-07-22 RX ADMIN — ENOXAPARIN SODIUM 40 MILLIGRAM(S): 100 INJECTION SUBCUTANEOUS at 06:58

## 2022-07-22 RX ADMIN — CHLORHEXIDINE GLUCONATE 1 APPLICATION(S): 213 SOLUTION TOPICAL at 06:53

## 2022-07-22 RX ADMIN — Medication 5 UNIT(S): at 08:58

## 2022-07-22 RX ADMIN — MEROPENEM 100 MILLIGRAM(S): 1 INJECTION INTRAVENOUS at 06:51

## 2022-07-22 RX ADMIN — Medication 5 UNIT(S): at 11:39

## 2022-07-22 RX ADMIN — Medication 2: at 08:57

## 2022-07-22 RX ADMIN — Medication 2: at 11:39

## 2022-07-22 NOTE — PHYSICAL THERAPY INITIAL EVALUATION ADULT - PATIENT/FAMILY AGREES WITH PLAN
Anesthesia Evaluation     no history of anesthetic complications:               Airway   Mallampati: III  TM distance: >3 FB  Neck ROM: full  Dental - normal exam     Pulmonary    (+) a smoker Former,   Cardiovascular - negative cardio ROS        Neuro/Psych- negative ROS  GI/Hepatic/Renal/Endo - negative ROS     Musculoskeletal (-) negative ROS    Abdominal    Substance History      OB/GYN    (+) Pregnant,         Other                        Anesthesia Plan    ASA 2     spinal   (Lab Results       Component                Value               Date                       WBC                      7.89                10/14/2020                 HGB                      13.3                10/14/2020                 HCT                      38.0                10/14/2020                 MCV                      91.1                10/14/2020                 PLT                      155                 10/14/2020            )    Anesthetic plan, all risks, benefits, and alternatives have been provided, discussed and informed consent has been obtained with: patient.       yes

## 2022-07-22 NOTE — PROGRESS NOTE ADULT - PROVIDER SPECIALTY LIST ADULT
Infectious Disease
Podiatry
Infectious Disease
Infectious Disease
Podiatry
Hospitalist
Infectious Disease
Podiatry
Hospitalist

## 2022-07-22 NOTE — DISCHARGE NOTE PROVIDER - NSDCFUSCHEDAPPT_GEN_ALL_CORE_FT
[facility]  Endocrin [address]  Scheduled Appointment: 08/15/2022

## 2022-07-22 NOTE — DISCHARGE NOTE NURSING/CASE MANAGEMENT/SOCIAL WORK - PATIENT PORTAL LINK FT
You can access the FollowMyHealth Patient Portal offered by [facility] by registering at the following website: [email or web address]. By joining [email or web address]’s FollowMyHealth portal, you will also be able to view your health information using other applications (apps) compatible with our system.

## 2022-07-22 NOTE — PROGRESS NOTE ADULT - SUBJECTIVE AND OBJECTIVE BOX
· Chief Complaint: The patient is a 56y Male complaining of wound check.  · HPI Objective Statement: 55 y/o male hx dm, chronic right foot ulcer sent by Dr. Alcazar office for iv abx and admission due to worsening foot ulcer. denies pain, no f/c, no other complaints. anaphylaxis to penicillin per pt.     	ROS: No fever/chills. No eye pain/changes in vision, No ear pain/sore throat/dysphagia, No chest pain/palpitations. No SOB/cough/. No abdominal pain, N/V/D, no black/bloody bm. No dysuria/frequency/discharge, No headache. No Dizziness. No numbness/tingling/weakness.    Podiatry HPI: Patient is a 56M PMH DM, h/o gout, sent in by Dr. Jain office for worsening R foot wound. Patient states that wound was getting worse and was seen in office over the weekend and was sent in for IV abx. Patient states he has no pain and no sensation to foot. He states that he has no other pedal complaints. Patient understands he may need amputation of the right 4th toe. Patient denies any fever, shares that on admission it was 99.5. Denies nausea, vomiting sob, chills, chest pain     Patient admits to  (-) Fevers, (-) Chills, (-) Nausea, (-) Vomiting, (-) Shortness of Breath (-) calf pain (-) chest pain     Podiatry interval HPI: Patient seen in bed, resting comfortably, Denies any acute overnight events. Pt s/p R 4th partial ray amputation 7/20 with Dr. Zendejas. Denies any pain to R foot. Denies constitutional symptoms. No other pedal complaints. Pt received PICC today 7/21. Pending dc to home      Medications acetaminophen     Tablet .. 650 milliGRAM(s) Oral every 6 hours PRN  chlorhexidine 2% Cloths 1 Application(s) Topical <User Schedule>  dextrose 5%. 1000 milliLiter(s) IV Continuous <Continuous>  dextrose 5%. 1000 milliLiter(s) IV Continuous <Continuous>  dextrose 5%. 1000 milliLiter(s) IV Continuous <Continuous>  dextrose 50% Injectable 25 Gram(s) IV Push once  dextrose 50% Injectable 12.5 Gram(s) IV Push once  dextrose 50% Injectable 25 Gram(s) IV Push once  dextrose 50% Injectable 12.5 Gram(s) IV Push once  dextrose Oral Gel 15 Gram(s) Oral once PRN  dextrose Oral Gel 15 Gram(s) Oral once PRN  enoxaparin Injectable 40 milliGRAM(s) SubCutaneous every 24 hours  ertapenem  IVPB 1000 milliGRAM(s) IV Intermittent every 24 hours  fentaNYL    Injectable 50 MICROGram(s) IV Push every 5 minutes PRN  glucagon  Injectable 1 milliGRAM(s) IntraMuscular once  insulin lispro (ADMELOG) corrective regimen sliding scale   SubCutaneous three times a day before meals  insulin lispro Injectable (ADMELOG) 5 Unit(s) SubCutaneous three times a day before meals  lactated ringers. 1000 milliLiter(s) IV Continuous <Continuous>  ondansetron Injectable 4 milliGRAM(s) IV Push once PRN  oxycodone    5 mG/acetaminophen 325 mG 1 Tablet(s) Oral every 6 hours PRN  oxycodone    5 mG/acetaminophen 325 mG 1 Tablet(s) Oral every 6 hours PRN  sodium chloride 0.9% lock flush 10 milliLiter(s) IV Push every 1 hour PRN  sodium chloride 0.9%. 1000 milliLiter(s) IV Continuous <Continuous>    FHFHx: diabetes mellitus (Grandparent)    ,   PMHDiabetes mellitus    H/O diabetic neuropathy       PSHHistory of partial amputation of toe        Labs                          13.0   9.55  )-----------( 261      ( 21 Jul 2022 08:15 )             37.0      07-21    136  |  101  |  18.2  ----------------------------<  272<H>  3.9   |  23.0  |  0.94    Ca    8.6      21 Jul 2022 08:15       Vital Signs Last 24 Hrs  T(C): 36.8 (22 Jul 2022 13:41), Max: 36.8 (22 Jul 2022 13:41)  T(F): 98.2 (22 Jul 2022 13:41), Max: 98.2 (22 Jul 2022 13:41)  HR: 65 (22 Jul 2022 13:41) (62 - 67)  BP: 135/88 (22 Jul 2022 13:41) (111/76 - 135/88)  BP(mean): --  RR: 16 (22 Jul 2022 13:41) (16 - 20)  SpO2: 99% (22 Jul 2022 13:41) (96% - 99%)    Parameters below as of 22 Jul 2022 13:41  Patient On (Oxygen Delivery Method): room air      Sedimentation Rate, Erythrocyte: 48 mm/hr (07-17-22 @ 21:57)         C-Reactive Protein, Serum: 204 mg/L (07-17-22 @ 13:49)       Derm: R foot wound noted to plantar submet4 measures 1cm x 1cm x 1.0. Sutures intact and noted to R dorsal foot incorporated into skin graft, no signs of wound dehiscence, no erythema or edema, no active drainage noted. No clinical signs of infection noted R foot   Vasc: DP/PT pulses palpable 2/4 b/l. No edema or erythema noted to R foot. Skin temperature noted to be warm to cool  from proximal to distal b/l. Pedal hair growth present  Neuro: Protective and epicritic sensation grossly absent  Musc:  s/p partial 4th & 5th, partial 1st ray amp on right, sp partial 1st amp on left      xrays 7/17:  < from: MR Foot No Cont, Right (07.19.22 @ 01:44) >  ACC: [id number] EXAM:  MR FOOT RT                          PROCEDURE DATE:  07/19/2022          INTERPRETATION:  MR FOOT RIGHT dated 7/19/2022 1:44 AM    INDICATION: Pain and swelling    COMPARISON: Foot radiographs dated 7/17/2022    TECHNIQUE: Multi-sequential, multiplanar MRI imaging of the right midfoot   and forefoot was performed per standard protocol.    FINDINGS:    BONE MARROW: The patient is status post amputation of the first ray to   level of the first metatarsal head and the fifth ray to level of the   fifth metatarsal head. There are erosive changes involving the base of   the second and fourth proximal phalanges and the second and fourth   metatarsal heads. There are areas of decreased bone T1 marrow signal with   associated marrow edema within the areas of erosions. There is a   pathologic fracture at the base of the second proximal phalanx. There is   joint space narrowing and productive changes at the first through third   tarsometatarsal joints. Decreased T1 signal and increased T2 signal noted   within the base of the second metatarsal and the middle cuneiform and   navicula.  SYNOVIUM/ JOINT FLUID: No large joint effusion  TENDONS: Postsurgical changes of the first and fifth flexor and extensor   tendons. High-grade partial tearing of the flexor tendons to the second   digit.  MUSCLES: Atrophy and edema in the intrinsic musculature the foot.  NEUROVASCULAR STRUCTURES: Preserved  SUBCUTANEOUS SOFT TISSUES: There are skin wounds over the medial and   lateral forefoot. Packing material is seen within the lateral forefoot   skin wound. There is edema about the forefoot. No drainable fluid   collection is seen.    IMPRESSION:    1.  Skin wound at the medial lateral forefoot with soft tissue swelling   concerning for cellulitis. No drainable fluid collection.  2.  Abnormal marrow signal within the second and fourth rays as detailed   above. Given the adjacent skin wound, findings are concerning for   osteomyelitis.  3.  Abnormal marrow signal within the navicula and medial cuneiform. No   definite adjacent skin wound. The findings likely reflect stress   reaction. Correlate clinically for skin wound which would increase the   probability of osteomyelitis in these regions.  4.  Pathologic fracture at the base of the second proximal phalanx.    --- End of Report ---          < end of copied text >  < from: Xray Foot AP + Lateral + Oblique, Right (07.17.22 @ 14:58) >  ACC: [id number] EXAM:  XR FOOT COMP MIN 3 VIEWS RT                          PROCEDURE DATE:  07/17/2022          INTERPRETATION:  Clinical history: 86-year-old male, diabetic foot   infection.    Three views of the right foot are correlated with the MRI of 7/19/2022   which better characterizes findings concerning for osteomyelitis and   pathological fracture of the proximal phalanx of the second digit.    IMPRESSION:  Findings concerning for osteomyelitis and pathologic fracture, better   characterized on MRI      < end of copied text >          A:  s/p R 4th partial ray amputation   Right foot 4th metatarsal Osteomyelitis   Right foot wound   DM Type II       P:  Patient evaluated and chart reviewed   WBC within normal limits, patient afebrile  Reviewed xrays & MRI R foot - results noted above  Bone cx reviewed - strep B   Excisional debridement of Right foot post operative site down to and including muscle using sterile #[id number]  Pt tolerated procedure well   Applied xeroform, DSD ACE to post op R foot  Pt to keep dressing clean, dry and intact R foot  PT able to wb as tolerated Right foot in surgical shoe  No WCO needed upon discharge  Pt stable form podiatry standpoint  Appreciate ID reccs   Will cont to follow   F/u with Dr. Zendejas one week upon discharge  Discussed and seen bedside with Dr. Zendejas

## 2022-07-22 NOTE — PROGRESS NOTE ADULT - ASSESSMENT
56 year old male with medical history significant for IDDM and prior diabetic foot ulcers needing toe amputations in the past presented with fever, redness and swelling of RLE as well as chronic rt plantar ulcer x 2 months.    Cellulitis of right foot with plantar wound infection and osteomyelitis- skin graft also appeared involveed  s/p rt 4t toe partial 4th ray amputation, I+D abscess 7/20/22, f/u cx and path  MR as above with abnormal marrow signal 4th ray concerning for OM given + wound at this site. Also abnormal marrow signal within navicula and cuneiform, d/w podiatry this is likely also OM  wound cx ms staph aureus and group b strep  BCX ngtd  surgical cultures are not final but growing Grp B Strep  f/u final OR cx and path  dc nicole/vanco  picc placed  suggest PICC and 6 weeks of IV abx -ertapanem 1 g IV daily weekly cbc cmp esr crp end 8/31/22. script provided to CM. If  home abx can be arranged for tomorrow, pt can potentially leave today      d/w pt, Dr Arroyo, MARTIN Sally    ID f/u 3-4 weeks  signing off        d/w pt, CM    will f/u

## 2022-07-22 NOTE — DISCHARGE NOTE PROVIDER - ATTENDING DISCHARGE PHYSICAL EXAMINATION:
PHYSICAL EXAM:  General: in no acute distress  Eyes: PERRLA, EOMI; conjunctiva and sclera clear  Head: Normocephalic; atraumatic  ENMT: No nasal discharge; airway clear  Neck: Supple; non tender; no masses  Respiratory: No wheezes, rales or rhonchi  Cardiovascular: Regular rate and rhythm. S1 and S2 Normal; No murmurs, gallops or rubs  Gastrointestinal: Soft non-tender non-distended; Normal bowel sounds  Genitourinary: No costovertebral angle tenderness  Extremities: Normal range of motion, No clubbing, cyanosis or edema; left foot under dressing  Vascular: Peripheral pulses palpable 2+ bilaterally  Neurological: Alert and oriented x4  Skin: Warm and dry. No acute rash  Psychiatric: Cooperative and appropriate

## 2022-07-22 NOTE — PHYSICAL THERAPY INITIAL EVALUATION ADULT - ADDITIONAL COMMENTS
as per pt: upon D/C is moving with girlfriend to the private apartment with no steps to negotiates, owns [employer], denies hx of falling, girlfriend available to assist as needed upon D/C

## 2022-07-22 NOTE — PROGRESS NOTE ADULT - REASON FOR ADMISSION
diabetic foot infection
18

## 2022-07-22 NOTE — PHYSICAL THERAPY INITIAL EVALUATION ADULT - ACTIVE RANGE OF MOTION EXAMINATION, REHAB EVAL
assessed during functional mobility, resistance not applied/bilateral lower extremity Active ROM was WNL (within normal limits)/bilateral upper extremity Active ROM was WFL (within functional limits)

## 2022-07-22 NOTE — DISCHARGE NOTE PROVIDER - HOSPITAL COURSE
56 year old male with medical history significant for IDDM and prior diabetic foot ulcers/OM  s/p partial 5th, partial 1st ray amp on right, sp partial 1st , Skin grafting rt foot came to ed with fever 102 on Friday , suddenly the skin flap sort of opened and got red and swollen and some discharge. he is followed by Dr Jain by  podiatry and recommends to come to ed. Started IV merem and zosyn, seen by podiatry, ID. High esr/crp.     Patient found to have osteo on MRI of the foot. He underwent debridement with podiatry. tissue cultured group B strept and MSSA. Patient has penicillin allergy. Will have him continue ertapenem on discharge.

## 2022-07-22 NOTE — PROGRESS NOTE ADULT - SUBJECTIVE AND OBJECTIVE BOX
[facility]                                                INFECTIOUS DISEASES  =======================================================                               James Esteves MD#  Sidney Law MD*                                     Selwyn Callejas MD*    Kavita Reynolds MD*            Diplomates American Board of Internal Medicine & Infectious Diseases                  # [place] Office - Appt - Tel  [phone] Fax [phone]                * [place] Office - Appt - Tel [phone] Fax [phone]                                  Hospital Consult line:  [phone]  =======================================================      [id number]  ZEINAB SCHNEIDER   follow up for: OM  afebrile  awaiting discharge plan  no complaints  patient seen and examined.       I have personally reviewed the labs and data; pertinent labs and data are listed in this note; please see below.   ===================================================  REVIEW OF SYSTEMS:  CONSTITUTIONAL:  No Fever or chills  HEENT:  No diplopia or blurred vision.  No earache, sore throat or runny nose.  CARDIOVASCULAR:  No pressure, squeezing, strangling, tightness, heaviness or aching about the chest, neck, axilla or epigastrium.  RESPIRATORY:  No cough, shortness of breath  GASTROINTESTINAL:  No nausea, vomiting or diarrhea.  GENITOURINARY:  No dysuria, frequency or urgency. No Blood in urine  MUSCULOSKELETAL:  no joint aches, no muscle pain  SKIN:  No change in skin, hair or nails.  NEUROLOGIC:  No Headaches, seizures or weakness.  PSYCHIATRIC:  No disorder of thought or mood.  ENDOCRINE:  No heat or cold intolerance  HEMATOLOGICAL:  No easy bruising or bleeding.    =======================================================  Allergies    penicillin (Anaphylaxis)  sulfa drugs (Other)    Intolerances    Keflex (Other)  Antibiotics:  ertapenem  IVPB 1000 milliGRAM(s) IV Intermittent every 24 hours    Other medications:  chlorhexidine 2% Cloths 1 Application(s) Topical <User Schedule>  dextrose 5%. 1000 milliLiter(s) IV Continuous <Continuous>  dextrose 5%. 1000 milliLiter(s) IV Continuous <Continuous>  dextrose 5%. 1000 milliLiter(s) IV Continuous <Continuous>  dextrose 50% Injectable 25 Gram(s) IV Push once  dextrose 50% Injectable 12.5 Gram(s) IV Push once  dextrose 50% Injectable 25 Gram(s) IV Push once  dextrose 50% Injectable 12.5 Gram(s) IV Push once  enoxaparin Injectable 40 milliGRAM(s) SubCutaneous every 24 hours  glucagon  Injectable 1 milliGRAM(s) IntraMuscular once  insulin lispro (ADMELOG) corrective regimen sliding scale   SubCutaneous three times a day before meals  insulin lispro Injectable (ADMELOG) 5 Unit(s) SubCutaneous three times a day before meals  lactated ringers. 1000 milliLiter(s) IV Continuous <Continuous>  sodium chloride 0.9%. 1000 milliLiter(s) IV Continuous <Continuous>    ======================================================  Physical Exam:  ============  T(F): 97.9 (22 Jul 2022 10:23), Max: 97.9 (21 Jul 2022 16:47)  HR: 67 (22 Jul 2022 10:23)  BP: 111/76 (22 Jul 2022 10:23)  RR: 16 (22 Jul 2022 10:23)  SpO2: 96% (22 Jul 2022 10:23) (96% - 97%)  temp max in last 48H T(F): , Max: 98.2 (07-20-22 @ 16:30)    General:  No acute distress.  Eye: Pupils are equal, round and reactive to light, Extraocular movements are intact, Normal conjunctiva.  HENT: Normocephalic, Oral mucosa is moist, No pharyngeal erythema, No sinus tenderness.  Neck: Supple, No lymphadenopathy.  Respiratory: Lungs are clear to auscultation, Respirations are non-labored.  Cardiovascular: Normal rate, Regular rhythm,    Gastrointestinal: Soft, Non-tender, Non-distended, Normal bowel sounds.  Genitourinary: No costovertebral angle tenderness.  Lymphatics: No lymphadenopathy neck,   Musculoskeletal: Normal range of motion, Normal strength. rt foot dressing intact-images see-surgical wound with sutures intact through graft, plantar circular wound no drainage  Integumentary: No rash.  Neurologic: Alert, Oriented, No focal deficits, Cranial Nerves II-XII are grossly intact.  Psychiatric: Appropriate mood & affect.  =======================================================  Labs:                        13.0   9.55  )-----------( 261      ( 21 Jul 2022 08:15 )             37.0     07-21    136  |  101  |  18.2  ----------------------------<  272<H>  3.9   |  23.0  |  0.94    Ca    8.6      21 Jul 2022 08:15        Culture - Tissue with Gram Stain (collected 07-20-22 @ 08:01)  Source: .Tissue Right Foot Osteomyelitis  Gram Stain (07-20-22 @ 22:52):    No polymorphonuclear cells seen per low power field    No organisms seen per oil power field    Culture - Surgical Swab (collected 07-20-22 @ 08:00)  Source: .Surgical Swab Deep Wound Bone    Culture - Abscess with Gram Stain (collected 07-19-22 @ 15:31)  Source: .Abscess Leg - Right  Organism: Staphylococcus aureus (07-21-22 @ 16:17)  Organism: Staphylococcus aureus (07-21-22 @ 16:17)    Sensitivities:      -  Ampicillin/Sulbactam: S <=8/4      -  Cefazolin: S <=4      -  Clindamycin: R >4      -  Erythromycin: S <=0.25      -  Gentamicin: R >8 Should not be used as monotherapy      -  Oxacillin: S 0.5 Oxacillin predicts susceptibility for dicloxacillin, methicillin, and nafcillin      -  Penicillin: R >8      -  Rifampin: S <=1 Should not be used as monotherapy      -  Tetra/Doxy: S <=1      -  Trimethoprim/Sulfamethoxazole: S <=0.5/9.5      -  Vancomycin: S 1      Method Type: ANTOINE    Culture - Urine (collected 07-17-22 @ 21:59)  Source: Clean Catch Clean Catch (Midstream)  Final Report (07-19-22 @ 10:44):    <10,000 CFU/mL Normal Urogenital Camila    Culture - Blood (collected 07-17-22 @ 21:54)  Source: .Blood Blood-Peripheral    Culture - Blood (collected 07-17-22 @ 21:54)  Source: .Blood Blood-Peripheral

## 2022-07-22 NOTE — DISCHARGE NOTE PROVIDER - NSDCCPCAREPLAN_GEN_ALL_CORE_FT
PRINCIPAL DISCHARGE DIAGNOSIS  Diagnosis: Diabetic foot infection  Assessment and Plan of Treatment: continue with ertapenem daily IV infusion  can use percocet for pain control  follow up with Dr. Jain  follow up with Dr. Reynolds      SECONDARY DISCHARGE DIAGNOSES  Diagnosis: Diabetes mellitus, type II, insulin dependent  Assessment and Plan of Treatment: continue lantus and admelog at home  follow up with your PCP

## 2022-07-22 NOTE — PHYSICAL THERAPY INITIAL EVALUATION ADULT - PERTINENT HX OF CURRENT PROBLEM, REHAB EVAL
(+) chronic right foot ulcer, present to [facility] for diabetic foot infection

## 2022-07-22 NOTE — DISCHARGE NOTE PROVIDER - NSDCMRMEDTOKEN_GEN_ALL_CORE_FT
ertapenem 1 g injection: 1 gram(s) intravenously IVPB once a day via PICC through 8/31/22 weekly CBC CMP ESR CRP fax [phone] MDD:1g  insulin glargine: 18 unit(s) subcutaneous once a day (at bedtime)   Insulin Lispro Quinn KwikPen 100 units/mL injectable solution: 6 unit(s) injectable 3 times a day (before meals)  oxycodone-acetaminophen 5 mg-325 mg oral tablet: 1 tab(s) orally every 6 hours, As needed, Severe Pain (7 - 10) MDD:4 tabs

## 2022-07-24 LAB
CULTURE RESULTS: SIGNIFICANT CHANGE UP
ORGANISM # SPEC MICROSCOPIC CNT: SIGNIFICANT CHANGE UP
ORGANISM # SPEC MICROSCOPIC CNT: SIGNIFICANT CHANGE UP
SPECIMEN SOURCE: SIGNIFICANT CHANGE UP

## 2022-07-25 LAB
CULTURE RESULTS: SIGNIFICANT CHANGE UP
SPECIMEN SOURCE: SIGNIFICANT CHANGE UP

## 2022-07-27 NOTE — CDI QUERY NOTE - NSCDIOTHERTXTBX_GEN_ALL_CORE_HH
ED-  55yo male with pmh of IDDM, diabetic foot ulcer and amputation presents with right foot infection. Pt states for the past week with redness, pain, discharge from right foot.  Principal Discharge DX:	Diabetic foot infection    H&P- foot wound ulcer not preceded by any injury or bug bite , his prior toe amputations were years ago- suddenly the skin flap sort of opened and got red and swollen and some discharge  Foot toe flap cellulitis infection- possible OM0 start Merrem and Vanco for broad spectrum coverage     ID consult- AS ABOVE PT   WITH ULCER ON BASE OF FOOT  WITH DRAINAGE  PT PLACED ON IV ABX  BLOOD CX X2 SETS DONE   ON MERREM VANCO  HAD PREVIOUS INFECTION  WITH AMPUTATION nov 2021  WAS ON INVANZ/VANCO IN PAST AT HOME    7/21 ID- Cellulitis of right foot with plantar wound infection and osteomyelitis- skin graft also appeared involveed  s/p rt 4t toe partial 4th ray amputation, I+D abscess 7/20/22, f/u cx and path  MR as above with abnormal marrow signal 4th ray concerning for OM given + wound at this site. Also abnormal marrow signal within navicula and cuneiform, d/w podiatry this is likely also OM    7/22-Podiatry- s/p R 4th partial ray amputation   Right foot 4th metatarsal Osteomyelitis   Right foot wound   DM Type II     Please clarify if the right foot osteomyelitis is a complication of prior toe amputations.    1) Right foot osteomyelitis due to post operative complication  2) Right foot osteomyelitis due to DM, unrelated to previous amputations.  3) Right foot osteomyelitis due to post operative complication and DM.  4) Other ( please specify)

## 2022-07-30 LAB — SURGICAL PATHOLOGY STUDY: SIGNIFICANT CHANGE UP

## 2022-08-11 LAB
ANION GAP SERPL CALC-SCNC: 11 MMOL/L
BUN SERPL-MCNC: 17 MG/DL
CALCIUM SERPL-MCNC: 9.8 MG/DL
CHLORIDE SERPL-SCNC: 105 MMOL/L
CHOLEST SERPL-MCNC: 213 MG/DL
CO2 SERPL-SCNC: 23 MMOL/L
CREAT SERPL-MCNC: 1.07 MG/DL
CREAT SPEC-SCNC: 109 MG/DL
EGFR: 82 ML/MIN/1.73M2
ESTIMATED AVERAGE GLUCOSE: 200 MG/DL
GLUCOSE SERPL-MCNC: 140 MG/DL
HBA1C MFR BLD HPLC: 8.6 %
HDLC SERPL-MCNC: 35 MG/DL
LDLC SERPL CALC-MCNC: 143 MG/DL
MICROALBUMIN 24H UR DL<=1MG/L-MCNC: <1.2 MG/DL
MICROALBUMIN/CREAT 24H UR-RTO: NORMAL MG/G
NONHDLC SERPL-MCNC: 178 MG/DL
POTASSIUM SERPL-SCNC: 4.5 MMOL/L
SODIUM SERPL-SCNC: 139 MMOL/L
TRIGL SERPL-MCNC: 174 MG/DL

## 2022-08-15 ENCOUNTER — APPOINTMENT (OUTPATIENT)
Dept: ENDOCRINOLOGY | Facility: CLINIC | Age: 57
End: 2022-08-15

## 2022-08-15 VITALS
DIASTOLIC BLOOD PRESSURE: 78 MMHG | HEART RATE: 67 BPM | BODY MASS INDEX: 32.74 KG/M2 | RESPIRATION RATE: 16 BRPM | HEIGHT: 68 IN | WEIGHT: 216 LBS | OXYGEN SATURATION: 97 % | SYSTOLIC BLOOD PRESSURE: 116 MMHG | TEMPERATURE: 97.6 F

## 2022-08-15 PROCEDURE — 99214 OFFICE O/P EST MOD 30 MIN: CPT

## 2023-02-08 NOTE — ASSESSMENT
[FreeTextEntry1] : 55 y/o male has Osteomyelitis of left foot with Type 2 DM. A1C 12.6% \par \par Plan: \par Type 2 DM: uncontrolled\par - Continue Lantus 16 units at HS\par -Humalog 6 units TID AC \par -+ rotating injection sites\par - send in logs weekly until next appointment \par - continue low carb diet \par -exercise as tolerated\par - educated on the importance of annual eye exams r/t retinopathy \par -Follow up with ID and podiatry \par -Needs frequent follow up for tight glucose control\par \par Osteomyelitis of left foot: continue to follow up with ID and start antibiotics as prescribed \par \par - RTO in 4 weeks NP\par -RTO in 4 months Dr. Maldonado \par  [Normal] : soft, non-tender, non-distended, no masses palpated, no HSM and normal bowel sounds [de-identified] : No lower leg swelling [de-identified] : dry skin

## 2023-02-13 NOTE — DISCHARGE NOTE PROVIDER - CARE PROVIDER_API CALL
Gabino Guzmán (DPM)  Podiatric Medicine; Podiatric Medicine and Surgery; Wound Care  [address] [facility] [facility] [address] [facility] [address]  [place] [address]  Phone: [phone]  Fax: [phone]  Follow Up Time:     Kavita Reynolds)  Internal Medicine  [address] [place] [address]  Phone: [phone]  Fax: [phone]  Follow Up Time:    Likely traumatic  - Patient kicked on Ye, and Ye was found to be malfunctioning in PM 2/12  - Gross hematuria with Ye removal  - Urology replaced the ye, and cleared for continuing Eliquis  - HD downtrending but no overt signs of bleeding  - If continue to have gross hematuria, will check STAT CBC.

## 2023-03-06 ENCOUNTER — APPOINTMENT (OUTPATIENT)
Dept: ENDOCRINOLOGY | Facility: CLINIC | Age: 58
End: 2023-03-06

## 2023-05-05 ENCOUNTER — RX RENEWAL (OUTPATIENT)
Age: 58
End: 2023-05-05

## 2024-03-06 ENCOUNTER — INPATIENT (INPATIENT)
Facility: HOSPITAL | Age: 59
LOS: 1 days | Discharge: HOME | DRG: 638 | End: 2024-03-08
Attending: STUDENT IN AN ORGANIZED HEALTH CARE EDUCATION/TRAINING PROGRAM | Admitting: EMERGENCY MEDICINE
Payer: COMMERCIAL

## 2024-03-06 VITALS
DIASTOLIC BLOOD PRESSURE: 90 MMHG | TEMPERATURE: 99 F | HEIGHT: 68 IN | RESPIRATION RATE: 20 BRPM | SYSTOLIC BLOOD PRESSURE: 138 MMHG | HEART RATE: 96 BPM | OXYGEN SATURATION: 98 % | WEIGHT: 210.1 LBS

## 2024-03-06 DIAGNOSIS — M86.9 OSTEOMYELITIS, UNSPECIFIED: ICD-10-CM

## 2024-03-06 DIAGNOSIS — Z89.429 ACQUIRED ABSENCE OF OTHER TOE(S), UNSPECIFIED SIDE: Chronic | ICD-10-CM

## 2024-03-06 DIAGNOSIS — E11.65 TYPE 2 DIABETES MELLITUS WITH HYPERGLYCEMIA: ICD-10-CM

## 2024-03-06 DIAGNOSIS — L03.90 CELLULITIS, UNSPECIFIED: ICD-10-CM

## 2024-03-06 DIAGNOSIS — E87.1 HYPO-OSMOLALITY AND HYPONATREMIA: ICD-10-CM

## 2024-03-06 DIAGNOSIS — L03.116 CELLULITIS OF LEFT LOWER LIMB: ICD-10-CM

## 2024-03-06 DIAGNOSIS — E11.621 TYPE 2 DIABETES MELLITUS WITH FOOT ULCER: ICD-10-CM

## 2024-03-06 LAB
A1C WITH ESTIMATED AVERAGE GLUCOSE RESULT: 14.1 % — HIGH (ref 4–5.6)
ALBUMIN SERPL ELPH-MCNC: 3.8 G/DL — SIGNIFICANT CHANGE UP (ref 3.3–5.2)
ALP SERPL-CCNC: 120 U/L — SIGNIFICANT CHANGE UP (ref 40–120)
ALT FLD-CCNC: 8 U/L — SIGNIFICANT CHANGE UP
ANION GAP SERPL CALC-SCNC: 15 MMOL/L — SIGNIFICANT CHANGE UP (ref 5–17)
APPEARANCE UR: CLEAR — SIGNIFICANT CHANGE UP
APTT BLD: 27.1 SEC — SIGNIFICANT CHANGE UP (ref 24.5–35.6)
AST SERPL-CCNC: 8 U/L — SIGNIFICANT CHANGE UP
BASOPHILS # BLD AUTO: 0.03 K/UL — SIGNIFICANT CHANGE UP (ref 0–0.2)
BASOPHILS NFR BLD AUTO: 0.2 % — SIGNIFICANT CHANGE UP (ref 0–2)
BILIRUB SERPL-MCNC: 0.9 MG/DL — SIGNIFICANT CHANGE UP (ref 0.4–2)
BILIRUB UR-MCNC: NEGATIVE — SIGNIFICANT CHANGE UP
BUN SERPL-MCNC: 13.2 MG/DL — SIGNIFICANT CHANGE UP (ref 8–20)
CALCIUM SERPL-MCNC: 8.9 MG/DL — SIGNIFICANT CHANGE UP (ref 8.4–10.5)
CHLORIDE SERPL-SCNC: 95 MMOL/L — LOW (ref 96–108)
CO2 SERPL-SCNC: 21 MMOL/L — LOW (ref 22–29)
COLOR SPEC: YELLOW — SIGNIFICANT CHANGE UP
CREAT SERPL-MCNC: 1.02 MG/DL — SIGNIFICANT CHANGE UP (ref 0.5–1.3)
CRP SERPL-MCNC: 68 MG/L — HIGH
DIFF PNL FLD: NEGATIVE — SIGNIFICANT CHANGE UP
EGFR: 86 ML/MIN/1.73M2 — SIGNIFICANT CHANGE UP
EOSINOPHIL # BLD AUTO: 0.09 K/UL — SIGNIFICANT CHANGE UP (ref 0–0.5)
EOSINOPHIL NFR BLD AUTO: 0.7 % — SIGNIFICANT CHANGE UP (ref 0–6)
ESTIMATED AVERAGE GLUCOSE: 358 MG/DL — HIGH (ref 68–114)
GLUCOSE BLDC GLUCOMTR-MCNC: 245 MG/DL — HIGH (ref 70–99)
GLUCOSE BLDC GLUCOMTR-MCNC: 310 MG/DL — HIGH (ref 70–99)
GLUCOSE BLDC GLUCOMTR-MCNC: 343 MG/DL — HIGH (ref 70–99)
GLUCOSE SERPL-MCNC: 366 MG/DL — HIGH (ref 70–99)
GLUCOSE UR QL: >=1000 MG/DL
HCT VFR BLD CALC: 42.5 % — SIGNIFICANT CHANGE UP (ref 39–50)
HGB BLD-MCNC: 15.4 G/DL — SIGNIFICANT CHANGE UP (ref 13–17)
IMM GRANULOCYTES NFR BLD AUTO: 0.6 % — SIGNIFICANT CHANGE UP (ref 0–0.9)
INR BLD: 0.98 RATIO — SIGNIFICANT CHANGE UP (ref 0.85–1.18)
KETONES UR-MCNC: 40 MG/DL
LACTATE BLDV-MCNC: 1.6 MMOL/L — SIGNIFICANT CHANGE UP (ref 0.5–2)
LEUKOCYTE ESTERASE UR-ACNC: NEGATIVE — SIGNIFICANT CHANGE UP
LYMPHOCYTES # BLD AUTO: 1.74 K/UL — SIGNIFICANT CHANGE UP (ref 1–3.3)
LYMPHOCYTES # BLD AUTO: 13.7 % — SIGNIFICANT CHANGE UP (ref 13–44)
MCHC RBC-ENTMCNC: 29.6 PG — SIGNIFICANT CHANGE UP (ref 27–34)
MCHC RBC-ENTMCNC: 36.2 GM/DL — HIGH (ref 32–36)
MCV RBC AUTO: 81.6 FL — SIGNIFICANT CHANGE UP (ref 80–100)
MONOCYTES # BLD AUTO: 0.88 K/UL — SIGNIFICANT CHANGE UP (ref 0–0.9)
MONOCYTES NFR BLD AUTO: 6.9 % — SIGNIFICANT CHANGE UP (ref 2–14)
NEUTROPHILS # BLD AUTO: 9.9 K/UL — HIGH (ref 1.8–7.4)
NEUTROPHILS NFR BLD AUTO: 77.9 % — HIGH (ref 43–77)
NITRITE UR-MCNC: NEGATIVE — SIGNIFICANT CHANGE UP
PH UR: 6 — SIGNIFICANT CHANGE UP (ref 5–8)
PLATELET # BLD AUTO: 251 K/UL — SIGNIFICANT CHANGE UP (ref 150–400)
POTASSIUM SERPL-MCNC: 4.1 MMOL/L — SIGNIFICANT CHANGE UP (ref 3.5–5.3)
POTASSIUM SERPL-SCNC: 4.1 MMOL/L — SIGNIFICANT CHANGE UP (ref 3.5–5.3)
PROT SERPL-MCNC: 7.1 G/DL — SIGNIFICANT CHANGE UP (ref 6.6–8.7)
PROT UR-MCNC: SIGNIFICANT CHANGE UP MG/DL
PROTHROM AB SERPL-ACNC: 10.9 SEC — SIGNIFICANT CHANGE UP (ref 9.5–13)
RBC # BLD: 5.21 M/UL — SIGNIFICANT CHANGE UP (ref 4.2–5.8)
RBC # FLD: 12.2 % — SIGNIFICANT CHANGE UP (ref 10.3–14.5)
SODIUM SERPL-SCNC: 131 MMOL/L — LOW (ref 135–145)
SP GR SPEC: >1.03 — HIGH (ref 1–1.03)
UROBILINOGEN FLD QL: 0.2 MG/DL — SIGNIFICANT CHANGE UP (ref 0.2–1)
WBC # BLD: 12.71 K/UL — HIGH (ref 3.8–10.5)
WBC # FLD AUTO: 12.71 K/UL — HIGH (ref 3.8–10.5)

## 2024-03-06 PROCEDURE — 73590 X-RAY EXAM OF LOWER LEG: CPT | Mod: 26,LT

## 2024-03-06 PROCEDURE — 73718 MRI LOWER EXTREMITY W/O DYE: CPT | Mod: 26,LT

## 2024-03-06 PROCEDURE — 73630 X-RAY EXAM OF FOOT: CPT | Mod: 26,LT

## 2024-03-06 PROCEDURE — 73610 X-RAY EXAM OF ANKLE: CPT | Mod: 26,LT

## 2024-03-06 PROCEDURE — 99223 1ST HOSP IP/OBS HIGH 75: CPT

## 2024-03-06 PROCEDURE — 99285 EMERGENCY DEPT VISIT HI MDM: CPT

## 2024-03-06 RX ORDER — SODIUM CHLORIDE 9 MG/ML
1000 INJECTION, SOLUTION INTRAVENOUS
Refills: 0 | Status: DISCONTINUED | OUTPATIENT
Start: 2024-03-06 | End: 2024-03-08

## 2024-03-06 RX ORDER — INSULIN LISPRO 100/ML
VIAL (ML) SUBCUTANEOUS
Refills: 0 | Status: DISCONTINUED | OUTPATIENT
Start: 2024-03-06 | End: 2024-03-08

## 2024-03-06 RX ORDER — VANCOMYCIN HCL 1 G
1450 VIAL (EA) INTRAVENOUS ONCE
Refills: 0 | Status: DISCONTINUED | OUTPATIENT
Start: 2024-03-06 | End: 2024-03-06

## 2024-03-06 RX ORDER — VANCOMYCIN HCL 1 G
1500 VIAL (EA) INTRAVENOUS ONCE
Refills: 0 | Status: COMPLETED | OUTPATIENT
Start: 2024-03-06 | End: 2024-03-06

## 2024-03-06 RX ORDER — DEXTROSE 50 % IN WATER 50 %
25 SYRINGE (ML) INTRAVENOUS ONCE
Refills: 0 | Status: DISCONTINUED | OUTPATIENT
Start: 2024-03-06 | End: 2024-03-08

## 2024-03-06 RX ORDER — METRONIDAZOLE 500 MG
500 TABLET ORAL EVERY 8 HOURS
Refills: 0 | Status: DISCONTINUED | OUTPATIENT
Start: 2024-03-06 | End: 2024-03-08

## 2024-03-06 RX ORDER — INSULIN GLARGINE 100 [IU]/ML
15 INJECTION, SOLUTION SUBCUTANEOUS AT BEDTIME
Refills: 0 | Status: DISCONTINUED | OUTPATIENT
Start: 2024-03-06 | End: 2024-03-07

## 2024-03-06 RX ORDER — INSULIN LISPRO 100/ML
5 VIAL (ML) SUBCUTANEOUS
Refills: 0 | Status: DISCONTINUED | OUTPATIENT
Start: 2024-03-06 | End: 2024-03-08

## 2024-03-06 RX ORDER — DEXTROSE 50 % IN WATER 50 %
12.5 SYRINGE (ML) INTRAVENOUS ONCE
Refills: 0 | Status: DISCONTINUED | OUTPATIENT
Start: 2024-03-06 | End: 2024-03-08

## 2024-03-06 RX ORDER — GLUCAGON INJECTION, SOLUTION 0.5 MG/.1ML
1 INJECTION, SOLUTION SUBCUTANEOUS ONCE
Refills: 0 | Status: DISCONTINUED | OUTPATIENT
Start: 2024-03-06 | End: 2024-03-08

## 2024-03-06 RX ORDER — VANCOMYCIN HCL 1 G
1500 VIAL (EA) INTRAVENOUS EVERY 12 HOURS
Refills: 0 | Status: DISCONTINUED | OUTPATIENT
Start: 2024-03-06 | End: 2024-03-07

## 2024-03-06 RX ORDER — SODIUM CHLORIDE 9 MG/ML
3000 INJECTION, SOLUTION INTRAVENOUS ONCE
Refills: 0 | Status: COMPLETED | OUTPATIENT
Start: 2024-03-06 | End: 2024-03-06

## 2024-03-06 RX ORDER — DEXTROSE 50 % IN WATER 50 %
15 SYRINGE (ML) INTRAVENOUS ONCE
Refills: 0 | Status: DISCONTINUED | OUTPATIENT
Start: 2024-03-06 | End: 2024-03-08

## 2024-03-06 RX ORDER — INSULIN LISPRO 100/ML
VIAL (ML) SUBCUTANEOUS AT BEDTIME
Refills: 0 | Status: DISCONTINUED | OUTPATIENT
Start: 2024-03-06 | End: 2024-03-08

## 2024-03-06 RX ORDER — ACETAMINOPHEN 500 MG
650 TABLET ORAL EVERY 6 HOURS
Refills: 0 | Status: DISCONTINUED | OUTPATIENT
Start: 2024-03-06 | End: 2024-03-08

## 2024-03-06 RX ADMIN — Medication 8: at 17:06

## 2024-03-06 RX ADMIN — Medication 650 MILLIGRAM(S): at 13:44

## 2024-03-06 RX ADMIN — Medication 100 MILLIGRAM(S): at 21:50

## 2024-03-06 RX ADMIN — Medication 5 UNIT(S): at 17:05

## 2024-03-06 RX ADMIN — Medication 300 MILLIGRAM(S): at 17:06

## 2024-03-06 RX ADMIN — Medication 1500 MILLIGRAM(S): at 12:37

## 2024-03-06 RX ADMIN — INSULIN GLARGINE 15 UNIT(S): 100 INJECTION, SOLUTION SUBCUTANEOUS at 21:49

## 2024-03-06 RX ADMIN — Medication 300 MILLIGRAM(S): at 10:43

## 2024-03-06 RX ADMIN — SODIUM CHLORIDE 3000 MILLILITER(S): 9 INJECTION, SOLUTION INTRAVENOUS at 10:05

## 2024-03-06 RX ADMIN — Medication 100 MILLIGRAM(S): at 13:28

## 2024-03-06 RX ADMIN — Medication 650 MILLIGRAM(S): at 23:52

## 2024-03-06 NOTE — H&P ADULT - NSHPSOCIALHISTORY_GEN_ALL_CORE
Lives with girlfriend and her children.  Works as manager in [employer]  Denies any smoking or drug use; drink occasionally last being 2 weeks ago

## 2024-03-06 NOTE — ED STATDOCS - CLINICAL SUMMARY MEDICAL DECISION MAKING FREE TEXT BOX
Patient evaluated in intake for wound check.  Presenting with L diabetic foot ulcer and tracking cellulitis of the left lower extremity.  Pulses grossly intact.  Mild purulent discharge from the lateral plantar wound 2cm x 2cm, no apparent gangrene. Patient presents with prescription pad from Dr. Jain's office requesting IV antibiotics, concern for subcutaneous gas, and possible admission.  Patient escalated to [facility] for further evaluation and treatment.

## 2024-03-06 NOTE — H&P ADULT - ASSESSMENT
58 year old male with with Diabetes presented with left leg and foot swelling, redness and feeling of tightness. As per patient he scratched his left shin 2-3 days ago and awoke this morning with swelling and redness.  Leukocytosis and with Xray suggestive of osteomyelitis left first MTP    # Cellulitis Left leg  # Osteomyelitis  # DIabetic Plantar ulcer  # uncontrolled Dibates with Hyperglycemia 58 year old male with with Diabetes presented with left leg and foot swelling, redness and feeling of tightness. As per patient he scratched his left shin 2-3 days ago and awoke this morning with swelling and redness.  Leukocytosis and with Xray suggestive of osteomyelitis left first MTP.  Received 3LN IVF and Vancomycin in ER      # Cellulitis Left leg  # Osteomyelitis Left first MTP ?Chronic  # Diabetic Left Plantar ulcer  - Admit to any medical bed  - IV antibiotics (Metronidazole, Levaquin and Vancomycin) given PCN allergy  - Blood cultures  - Monitor for fever and trend WBC  - MR to evaluate for Osteomyelitis  - Podiatry evaluation    # Uncontrolled Diabetes with Hyperglycemia  # Hyponatremia  - Glargine 15U and premeal Lispro 5U three times a day  - Blood glucose monitoring with sliding scale Lispro  - A1c in am    # Obesity  # BMI 32  - Nutrition consult  VTE Prophylaxis - Intermittent venous compression devices    Disposition - Pending clinical course; anticipate home upon discharge with either PO or IV antibiotics

## 2024-03-06 NOTE — H&P ADULT - NSHPPHYSICALEXAM_GEN_ALL_CORE
OBJECTIVE:  Vital Signs Last 24 Hrs  T(C): 37.3 (06 Mar 2024 08:38), Max: 37.3 (06 Mar 2024 08:38)  T(F): 99.1 (06 Mar 2024 08:38), Max: 99.1 (06 Mar 2024 08:38)  HR: 92 (06 Mar 2024 11:38) (92 - 96)  BP: 135/79 (06 Mar 2024 11:38) (135/79 - 138/90)  BP(mean): --  RR: 20 (06 Mar 2024 08:38) (20 - 20)  SpO2: 97% (06 Mar 2024 11:38) (97% - 98%)    Parameters below as of 06 Mar 2024 11:38  Patient On (Oxygen Delivery Method): room air        PHYSICAL EXAMINATION  General: Middle aged male sitting up on stretcher, comfortable  HEENT:  Anicteric sclera  NECK:  Supple  CVS: regular rate and rhythm S1 S2  RESP:  Fair air entry bilaterally  GI:  Soft nondistended nontender BS+  : No suprapubic tenderness  MSK:  Left lower leg with erythema till below knee, warm and tender with scan anteriorly. Left foot with plantar ulcer and deformed toe.  CNS:  Awake, oriented, Fluent speech, follow commands  PSYCH:  Fair mood

## 2024-03-06 NOTE — ED ADULT NURSE REASSESSMENT NOTE - NS ED NURSE REASSESS COMMENT FT1
Gave report to Sonia BURGESS in CDU, pt a&ox4, VSS, respirations even and unlabored on room air, PRN Tylenol given for oral temp 99.5, Metronidazole running, no distress noted.

## 2024-03-06 NOTE — ED ADULT TRIAGE NOTE - CHIEF COMPLAINT QUOTE
left lateral aspect of foot, hx diabetes, dr del cid sent pt in for evaluation. wound with redness tracing up mid calf

## 2024-03-06 NOTE — ED PROVIDER NOTE - NS ED MD DISPO SPECIAL CONSIDERATION1
Pt's kerrie calls to ask for nicotine patches for pt. He want's to try to quit smoking.  Please send to [facility] in [place]
None

## 2024-03-06 NOTE — ED ADULT NURSE NOTE - OBJECTIVE STATEMENT
Pt a&ox4 complains of wound to left foot and lower leg. Pt was sent to Emergency Department by MD Jain podiatrist. Pt notes "bashing his foot" when trying to get into his truck on 3/4/24. Pt's left extremity has redness and an abrasion to top of foot. Pt's VSS, respirations even and unlabored on room air, no distress noted.

## 2024-03-06 NOTE — H&P ADULT - HISTORY OF PRESENT ILLNESS
58 year old male with with Diabetes presented with left leg and foot swelling, redness and feeling of tightness. As per patient he scratched his left shin 2-3 days ago and awoke this morning with swelling and redness. Denies any pain or dizziness. He does have a chronic left heel ulcer which is being managed by his podiatrist and  has been wearing a boot.  Uses Insulin before dinner sliding scale. Relates allergy to PCN - ' I ' during childhood.  58 year old male with Diabetes presented with left leg and foot swelling, redness and feeling of tightness. As per patient he scratched his left shin 2-3 days ago and awoke this morning with swelling and redness. Denies any pain or dizziness. He does have a chronic left heel ulcer which is being managed by his podiatrist and  has been wearing a boot.  Uses Insulin 10-12U  before dinner sliding scale. Relates allergy to PCN - ' I ' during childhood.

## 2024-03-06 NOTE — ED PROVIDER NOTE - CLINICAL SUMMARY MEDICAL DECISION MAKING FREE TEXT BOX
58 wm pmh iddm  with chronic left foot ulcer with cellulitis anterior left leg;  labs, culture, iv abx, xray     podiatry consult

## 2024-03-06 NOTE — ED PROVIDER NOTE - OBJECTIVE STATEMENT
58-year-old male past medical history of diabetes comes to the ED with a left diabetic foot ulcer.  Patient seen by Dr. Jain of podiatry and sent to the ED for evaluation for possible  IV antibiotics. 58-year-old male past medical history of diabetes comes to the ED with a left diabetic foot ulcer and wound on lower leg.  Patient seen by Dr. Jain office of podiatry with xray of foot and sent to ed for evaluation of diabetic foot ulcer and cellulitis;  pt with allergy to penicillin; .

## 2024-03-06 NOTE — ED ADULT NURSE REASSESSMENT NOTE - NS ED NURSE REASSESS COMMENT FT1
Pt a&ox4, VSS, respirations even and unlabored on room air, IVF and Vancomycin running, no distress noted.

## 2024-03-06 NOTE — ED ADULT NURSE NOTE - NSFALLUNIVINTERV_ED_ALL_ED
Bed/Stretcher in lowest position, wheels locked, appropriate side rails in place/Call bell, personal items and telephone in reach/Instruct patient to call for assistance before getting out of bed/chair/stretcher/Non-slip footwear applied when patient is off stretcher/[place] to call system/Physically safe environment - no spills, clutter or unnecessary equipment/Purposeful proactive rounding/Room/bathroom lighting operational, light cord in reach

## 2024-03-07 LAB
A1C WITH ESTIMATED AVERAGE GLUCOSE RESULT: 14.1 % — HIGH (ref 4–5.6)
ALBUMIN SERPL ELPH-MCNC: 3.2 G/DL — LOW (ref 3.3–5.2)
ALP SERPL-CCNC: 90 U/L — SIGNIFICANT CHANGE UP (ref 40–120)
ALT FLD-CCNC: 6 U/L — SIGNIFICANT CHANGE UP
ANION GAP SERPL CALC-SCNC: 12 MMOL/L — SIGNIFICANT CHANGE UP (ref 5–17)
AST SERPL-CCNC: 9 U/L — SIGNIFICANT CHANGE UP
BILIRUB SERPL-MCNC: 0.9 MG/DL — SIGNIFICANT CHANGE UP (ref 0.4–2)
BUN SERPL-MCNC: 12.2 MG/DL — SIGNIFICANT CHANGE UP (ref 8–20)
CALCIUM SERPL-MCNC: 8.5 MG/DL — SIGNIFICANT CHANGE UP (ref 8.4–10.5)
CHLORIDE SERPL-SCNC: 99 MMOL/L — SIGNIFICANT CHANGE UP (ref 96–108)
CO2 SERPL-SCNC: 23 MMOL/L — SIGNIFICANT CHANGE UP (ref 22–29)
CREAT SERPL-MCNC: 1.17 MG/DL — SIGNIFICANT CHANGE UP (ref 0.5–1.3)
CULTURE RESULTS: SIGNIFICANT CHANGE UP
EGFR: 73 ML/MIN/1.73M2 — SIGNIFICANT CHANGE UP
ESTIMATED AVERAGE GLUCOSE: 358 MG/DL — HIGH (ref 68–114)
GLUCOSE BLDC GLUCOMTR-MCNC: 126 MG/DL — HIGH (ref 70–99)
GLUCOSE BLDC GLUCOMTR-MCNC: 192 MG/DL — HIGH (ref 70–99)
GLUCOSE BLDC GLUCOMTR-MCNC: 270 MG/DL — HIGH (ref 70–99)
GLUCOSE BLDC GLUCOMTR-MCNC: 320 MG/DL — HIGH (ref 70–99)
GLUCOSE SERPL-MCNC: 264 MG/DL — HIGH (ref 70–99)
HCT VFR BLD CALC: 37.8 % — LOW (ref 39–50)
HGB BLD-MCNC: 13.7 G/DL — SIGNIFICANT CHANGE UP (ref 13–17)
MCHC RBC-ENTMCNC: 30.2 PG — SIGNIFICANT CHANGE UP (ref 27–34)
MCHC RBC-ENTMCNC: 36.2 GM/DL — HIGH (ref 32–36)
MCV RBC AUTO: 83.3 FL — SIGNIFICANT CHANGE UP (ref 80–100)
PLATELET # BLD AUTO: 188 K/UL — SIGNIFICANT CHANGE UP (ref 150–400)
POTASSIUM SERPL-MCNC: 4.3 MMOL/L — SIGNIFICANT CHANGE UP (ref 3.5–5.3)
POTASSIUM SERPL-SCNC: 4.3 MMOL/L — SIGNIFICANT CHANGE UP (ref 3.5–5.3)
PROT SERPL-MCNC: 5.9 G/DL — LOW (ref 6.6–8.7)
RBC # BLD: 4.54 M/UL — SIGNIFICANT CHANGE UP (ref 4.2–5.8)
RBC # FLD: 12.2 % — SIGNIFICANT CHANGE UP (ref 10.3–14.5)
SODIUM SERPL-SCNC: 133 MMOL/L — LOW (ref 135–145)
SPECIMEN SOURCE: SIGNIFICANT CHANGE UP
WBC # BLD: 8.84 K/UL — SIGNIFICANT CHANGE UP (ref 3.8–10.5)
WBC # FLD AUTO: 8.84 K/UL — SIGNIFICANT CHANGE UP (ref 3.8–10.5)

## 2024-03-07 PROCEDURE — 99223 1ST HOSP IP/OBS HIGH 75: CPT

## 2024-03-07 PROCEDURE — 99233 SBSQ HOSP IP/OBS HIGH 50: CPT

## 2024-03-07 RX ORDER — CEFEPIME 1 G/1
2000 INJECTION, POWDER, FOR SOLUTION INTRAMUSCULAR; INTRAVENOUS EVERY 8 HOURS
Refills: 0 | Status: DISCONTINUED | OUTPATIENT
Start: 2024-03-07 | End: 2024-03-08

## 2024-03-07 RX ORDER — ACETAMINOPHEN 500 MG
1000 TABLET ORAL ONCE
Refills: 0 | Status: COMPLETED | OUTPATIENT
Start: 2024-03-07 | End: 2024-03-07

## 2024-03-07 RX ORDER — INSULIN GLARGINE 100 [IU]/ML
17 INJECTION, SOLUTION SUBCUTANEOUS AT BEDTIME
Refills: 0 | Status: DISCONTINUED | OUTPATIENT
Start: 2024-03-07 | End: 2024-03-08

## 2024-03-07 RX ORDER — KETOROLAC TROMETHAMINE 30 MG/ML
15 SYRINGE (ML) INJECTION ONCE
Refills: 0 | Status: DISCONTINUED | OUTPATIENT
Start: 2024-03-07 | End: 2024-03-07

## 2024-03-07 RX ORDER — VANCOMYCIN HCL 1 G
1000 VIAL (EA) INTRAVENOUS EVERY 12 HOURS
Refills: 0 | Status: DISCONTINUED | OUTPATIENT
Start: 2024-03-07 | End: 2024-03-08

## 2024-03-07 RX ADMIN — Medication 300 MILLIGRAM(S): at 05:14

## 2024-03-07 RX ADMIN — Medication 650 MILLIGRAM(S): at 08:32

## 2024-03-07 RX ADMIN — Medication 8: at 08:30

## 2024-03-07 RX ADMIN — Medication 400 MILLIGRAM(S): at 04:59

## 2024-03-07 RX ADMIN — Medication 5 UNIT(S): at 08:30

## 2024-03-07 RX ADMIN — Medication 6: at 12:45

## 2024-03-07 RX ADMIN — Medication 650 MILLIGRAM(S): at 16:03

## 2024-03-07 RX ADMIN — Medication 250 MILLIGRAM(S): at 17:28

## 2024-03-07 RX ADMIN — CEFEPIME 2000 MILLIGRAM(S): 1 INJECTION, POWDER, FOR SOLUTION INTRAMUSCULAR; INTRAVENOUS at 22:02

## 2024-03-07 RX ADMIN — Medication 100 MILLIGRAM(S): at 05:14

## 2024-03-07 RX ADMIN — Medication 5 UNIT(S): at 12:44

## 2024-03-07 RX ADMIN — Medication 15 MILLIGRAM(S): at 20:26

## 2024-03-07 RX ADMIN — Medication 100 MILLIGRAM(S): at 13:15

## 2024-03-07 RX ADMIN — Medication 100 MILLIGRAM(S): at 22:02

## 2024-03-07 RX ADMIN — Medication 650 MILLIGRAM(S): at 22:43

## 2024-03-07 RX ADMIN — INSULIN GLARGINE 17 UNIT(S): 100 INJECTION, SOLUTION SUBCUTANEOUS at 22:00

## 2024-03-07 RX ADMIN — Medication 5 UNIT(S): at 16:12

## 2024-03-07 RX ADMIN — CEFEPIME 2000 MILLIGRAM(S): 1 INJECTION, POWDER, FOR SOLUTION INTRAMUSCULAR; INTRAVENOUS at 13:15

## 2024-03-07 NOTE — CONSULT NOTE ADULT - SUBJECTIVE AND OBJECTIVE BOX
[facility]  INFECTIOUS DISEASES at [place] / [place] / [place]  =======================================================                               James Esteves MD#   Selwyn Callejas MD*                             Kavita Reynolds MD*   Tammy Smith MD*                              Professor Emeritus:  Dr Siva Stevenson MD^            Diplomates American Board of Internal Medicine & Infectious Diseases                # [place] Office - Appt - Tel  [phone] Fax [phone]                * [place] Office - Appt - Tel [phone] Fax [phone]               ^ [place] Office - Appt - Tel  [phone] Fax [phone]                                  Hospital Consult line:  [phone]  =======================================================      [id number]  ZEINAB SCHNEIDER    CC: Patient is a 58y old  Male who presents with a chief complaint of Foot infection (06 Mar 2024 13:07)      58y  Male with h/o Diabetes. Patient presented with left leg and foot swelling, redness and feeling of tightness. He reports he was on the back of his  truck and bumped his left leg while coming on and off the bed. Few days later he woke up with left leg redness and started to travel down to his foot. Patient decided to come to the ER. In the ER patient is febrile to 103F, with leukocytosis to 12.7k. MRI left foot reporting acute osteomyelitis involving the fifth metatarsal base.  Started on Levofloxacin, Metronidazole, and Vancomycin. ID input requested.       Past Medical & Surgical Hx:  Diabetes mellitus  H/O diabetic neuropathy  History of partial amputation of toe      Social Hx:  Denies smoking       FAMILY HISTORY:  FHx: diabetes mellitus (Grandparent)      Allergies  penicillin (Anaphylaxis)  sulfa drugs (Other)  Intolerances  Keflex (Other)       REVIEW OF SYSTEMS:  CONSTITUTIONAL:  No Fever or chills  HEENT:  No diplopia or blurred vision.  No earache, sore throat or runny nose.  CARDIOVASCULAR:  No chest pain  RESPIRATORY:  No cough, shortness of breath  GASTROINTESTINAL:  No nausea, vomiting or diarrhea.  GENITOURINARY:  No dysuria, frequency or urgency. No Blood in urine  MUSCULOSKELETAL:  no joint aches, no muscle pain  SKIN:  left foot ulcer   NEUROLOGIC:  No Headaches      Physical Exam:  GEN: NAD  HEENT: normocephalic and atraumatic. EOMI. PERRL.    NECK: Supple.   LUNGS: CTA B/L.  HEART: RRR  ABDOMEN: Soft, NT, ND.  +BS.    : No CVA tenderness  EXTREMITIES: Without  edema.  MSK: No joint swelling  NEUROLOGIC: No Focal Deficits   SKIN: No rash      Vitals:  T(F): 98 (07 Mar 2024 11:21), Max: 103 (07 Mar 2024 04:28)  HR: 81 (07 Mar 2024 11:21)  BP: 116/70 (07 Mar 2024 11:21)  RR: 18 (07 Mar 2024 11:21)  SpO2: 95% (07 Mar 2024 11:21) (92% - 97%)  temp max in last 48H T(F): , Max: 103 (03-07-24 @ 04:28)      Current Antibiotics:  levoFLOXacin IVPB 750 milliGRAM(s) IV Intermittent every 24 hours  metroNIDAZOLE  IVPB 500 milliGRAM(s) IV Intermittent every 8 hours  vancomycin  IVPB 1500 milliGRAM(s) IV Intermittent every 12 hours      Other medications:  dextrose 5%. 1000 milliLiter(s) IV Continuous <Continuous>  dextrose 5%. 1000 milliLiter(s) IV Continuous <Continuous>  dextrose 50% Injectable 25 Gram(s) IV Push once  dextrose 50% Injectable 12.5 Gram(s) IV Push once  dextrose 50% Injectable 25 Gram(s) IV Push once  glucagon  Injectable 1 milliGRAM(s) IntraMuscular once  insulin glargine Injectable (LANTUS) 15 Unit(s) SubCutaneous at bedtime  insulin lispro (ADMELOG) corrective regimen sliding scale   SubCutaneous three times a day before meals  insulin lispro (ADMELOG) corrective regimen sliding scale   SubCutaneous at bedtime  insulin lispro Injectable (ADMELOG) 5 Unit(s) SubCutaneous three times a day before meals                 13.7   8.84  )-----------( 188      ( 07 Mar 2024 02:49 )             37.8     03-07    133<L>  |  99  |  12.2  ----------------------------<  264<H>  4.3   |  23.0  |  1.17    Ca    8.5      07 Mar 2024 02:49    TPro  5.9<L>  /  Alb  3.2<L>  /  TBili  0.9  /  DBili  x   /  AST  9   /  ALT  6   /  AlkPhos  90  03-07    RECENT CULTURES:      WBC Count: 8.84 K/uL (03-07-24 @ 02:49)  WBC Count: 12.71 K/uL (03-06-24 @ 09:30)    Creatinine: 1.17 mg/dL (03-07-24 @ 02:49)  Creatinine: 1.02 mg/dL (03-06-24 @ 09:30)    C-Reactive Protein, Serum: 68 mg/L (03-06-24 @ 09:30)    A1C with Estimated Average Glucose (03.07.24 @ 02:49)    A1C with Estimated Average Glucose Result: 14.1 %   Estimated Average Glucose: 358 mg/dL    Urinalysis (03.06.24 @ 10:00)    Glucose Qualitative, Urine: >=1000 mg/dL   Blood, Urine: Negative   pH Urine: 6.0   Color: Yellow   Urine Appearance: Clear   Bilirubin: Negative   Ketone - Urine: 40 mg/dL   Specific Gravity: >1.030   Protein, Urine: Trace mg/dL   Urobilinogen: 0.2 mg/dL   Nitrite: Negative   Leukocyte Esterase Concentration: Negative          < from: Xray Tibia + Fibula 2 Views, Left (03.06.24 @ 10:45) >  ACC: [id number] EXAM:  XR FOOT COMP MIN 3 VIEWS LT   ORDERED BY: ZEINAB RODNEY     ACC: [id number] EXAM:  XR TIB FIB AP LAT 2 VIEWS LT   ORDERED BY: ZEINAB RODNEY     ACC: [id number] EXAM:  XR ANKLE COMP MIN 3 VIEWS LT   ORDERED BY: JASMYNE BANEGAS     PROCEDURE DATE:  03/06/2024      INTERPRETATION:  Left tib-fib, ankle, and foot. Patient has a wound of   the left foot.    Left tib-fib. 2 views. 4 images.    There is advanced knee degeneration with loss of disc height and   irregularityof the opposing surfaces. No fracture.    Left ankle. 3 views. Arterial calcifications are noted. There is mild   degeneration around the articular tibia and malleolar tips. There is   spurring of the distal dorsal talus. No fracture.    Left foot. 3views. Arterial calcifications are noted. There is an ulcer   of the mid plantar surface. On November 21, 2021 there may have been in   the interpretation of most of the proximal phalanx of the great toe.    Presently there is now a destructive process at the first MTP joint   consistent with osteomyelitis.    IMPRESSION: Mid plantar ulcer presently seen.    Suspicion of osteomyelitis around the first MTP joint. Arterial   calcification.    --- End of Report ---    < end of copied text >         < from: MR Foot No Cont, Left (03.06.24 @ 23:28) >  ACC: [id number] EXAM:  MR FOOT LT   ORDERED BY: NICK AYALA     PROCEDURE DATE:  03/06/2024      INTERPRETATION:  Clinical indication: Left foot wound and left lower   extremity cellulitis.    Multiplanar multisequence noncontrast MRI of the left foot was performed   from the level of the toes to the level of the navicular cuneiform   articulation.    Correlation is made with radiographs from March 6, 2024.    FINDINGS:    There is diffuse subcutaneous edema throughout the dorsum of the foot   suggestive of cellulitis.    There is partially imaged cutaneous wound with prominent soft tissue   edema along the plantar lateral aspect of the foot. Subjacent to this   wound there is partially imaged osseous edema within the fifth metatarsal   base with corresponding mild decreased T1 marrow signal consistent with   acute osteomyelitis. Dedicated imaging of the hindfoot is recommended to   evaluate the osseous structures proximal to this region.    There is marked soft tissue thickening along the plantar medial margin of   the first metatarsal phalangeal joint with cutaneous irregularity.   Subjacent to this there is osseous erosion and destruction of the first   metatarsal head corresponding to the radiographic findings. There is   surrounding osseous edema within the first metatarsal head and within the   tibial sesamoid. There is small associated joint fluid. Mild osseous   edema is also seen along the plantar base of the first distal phalanx.   Findings are consistent with the sequela of prior osteomyelitis given the   adjacent wound. Findings may be acute on chronic in nature. The first   distal phalanx is dorsally dislocated.    Marrow signal within the remainder of the foot is otherwise preserved.   There is an additional plantar cutaneous wound at the level of the distal   diaphysis of the fourth metatarsal with associated susceptibility   artifact which may related to foci of air or implanted material,   correlate with physical exam.    There is disruption of the flexor hallucis longus tendon at the region of   osseous erosion and destruction of the first metatarsal head. The   remaining flexor and extensor tendons are otherwise intact. There is   fatty atrophy and edema throughout the visualized plantar musculature.   Lisfranc ligament is intact.    IMPRESSION:    Partially imaged acute osteomyelitis involving the fifth metatarsal base.   Dedicated imaging of the hindfoot is recommended to fully characterize.   Overlying soft tissue edema and subcutaneous wound.    Osseous erosion and destruction of the first metatarsal head with   surrounding osseous edema. Mild osseous edema is also seen along the base   of the first distal phalanx and within the tibial sesamoid. There is an   overlying small cutaneous wound in this region. Findings are consistent   with the sequela of prior osteomyelitis given the adjacent wound.   Findings may be acute on chronic in nature.    Dorsal dislocation of the first distal phalanx. Disruption of the flexor   hallucis longus tendon at the level of the metatarsal head.    Additional plantar cutaneous wound at the level of the distal diaphysis   of the fourth metatarsal with associated susceptibility artifact which   may related to foci of air or implanted material, correlate with physical   exam.    --- End of Report ---  < end of copied text >            
Podiatry HPI: 58M PMH DM with complaint of left foot wound and left lower leg cellulitis. The patient was seen this morning by Dr. Roberts and recommended he come to ED due to concern for worsening left lower extremity infection. The patient states that he has had left lateral foot wound for an extended period of time and has been treated with wound care by Dr. Jain. States that this week, the wound has worsened with some drainage noted and worsening redness and swelling to the foot. The patient also relates that he hit his shin on metal two days ago, and has a small wound to the shin. Redness and warmth are spreading proximally along the lower leg at this time. No other pedal complaints. Denies any constitutional symptoms at this time    HPI:        PMH:Diabetes mellitus    H/O diabetic neuropathy      Allergies: penicillin (Anaphylaxis)  sulfa drugs (Other)  Keflex (Other)    Medications: vancomycin  IVPB 1500 milliGRAM(s) IV Intermittent Once    FH:FHx: diabetes mellitus (Grandparent)      PSX: History of partial amputation of toe      SH: vancomycin  IVPB 1500 milliGRAM(s) IV Intermittent Once      Vital Signs Last 24 Hrs  T(C): 37.3 (06 Mar 2024 08:38), Max: 37.3 (06 Mar 2024 08:38)  T(F): 99.1 (06 Mar 2024 08:38), Max: 99.1 (06 Mar 2024 08:38)  HR: 96 (06 Mar 2024 08:38) (96 - 96)  BP: 138/90 (06 Mar 2024 08:38) (138/90 - 138/90)  BP(mean): --  RR: 20 (06 Mar 2024 08:38) (20 - 20)  SpO2: 98% (06 Mar 2024 08:38) (98% - 98%)        LABS                        15.4   12.71 )-----------( 251      ( 06 Mar 2024 09:30 )             42.5               03-06    131<L>  |  95<L>  |  13.2  ----------------------------<  366<H>  4.1   |  21.0<L>  |  1.02    Ca    8.9      06 Mar 2024 09:30    TPro  7.1  /  Alb  3.8  /  TBili  0.9  /  DBili  x   /  AST  8   /  ALT  8   /  AlkPhos  120  03-06      ROS  unremarkable outside HPI      PHYSICAL EXAM  LE Focused:    Vasc:  DP and PT pulses palpable 2/4. TG warm to warm with increased warmth to left foot and lower leg. Mild non-pitting edema noted.  Derm: Left lateral foot ulcer with serosanguineous drainage, probes deep but not to bone, with fibrotic tissue at wound base, no malodor. Erythema extending from left foot proximally along the lower leg. Left anterior shin wound with granular base, no drainage.   Neuro: Protective sensation diminished  MSK: Muscle strength 5/5 to extrinsic muscle groups. History of right foot amputations      IMAGING:   ACC: [id number] EXAM:  XR FOOT COMP MIN 3 VIEWS LT   ORDERED BY: ZEINAB RODNEY     ACC: [id number] EXAM:  XR TIB FIB AP LAT 2 VIEWS LT   ORDERED BY: ZEINAB RODNEY     ACC: [id number] EXAM:  XR ANKLE COMP MIN 3 VIEWS LT   ORDERED BY: JASMYNE BANEGAS     PROCEDURE DATE:  03/06/2024          INTERPRETATION:  Left tib-fib, ankle, and foot. Patient has a wound of   the left foot.    Left tib-fib. 2 views. 4 images.    There is advanced knee degeneration with loss of disc height and   irregularityof the opposing surfaces. No fracture.    Left ankle. 3 views. Arterial calcifications are noted. There is mild   degeneration around the articular tibia and malleolar tips. There is   spurring of the distal dorsal talus. No fracture.    Left foot. 3views. Arterial calcifications are noted. There is an ulcer   of the mid plantar surface. On November 21, 2021 there may have been in   the interpretation of most of the proximal phalanx of the great toe.    Presently there is now a destructive process at the first MTP joint   consistent with osteomyelitis.    IMPRESSION: Mid plantar ulcer presently seen.    Suspicion of osteomyelitis around the first MTP joint. Arterial   calcification.    --- End of Report ---      CULTURES:   Pending      A:  Left foot ulcer  Cellulitis      P:   Patient evaluated and Chart reviewed   Discussed diagnosis and treatment with patient  Wound flushed with normal saline  Obtained wound culture to be sent to Lab  Applied betadine, DSD  Cellulitis to left lower extremity marked with skin marker  Reviewed xray - radiolucency consistent with foot ulcer, no evidence of soft tissue emphysema. osseous changes at 1st MPJ likely due to previous wounds to the area that have healed rather than current clinical presentation  Recommend MRI w/ contrast to rule out abscess to left foot  Recommend IV Abx  Recommend endocrinology consult due to patient's glucose levels and patient stating that his sugars tend to run this high  Noted elevated WBC  Will continue to monitor for improvement of cellulitis and labs  Offloading to bilateral Heels while in bed  Discussed importance of daily foot examinations and proper shoe gear and to importance of lower Fasting Blood Glucose levels.   Podiatry to follow while in house.  Discussed with Attending Dr. Roberts

## 2024-03-07 NOTE — CONSULT NOTE ADULT - ASSESSMENT
58y  Male with h/o Diabetes. Patient presented with left leg and foot swelling, redness and feeling of tightness. He reports he was on the back of his  truck and bumped his left leg while coming on and off the bed. Few days later he woke up with left leg redness and started to travel down to his foot. Patient decided to come to the ER. In the ER patient is febrile to 103F, with leukocytosis to 12.7k. MRI left foot reporting acute osteomyelitis involving the fifth metatarsal base.  Started on Levofloxacin, Metronidazole, and Vancomycin.    Left foot osteomyelitis   Left leg cellulitis  Fever   Leukocytosis   Uncontrolled DM, A1c 14.1  PCN allergy       - Blood cultures pending  - Foot cultures pending   - CRP 68  - UA 3/6 not consistent with UTI   - CRP 68  - ESR ordered   - Continue Metronidazole  - Continue Vancomycin  - Monitor trough, will order next trough  - Monitor for Vancomycin toxicity   - Vancomycin required at this time pending culture results  - Monitor Cr while on Vancomycin, will order Cr for AM  - D/C Levofloxacin   - Will trial Cefepime  - Tolerated keflex, Meropenem and Ertapenem in the past.   - Hold off on PICC/Midline for now unless needed for reasons other than infectious diseases  - Follow up cultures  - Trend Fever  - Trend WBC      Thank you for allowing me to participate in the care of your patient.   Will Follow    Discussed treatment plan with: Dr Amaro and clinical pharmacy   
Available

## 2024-03-07 NOTE — PROGRESS NOTE ADULT - ASSESSMENT
58 year old male with with Diabetes presented with left leg and foot swelling, redness and feeling of tightness. As per patient he scratched his left shin 2-3 days ago and awoke this morning with swelling and redness.  Leukocytosis and with Xray suggestive of osteomyelitis left first MTP.  Received 3LN IVF and Vancomycin in ER      # Cellulitis Left leg  # Osteomyelitis Left first MTP ?Chronic  # Diabetic Left Plantar ulcer  - MRI suggestive of 5th toe OM  - podiatry following, no surgical intervention at this time  - ID consulted  - c/w Metronidazole and Vancomycin  - trial of cefepime per ID  - pt with PCN allergy as child, close monitoring while abx switched  - f/u wound clx  - Blood cultures ngtd  - Monitor for fever and trend WBC    # Uncontrolled Diabetes with Hyperglycemia  # Hyponatremia  - Glargine 15U and premeal Lispro 5U three times a day  - Blood glucose monitoring with sliding scale Lispro  - A1c in am    VTE Prophylaxis - Intermittent venous compression devices       58 year old male with with Diabetes presented with left leg and foot swelling, redness and feeling of tightness. As per patient he scratched his left shin 2-3 days ago and awoke this morning with swelling and redness.  Leukocytosis and with Xray suggestive of osteomyelitis left first MTP.  Received 3LN IVF and Vancomycin in ER      # Cellulitis Left leg  # Osteomyelitis Left first MTP ?Chronic  # Diabetic Left Plantar ulcer  - MRI suggestive of 5th toe OM  - podiatry following, no surgical intervention at this time  - ID consulted  - c/w Metronidazole and Vancomycin  - trial of cefepime per ID  - pt with PCN allergy as child, close monitoring while abx switched  - f/u wound clx  - Blood cultures ngtd  - Monitor for fever and trend WBC    # Uncontrolled Diabetes with Hyperglycemia  # Hyponatremia  - a1c 14  - inc Glargine to 17U and c/w premeal Lispro 5U three times a day  - Blood glucose monitoring with sliding scale Lispro    VTE Prophylaxis - Intermittent venous compression devices

## 2024-03-07 NOTE — PROGRESS NOTE ADULT - SUBJECTIVE AND OBJECTIVE BOX
Podiatry HPI: 58M PMH DM with complaint of left foot wound and left lower leg cellulitis. The patient was seen this morning by Dr. Roberts and recommended he come to ED due to concern for worsening left lower extremity infection. The patient states that he has had left lateral foot wound for an extended period of time and has been treated with wound care by Dr. Jain. States that this week, the wound has worsened with some drainage noted and worsening redness and swelling to the foot. The patient also relates that he hit his shin on metal two days ago, and has a small wound to the shin. Redness and warmth are spreading proximally along the lower leg at this time. No other pedal complaints. Denies any constitutional symptoms at this time    Interval: Patient seen resting bedside with family present; denies overnight acute events or constitutional symptoms. Inquires about MRI results. Denies further pedal complaints.     PMH:Diabetes mellitus    H/O diabetic neuropathy      Allergies: penicillin (Anaphylaxis)  sulfa drugs (Other)  Keflex (Other)    Patient admits to  (-) Fevers, (-) Chills, (-) Nausea, (-) Vomiting, (-) Shortness of Breath (-) calf pain (-) chest pain     Medications acetaminophen     Tablet .. 650 milliGRAM(s) Oral every 6 hours PRN  dextrose 5%. 1000 milliLiter(s) IV Continuous <Continuous>  dextrose 5%. 1000 milliLiter(s) IV Continuous <Continuous>  dextrose 50% Injectable 25 Gram(s) IV Push once  dextrose 50% Injectable 12.5 Gram(s) IV Push once  dextrose 50% Injectable 25 Gram(s) IV Push once  dextrose Oral Gel 15 Gram(s) Oral once PRN  glucagon  Injectable 1 milliGRAM(s) IntraMuscular once  insulin glargine Injectable (LANTUS) 15 Unit(s) SubCutaneous at bedtime  insulin lispro (ADMELOG) corrective regimen sliding scale   SubCutaneous three times a day before meals  insulin lispro (ADMELOG) corrective regimen sliding scale   SubCutaneous at bedtime  insulin lispro Injectable (ADMELOG) 5 Unit(s) SubCutaneous three times a day before meals  levoFLOXacin IVPB 750 milliGRAM(s) IV Intermittent every 24 hours  metroNIDAZOLE  IVPB 500 milliGRAM(s) IV Intermittent every 8 hours  vancomycin  IVPB 1500 milliGRAM(s) IV Intermittent every 12 hours    FHFHx: diabetes mellitus (Grandparent)    ,   PMHDiabetes mellitus    H/O diabetic neuropathy       PSHHistory of partial amputation of toe        Labs                          13.7   8.84  )-----------( 188      ( 07 Mar 2024 02:49 )             37.8      03-07    133<L>  |  99  |  12.2  ----------------------------<  264<H>  4.3   |  23.0  |  1.17    Ca    8.5      07 Mar 2024 02:49    TPro  5.9<L>  /  Alb  3.2<L>  /  TBili  0.9  /  DBili  x   /  AST  9   /  ALT  6   /  AlkPhos  90  03-07     Vital Signs Last 24 Hrs  T(C): 36.7 (07 Mar 2024 11:21), Max: 39.4 (07 Mar 2024 04:28)  T(F): 98 (07 Mar 2024 11:21), Max: 103 (07 Mar 2024 04:28)  HR: 81 (07 Mar 2024 11:21) (75 - 95)  BP: 116/70 (07 Mar 2024 11:21) (115/56 - 130/71)  RR: 18 (07 Mar 2024 11:21) (18 - 18)  SpO2: 95% (07 Mar 2024 11:21) (92% - 97%)    Parameters below as of 07 Mar 2024 11:21  Patient On (Oxygen Delivery Method): room air    C-Reactive Protein, Serum: 68 mg/L (03-06-24 @ 09:30)   WBC Count: 8.84 K/uL (03-07-24 @ 02:49)    ROS unremarkable outside of HPI      PHYSICAL EXAM  LE Focused:    Vasc:  DP and PT pulses palpable 2/4. TG warm to warm with increased warmth to left foot and lower leg. Mild non-pitting edema noted.  Derm: Left lateral foot ulcer with serosanguineous drainage, probes deep but not to bone, with fibrotic tissue at wound base, no malodor. Erythema extending from left foot proximally until inferior to ankle (improved since yesterday ). Left anterior shin wound with granular base, no drainage.   Neuro: Protective sensation diminished  MSK: Muscle strength 5/5 to extrinsic muscle groups. History of right foot amputations      IMAGING:   ACC: [id number] EXAM:  XR FOOT COMP MIN 3 VIEWS LT   ORDERED BY: ZEINAB RODNEY     ACC: [id number] EXAM:  XR TIB FIB AP LAT 2 VIEWS LT   ORDERED BY: ZEINAB RODNEY     ACC: [id number] EXAM:  XR ANKLE COMP MIN 3 VIEWS LT   ORDERED BY: JASMYNE BANEGAS     PROCEDURE DATE:  03/06/2024          INTERPRETATION:  Left tib-fib, ankle, and foot. Patient has a wound of   the left foot.    Left tib-fib. 2 views. 4 images.    There is advanced knee degeneration with loss of disc height and   irregularityof the opposing surfaces. No fracture.    Left ankle. 3 views. Arterial calcifications are noted. There is mild   degeneration around the articular tibia and malleolar tips. There is   spurring of the distal dorsal talus. No fracture.    Left foot. 3views. Arterial calcifications are noted. There is an ulcer   of the mid plantar surface. On November 21, 2021 there may have been in   the interpretation of most of the proximal phalanx of the great toe.    Presently there is now a destructive process at the first MTP joint   consistent with osteomyelitis.    IMPRESSION: Mid plantar ulcer presently seen.    Suspicion of osteomyelitis around the first MTP joint. Arterial   calcification.    --- End of Report ---    < from: MR Foot No Cont, Left (03.06.24 @ 23:28) >  IMPRESSION:    Partially imaged acute osteomyelitis involving the fifth metatarsal base.   Dedicated imaging of the hindfoot is recommended to fully characterize.   Overlying soft tissue edema and subcutaneous wound.    Osseous erosion and destruction of the first metatarsal head with   surrounding osseous edema. Mild osseous edema is also seen along the base   of the first distal phalanx and within the tibial sesamoid. There is an   overlying small cutaneous wound in this region. Findings are consistent   with the sequela of prior osteomyelitis given the adjacent wound.   Findings may be acute on chronic in nature.    Dorsal dislocation of the first distal phalanx. Disruption of the flexor   hallucis longus tendon at the level of the metatarsal head.    Additional plantar cutaneous wound at the level of the distal diaphysis   of the fourth metatarsal with associated susceptibility artifact which   may related to foci of air or implanted material, correlate with physical   exam.    --- End of Report ---    < end of copied text >        CULTURES:   pending deep wound cx       A:  Left foot ulcer  Cellulitis      P:   Patient evaluated and Chart reviewed   Discussed diagnosis and treatment with patient  Wound flushed with normal saline  Pending deep wound culture   Applied betadine, DSD  Cellulitis to L. foot improving but not resolved yet   Reviewed xray - radiolucency consistent with foot ulcer, no evidence of soft tissue emphysema. osseous changes at 1st MPJ likely due to previous wounds to the area that have healed rather than current clinical presentation  Reviewed MRI; remarkable for OM to L. 5th met base and no abscess  Recommend IV Abx for tx of OM; no podiatric surgery planned   Recommend endocrinology consult due to patient's glucose levels and patient stating that his sugars tend to run this high  WBC improving   Will continue to monitor for improvement of cellulitis and labs  Offloading to bilateral Heels while in bed  Discussed importance of daily foot examinations and proper shoe gear and to importance of lower Fasting Blood Glucose levels.   Podiatry to follow while in house.  Discussed with Attending Dr. Roberts

## 2024-03-07 NOTE — PROGRESS NOTE ADULT - SUBJECTIVE AND OBJECTIVE BOX
[facility] Division of Medicine    SUBJECTIVE / OVERNIGHT EVENTS: Pt seen at the bedside.  Patient denies chest pain, SOB, abd pain, N/V, fever, chills, dysuria or any other complaints. All remainder ROS negative.     MEDICATIONS  (STANDING):  cefepime  Injectable. 2000 milliGRAM(s) IV Push every 8 hours  dextrose 5%. 1000 milliLiter(s) (50 mL/Hr) IV Continuous <Continuous>  dextrose 5%. 1000 milliLiter(s) (100 mL/Hr) IV Continuous <Continuous>  dextrose 50% Injectable 25 Gram(s) IV Push once  dextrose 50% Injectable 12.5 Gram(s) IV Push once  dextrose 50% Injectable 25 Gram(s) IV Push once  glucagon  Injectable 1 milliGRAM(s) IntraMuscular once  insulin glargine Injectable (LANTUS) 15 Unit(s) SubCutaneous at bedtime  insulin lispro (ADMELOG) corrective regimen sliding scale   SubCutaneous three times a day before meals  insulin lispro (ADMELOG) corrective regimen sliding scale   SubCutaneous at bedtime  insulin lispro Injectable (ADMELOG) 5 Unit(s) SubCutaneous three times a day before meals  metroNIDAZOLE  IVPB 500 milliGRAM(s) IV Intermittent every 8 hours  vancomycin  IVPB 1000 milliGRAM(s) IV Intermittent every 12 hours    MEDICATIONS  (PRN):  acetaminophen     Tablet .. 650 milliGRAM(s) Oral every 6 hours PRN Temp greater or equal to 38C (100.4F)  dextrose Oral Gel 15 Gram(s) Oral once PRN Blood Glucose LESS THAN 70 milliGRAM(s)/deciliter      I&O's Summary      PHYSICAL EXAM:  Vital Signs Last 24 Hrs  T(C): 38.1 (07 Mar 2024 17:23), Max: 39.5 (07 Mar 2024 15:31)  T(F): 100.6 (07 Mar 2024 17:23), Max: 103.1 (07 Mar 2024 15:31)  HR: 94 (07 Mar 2024 15:31) (81 - 95)  BP: 133/71 (07 Mar 2024 15:31) (115/56 - 133/71)  BP(mean): --  RR: 18 (07 Mar 2024 15:31) (18 - 18)  SpO2: 97% (07 Mar 2024 15:31) (92% - 97%)    Parameters below as of 07 Mar 2024 15:31  Patient On (Oxygen Delivery Method): room air          GENERAL: not in acute distress  HEENT:  Clear conjunctiva, PERRL, moist oral mucosa no pharyngeal injection or exudates, no cervical LAD  RESP:  Non-labored breathing pattern, lungs clear to ausculation, no wheezes or crackles appreciated  CV: Regular rate and rhythm, no murmurs appreciated, no lower extremity edema, peripheral pulses are 2+ bilaterally  GI: Soft, non-tender, non-distended  NEURO: Awake, alert, conversant, upper and lower extremity strength grossly intact  PSYCH: Calm, cooperative, A&Ox3  SKIN: Warm and dry, left LE cellulitis appears improved compared to where marker line is       LABS:                        13.7   8.84  )-----------( 188      ( 07 Mar 2024 02:49 )             37.8     03-07    133<L>  |  99  |  12.2  ----------------------------<  264<H>  4.3   |  23.0  |  1.17    Ca    8.5      07 Mar 2024 02:49    TPro  5.9<L>  /  Alb  3.2<L>  /  TBili  0.9  /  DBili  x   /  AST  9   /  ALT  6   /  AlkPhos  90  03-07    PT/INR - ( 06 Mar 2024 09:30 )   PT: 10.9 sec;   INR: 0.98 ratio         PTT - ( 06 Mar 2024 09:30 )  PTT:27.1 sec      Urinalysis Basic - ( 07 Mar 2024 02:49 )    Color: x / Appearance: x / SG: x / pH: x  Gluc: 264 mg/dL / Ketone: x  / Bili: x / Urobili: x   Blood: x / Protein: x / Nitrite: x   Leuk Esterase: x / RBC: x / WBC x   Sq Epi: x / Non Sq Epi: x / Bacteria: x        Culture - Surgical Swab (collected 06 Mar 2024 10:30)  Source: .Surgical Swab left foot ulcer  Preliminary Report (07 Mar 2024 15:05):    Moderate Staphylococcus aureus    Moderate Streptococcus agalactiae (Group B)    Culture - Urine (collected 06 Mar 2024 10:00)  Source: Clean Catch Clean Catch (Midstream)  Final Report (07 Mar 2024 15:17):    <10,000 CFU/mL Normal Urogenital Camila    Culture - Blood (collected 06 Mar 2024 09:40)  Source: .Blood Blood-Peripheral  Preliminary Report (07 Mar 2024 14:03):    No growth at 24 hours    Culture - Blood (collected 06 Mar 2024 09:30)  Source: .Blood Blood-Peripheral  Preliminary Report (07 Mar 2024 14:03):    No growth at 24 hours      CAPILLARY BLOOD GLUCOSE      POCT Blood Glucose.: 126 mg/dL (07 Mar 2024 16:11)  POCT Blood Glucose.: 270 mg/dL (07 Mar 2024 11:59)  POCT Blood Glucose.: 320 mg/dL (07 Mar 2024 08:05)  POCT Blood Glucose.: 245 mg/dL (06 Mar 2024 21:48)      IMAGING:

## 2024-03-08 ENCOUNTER — TRANSCRIPTION ENCOUNTER (OUTPATIENT)
Age: 59
End: 2024-03-08

## 2024-03-08 VITALS
RESPIRATION RATE: 18 BRPM | OXYGEN SATURATION: 94 % | DIASTOLIC BLOOD PRESSURE: 65 MMHG | TEMPERATURE: 101 F | SYSTOLIC BLOOD PRESSURE: 101 MMHG

## 2024-03-08 LAB
-  AMPICILLIN/SULBACTAM: SIGNIFICANT CHANGE UP
-  CEFAZOLIN: SIGNIFICANT CHANGE UP
-  CLINDAMYCIN: SIGNIFICANT CHANGE UP
-  CLINDAMYCIN: SIGNIFICANT CHANGE UP
-  ERYTHROMYCIN: SIGNIFICANT CHANGE UP
-  GENTAMICIN: SIGNIFICANT CHANGE UP
-  OXACILLIN: SIGNIFICANT CHANGE UP
-  PENICILLIN: SIGNIFICANT CHANGE UP
-  RIFAMPIN: SIGNIFICANT CHANGE UP
-  TETRACYCLINE: SIGNIFICANT CHANGE UP
-  TRIMETHOPRIM/SULFAMETHOXAZOLE: SIGNIFICANT CHANGE UP
-  VANCOMYCIN: SIGNIFICANT CHANGE UP
-  VANCOMYCIN: SIGNIFICANT CHANGE UP
ANION GAP SERPL CALC-SCNC: 13 MMOL/L — SIGNIFICANT CHANGE UP (ref 5–17)
BUN SERPL-MCNC: 13.6 MG/DL — SIGNIFICANT CHANGE UP (ref 8–20)
CALCIUM SERPL-MCNC: 8.7 MG/DL — SIGNIFICANT CHANGE UP (ref 8.4–10.5)
CHLORIDE SERPL-SCNC: 95 MMOL/L — LOW (ref 96–108)
CO2 SERPL-SCNC: 24 MMOL/L — SIGNIFICANT CHANGE UP (ref 22–29)
CREAT SERPL-MCNC: 1.26 MG/DL — SIGNIFICANT CHANGE UP (ref 0.5–1.3)
CULTURE RESULTS: ABNORMAL
EGFR: 67 ML/MIN/1.73M2 — SIGNIFICANT CHANGE UP
ERYTHROCYTE [SEDIMENTATION RATE] IN BLOOD: 48 MM/HR — HIGH (ref 0–15)
GLUCOSE BLDC GLUCOMTR-MCNC: 125 MG/DL — HIGH (ref 70–99)
GLUCOSE BLDC GLUCOMTR-MCNC: 149 MG/DL — HIGH (ref 70–99)
GLUCOSE BLDC GLUCOMTR-MCNC: 156 MG/DL — HIGH (ref 70–99)
GLUCOSE BLDC GLUCOMTR-MCNC: 238 MG/DL — HIGH (ref 70–99)
GLUCOSE SERPL-MCNC: 170 MG/DL — HIGH (ref 70–99)
METHOD TYPE: SIGNIFICANT CHANGE UP
METHOD TYPE: SIGNIFICANT CHANGE UP
ORGANISM # SPEC MICROSCOPIC CNT: ABNORMAL
ORGANISM # SPEC MICROSCOPIC CNT: ABNORMAL
ORGANISM # SPEC MICROSCOPIC CNT: SIGNIFICANT CHANGE UP
POTASSIUM SERPL-MCNC: 4 MMOL/L — SIGNIFICANT CHANGE UP (ref 3.5–5.3)
POTASSIUM SERPL-SCNC: 4 MMOL/L — SIGNIFICANT CHANGE UP (ref 3.5–5.3)
SODIUM SERPL-SCNC: 132 MMOL/L — LOW (ref 135–145)
SPECIMEN SOURCE: SIGNIFICANT CHANGE UP
VANCOMYCIN TROUGH SERPL-MCNC: 17.3 UG/ML — SIGNIFICANT CHANGE UP (ref 10–20)

## 2024-03-08 PROCEDURE — 85652 RBC SED RATE AUTOMATED: CPT

## 2024-03-08 PROCEDURE — 36415 COLL VENOUS BLD VENIPUNCTURE: CPT

## 2024-03-08 PROCEDURE — 87186 SC STD MICRODIL/AGAR DIL: CPT

## 2024-03-08 PROCEDURE — 87070 CULTURE OTHR SPECIMN AEROBIC: CPT

## 2024-03-08 PROCEDURE — 81003 URINALYSIS AUTO W/O SCOPE: CPT

## 2024-03-08 PROCEDURE — 83036 HEMOGLOBIN GLYCOSYLATED A1C: CPT

## 2024-03-08 PROCEDURE — 85027 COMPLETE CBC AUTOMATED: CPT

## 2024-03-08 PROCEDURE — 73630 X-RAY EXAM OF FOOT: CPT

## 2024-03-08 PROCEDURE — 85025 COMPLETE CBC W/AUTO DIFF WBC: CPT

## 2024-03-08 PROCEDURE — 99233 SBSQ HOSP IP/OBS HIGH 50: CPT

## 2024-03-08 PROCEDURE — 73590 X-RAY EXAM OF LOWER LEG: CPT

## 2024-03-08 PROCEDURE — 99285 EMERGENCY DEPT VISIT HI MDM: CPT | Mod: 25

## 2024-03-08 PROCEDURE — 80048 BASIC METABOLIC PNL TOTAL CA: CPT

## 2024-03-08 PROCEDURE — 85730 THROMBOPLASTIN TIME PARTIAL: CPT

## 2024-03-08 PROCEDURE — G0316 PROLONG INPT EVAL ADD15 M: CPT

## 2024-03-08 PROCEDURE — 87086 URINE CULTURE/COLONY COUNT: CPT

## 2024-03-08 PROCEDURE — 82962 GLUCOSE BLOOD TEST: CPT

## 2024-03-08 PROCEDURE — 85610 PROTHROMBIN TIME: CPT

## 2024-03-08 PROCEDURE — 86140 C-REACTIVE PROTEIN: CPT

## 2024-03-08 PROCEDURE — 73610 X-RAY EXAM OF ANKLE: CPT

## 2024-03-08 PROCEDURE — 96365 THER/PROPH/DIAG IV INF INIT: CPT

## 2024-03-08 PROCEDURE — 87040 BLOOD CULTURE FOR BACTERIA: CPT

## 2024-03-08 PROCEDURE — 99239 HOSP IP/OBS DSCHRG MGMT >30: CPT

## 2024-03-08 PROCEDURE — 80202 ASSAY OF VANCOMYCIN: CPT

## 2024-03-08 PROCEDURE — 83605 ASSAY OF LACTIC ACID: CPT

## 2024-03-08 PROCEDURE — 80053 COMPREHEN METABOLIC PANEL: CPT

## 2024-03-08 PROCEDURE — 73718 MRI LOWER EXTREMITY W/O DYE: CPT | Mod: MC

## 2024-03-08 PROCEDURE — 87077 CULTURE AEROBIC IDENTIFY: CPT

## 2024-03-08 RX ORDER — CEFAZOLIN SODIUM 1 G
2000 VIAL (EA) INJECTION EVERY 8 HOURS
Refills: 0 | Status: DISCONTINUED | OUTPATIENT
Start: 2024-03-08 | End: 2024-03-08

## 2024-03-08 RX ORDER — SODIUM CHLORIDE 9 MG/ML
1000 INJECTION, SOLUTION INTRAVENOUS
Refills: 0 | Status: DISCONTINUED | OUTPATIENT
Start: 2024-03-08 | End: 2024-03-08

## 2024-03-08 RX ORDER — METRONIDAZOLE 500 MG
1 TABLET ORAL
Qty: 84 | Refills: 0
Start: 2024-03-08 | End: 2024-04-04

## 2024-03-08 RX ORDER — MUPIROCIN 20 MG/G
1 OINTMENT TOPICAL ONCE
Refills: 0 | Status: DISCONTINUED | OUTPATIENT
Start: 2024-03-08 | End: 2024-03-08

## 2024-03-08 RX ORDER — CEFAZOLIN SODIUM 1 G
2 VIAL (EA) INJECTION
Qty: 0 | Refills: 0 | DISCHARGE
Start: 2024-03-08

## 2024-03-08 RX ORDER — METRONIDAZOLE 500 MG
500 TABLET ORAL EVERY 8 HOURS
Refills: 0 | Status: DISCONTINUED | OUTPATIENT
Start: 2024-03-08 | End: 2024-03-08

## 2024-03-08 RX ORDER — ACETAMINOPHEN 500 MG
325 TABLET ORAL ONCE
Refills: 0 | Status: COMPLETED | OUTPATIENT
Start: 2024-03-08 | End: 2024-03-08

## 2024-03-08 RX ADMIN — Medication 100 MILLIGRAM(S): at 05:39

## 2024-03-08 RX ADMIN — Medication 500 MILLIGRAM(S): at 18:32

## 2024-03-08 RX ADMIN — Medication 2: at 13:06

## 2024-03-08 RX ADMIN — Medication 5 UNIT(S): at 13:06

## 2024-03-08 RX ADMIN — Medication 250 MILLIGRAM(S): at 06:56

## 2024-03-08 RX ADMIN — Medication 2000 MILLIGRAM(S): at 21:37

## 2024-03-08 RX ADMIN — Medication 4: at 08:54

## 2024-03-08 RX ADMIN — Medication 5 UNIT(S): at 18:28

## 2024-03-08 RX ADMIN — INSULIN GLARGINE 17 UNIT(S): 100 INJECTION, SOLUTION SUBCUTANEOUS at 21:58

## 2024-03-08 RX ADMIN — Medication 5 UNIT(S): at 08:52

## 2024-03-08 RX ADMIN — SODIUM CHLORIDE 100 MILLILITER(S): 9 INJECTION, SOLUTION INTRAVENOUS at 11:41

## 2024-03-08 RX ADMIN — Medication 2000 MILLIGRAM(S): at 13:10

## 2024-03-08 RX ADMIN — CEFEPIME 2000 MILLIGRAM(S): 1 INJECTION, POWDER, FOR SOLUTION INTRAMUSCULAR; INTRAVENOUS at 05:37

## 2024-03-08 RX ADMIN — Medication 650 MILLIGRAM(S): at 18:36

## 2024-03-08 RX ADMIN — Medication 650 MILLIGRAM(S): at 11:40

## 2024-03-08 RX ADMIN — Medication 325 MILLIGRAM(S): at 21:36

## 2024-03-08 NOTE — DISCHARGE NOTE PROVIDER - ATTENDING DISCHARGE PHYSICAL EXAMINATION:
T(C): 36.7 (03-08-24 @ 13:47), Max: 39.6 (03-07-24 @ 20:47)  HR: 84 (03-08-24 @ 11:30) (74 - 90)  BP: 112/73 (03-08-24 @ 11:30) (99/62 - 146/91)  RR: 18 (03-08-24 @ 11:30) (18 - 18)  SpO2: 97% (03-08-24 @ 11:30) (95% - 99%)    GENERAL: not in acute distress  HEENT:  Clear conjunctiva, PERRL, moist oral mucosa no pharyngeal injection or exudates, no cervical LAD  RESP:  Non-labored breathing pattern, lungs clear to ausculation, no wheezes or crackles appreciated  CV: Regular rate and rhythm, no murmurs appreciated, no lower extremity edema, peripheral pulses are 2+ bilaterally  GI: Soft, non-tender, non-distended  NEURO: Awake, alert, conversant, upper and lower extremity strength grossly intact  PSYCH: Calm, cooperative, A&Ox3  SKIN: Warm and dry, left LE cellulitis appears improved compared to where marker line is

## 2024-03-08 NOTE — DIETITIAN INITIAL EVALUATION ADULT - PERTINENT MEDS FT
MEDICATIONS  (STANDING):  ceFAZolin  Injectable. 2000 milliGRAM(s) IV Push every 8 hours  dextrose 5%. 1000 milliLiter(s) (50 mL/Hr) IV Continuous <Continuous>  dextrose 50% Injectable 25 Gram(s) IV Push once  glucagon  Injectable 1 milliGRAM(s) IntraMuscular once  insulin glargine Injectable (LANTUS) 17 Unit(s) SubCutaneous at bedtime  insulin lispro (ADMELOG) corrective regimen sliding scale   SubCutaneous three times a day before meals  lactated ringers. 1000 milliLiter(s) (100 mL/Hr) IV Continuous <Continuous>    MEDICATIONS  (PRN):  dextrose Oral Gel 15 Gram(s) Oral once PRN Blood Glucose LESS THAN 70 milliGRAM(s)/deciliter

## 2024-03-08 NOTE — DIETITIAN INITIAL EVALUATION ADULT - ADD RECOMMEND
1) Continue diet as tolerated.   2) Encourage po intake, monitor diet tolerance, and provide assistance at meals as needed.   3) Monitor BG levels, correct prn   4) Obtain daily weights to monitor trends.

## 2024-03-08 NOTE — DISCHARGE NOTE PROVIDER - NSDCCPCAREPLAN_GEN_ALL_CORE_FT
PRINCIPAL DISCHARGE DIAGNOSIS  Diagnosis: Cellulitis  Assessment and Plan of Treatment: Continue abx as prescribed and return to the ED if you develop recurrent cellulitis, fevers, profuse diarrhea or other signs of worsening infection.

## 2024-03-08 NOTE — PHARMACOTHERAPY INTERVENTION NOTE - COMMENTS
Vancomycin level ordered 
Cultures growing Prevotella, discussed with ID will add flagyl. ID to discuss with attending.

## 2024-03-08 NOTE — PROGRESS NOTE ADULT - SUBJECTIVE AND OBJECTIVE BOX
[facility] Division of Medicine    SUBJECTIVE / OVERNIGHT EVENTS: Pt seen at the bedside.  Patient denies chest pain, SOB, abd pain, N/V, fever, chills, dysuria or any other complaints. All remainder ROS negative.     MEDICATIONS  (STANDING):  ceFAZolin  Injectable. 2000 milliGRAM(s) IV Push every 8 hours  dextrose 5%. 1000 milliLiter(s) (50 mL/Hr) IV Continuous <Continuous>  dextrose 5%. 1000 milliLiter(s) (100 mL/Hr) IV Continuous <Continuous>  dextrose 50% Injectable 25 Gram(s) IV Push once  dextrose 50% Injectable 12.5 Gram(s) IV Push once  dextrose 50% Injectable 25 Gram(s) IV Push once  glucagon  Injectable 1 milliGRAM(s) IntraMuscular once  insulin glargine Injectable (LANTUS) 17 Unit(s) SubCutaneous at bedtime  insulin lispro (ADMELOG) corrective regimen sliding scale   SubCutaneous three times a day before meals  insulin lispro (ADMELOG) corrective regimen sliding scale   SubCutaneous at bedtime  insulin lispro Injectable (ADMELOG) 5 Unit(s) SubCutaneous three times a day before meals  lactated ringers. 1000 milliLiter(s) (100 mL/Hr) IV Continuous <Continuous>  metroNIDAZOLE    Tablet 500 milliGRAM(s) Oral every 8 hours  mupirocin 2% Ointment 1 Application(s) Topical once    MEDICATIONS  (PRN):  acetaminophen     Tablet .. 650 milliGRAM(s) Oral every 6 hours PRN Temp greater or equal to 38C (100.4F)  dextrose Oral Gel 15 Gram(s) Oral once PRN Blood Glucose LESS THAN 70 milliGRAM(s)/deciliter      I&O's Summary      PHYSICAL EXAM:  Vital Signs Last 24 Hrs  T(C): 36.6 (08 Mar 2024 16:02), Max: 39.6 (07 Mar 2024 20:47)  T(F): 97.8 (08 Mar 2024 16:02), Max: 103.2 (07 Mar 2024 20:47)  HR: 65 (08 Mar 2024 16:02) (65 - 90)  BP: 142/74 (08 Mar 2024 16:02) (99/62 - 146/91)  BP(mean): --  RR: 18 (08 Mar 2024 16:02) (18 - 18)  SpO2: 95% (08 Mar 2024 16:02) (95% - 99%)    Parameters below as of 08 Mar 2024 16:02  Patient On (Oxygen Delivery Method): room air          GENERAL: not in acute distress  HEENT:  Clear conjunctiva, PERRL, moist oral mucosa no pharyngeal injection or exudates, no cervical LAD  RESP:  Non-labored breathing pattern, lungs clear to ausculation, no wheezes or crackles appreciated  CV: Regular rate and rhythm, no murmurs appreciated, no lower extremity edema, peripheral pulses are 2+ bilaterally  GI: Soft, non-tender, non-distended  NEURO: Awake, alert, conversant, upper and lower extremity strength grossly intact  PSYCH: Calm, cooperative, A&Ox3  SKIN: Warm and dry      LABS:                        13.7   8.84  )-----------( 188      ( 07 Mar 2024 02:49 )             37.8     03-08    132<L>  |  95<L>  |  13.6  ----------------------------<  170<H>  4.0   |  24.0  |  1.26    Ca    8.7      08 Mar 2024 03:20    TPro  5.9<L>  /  Alb  3.2<L>  /  TBili  0.9  /  DBili  x   /  AST  9   /  ALT  6   /  AlkPhos  90  03-07          Urinalysis Basic - ( 08 Mar 2024 03:20 )    Color: x / Appearance: x / SG: x / pH: x  Gluc: 170 mg/dL / Ketone: x  / Bili: x / Urobili: x   Blood: x / Protein: x / Nitrite: x   Leuk Esterase: x / RBC: x / WBC x   Sq Epi: x / Non Sq Epi: x / Bacteria: x        Culture - Surgical Swab (collected 06 Mar 2024 10:30)  Source: .Surgical Swab left foot ulcer  Final Report (08 Mar 2024 14:21):    Moderate Staphylococcus aureus    Moderate Streptococcus agalactiae (Group B)    Moderate Prevotella denticola "Susceptibilities not performed"  Organism: Staphylococcus aureus  Streptococcus agalactiae (Group B) (08 Mar 2024 14:21)  Organism: Streptococcus agalactiae (Group B) (08 Mar 2024 14:21)  Organism: Staphylococcus aureus (08 Mar 2024 14:21)    Culture - Urine (collected 06 Mar 2024 10:00)  Source: Clean Catch Clean Catch (Midstream)  Final Report (07 Mar 2024 15:17):    <10,000 CFU/mL Normal Urogenital Camila    Culture - Blood (collected 06 Mar 2024 09:40)  Source: .Blood Blood-Peripheral  Preliminary Report (08 Mar 2024 14:02):    No growth at 48 Hours    Culture - Blood (collected 06 Mar 2024 09:30)  Source: .Blood Blood-Peripheral  Preliminary Report (08 Mar 2024 14:02):    No growth at 48 Hours      CAPILLARY BLOOD GLUCOSE      POCT Blood Glucose.: 125 mg/dL (08 Mar 2024 17:37)  POCT Blood Glucose.: 156 mg/dL (08 Mar 2024 12:06)  POCT Blood Glucose.: 238 mg/dL (08 Mar 2024 08:27)  POCT Blood Glucose.: 192 mg/dL (07 Mar 2024 21:56)      IMAGING:

## 2024-03-08 NOTE — PROGRESS NOTE ADULT - ASSESSMENT
58y  Male with h/o Diabetes. Patient presented with left leg and foot swelling, redness and feeling of tightness. He reports he was on the back of his  truck and bumped his left leg while coming on and off the bed. Few days later he woke up with left leg redness and started to travel down to his foot. Patient decided to come to the ER. In the ER patient is febrile to 103F, with leukocytosis to 12.7k. MRI left foot reporting acute osteomyelitis involving the fifth metatarsal base.  Started on Levofloxacin, Metronidazole, and Vancomycin.    Left foot osteomyelitis   Left leg cellulitis  Fever   Leukocytosis   Uncontrolled DM, A1c 14.1  PCN allergy       - Blood cultures 3/6 no growth   - Foot cultures reporting (PRILIM) Staphylococcus aureus and  Streptococcus agalactiae  - CRP 68  - UA 3/6 not consistent with UTI   - CRP 68  - ESR 48  - Worsening Cr, need to monitor   - Continue Metronidazole  - Continue Vancomycin  - Monitor trough, will order next trough  - Monitor for Vancomycin toxicity   - Vancomycin required at this time pending culture results  - Monitor Cr while on Vancomycin, will order Cr for AM  - Continue Cefepime, no reactions noted   - Tolerated keflex, Meropenem and Ertapenem in the past.   - Podiatry eval noted, no plan for surgery, but given HbA1c of 14 patient is at high risk of limb loss with either surgical or conservative management.   - Discussed PICC/Mid line procedure with the patient and IV antibiotic administration   - Will plan for PICC vs midline pending final cultures because the type of line will depend on which antibiotic the patient will require.   - Follow up cultures  - Trend Fever  - Trend WBC      Will Follow    Discussed treatment plan with: Podiatry resident, Dr Amaro and clinical pharmacy    58y  Male with h/o Diabetes. Patient presented with left leg and foot swelling, redness and feeling of tightness. He reports he was on the back of his  truck and bumped his left leg while coming on and off the bed. Few days later he woke up with left leg redness and started to travel down to his foot. Patient decided to come to the ER. In the ER patient is febrile to 103F, with leukocytosis to 12.7k. MRI left foot reporting acute osteomyelitis involving the fifth metatarsal base.  Started on Levofloxacin, Metronidazole, and Vancomycin.    Left foot osteomyelitis   Left leg cellulitis  Fever   Leukocytosis   Uncontrolled DM, A1c 14.1  PCN allergy       - Blood cultures 3/6 no growth   - Foot cultures reporting (PRILIM) Staphylococcus aureus and  Streptococcus agalactiae  - CRP 68  - UA 3/6 not consistent with UTI   - CRP 68  - ESR 48  - Worsening Cr, need to monitor   - Continue Metronidazole  - Continue Vancomycin  - Monitor trough, will order next trough  - Monitor for Vancomycin toxicity   - Vancomycin required at this time pending culture results  - Monitor Cr while on Vancomycin, will order Cr for AM  - Continue Cefepime, no reactions noted   - Tolerated keflex, Meropenem and Ertapenem in the past.   - Podiatry eval noted, no plan for surgery, but given HbA1c of 14 patient is at high risk of limb loss with either surgical or conservative management.   - Discussed PICC/Mid line procedure with the patient and IV antibiotic administration   - Will plan for PICC vs midline pending final cultures because the type of line will depend on which antibiotic the patient will require.   - Follow up cultures  - Trend Fever  - Trend WBC      Addendum 3/8/24 1300  Cx now with MSSA and group B strep  Will d/c All antibiotics and start cefazolin 2gm IV q 8hours   Midline ordered  Discussed midline procedure with the patient and IV antibiotic administration   Script sent to [facility]  Antibiotics till 4/3/24          Will Follow    Discussed treatment plan with: Podiatry resident, Dr Amaro and clinical pharmacy

## 2024-03-08 NOTE — DIETITIAN INITIAL EVALUATION ADULT - ORAL INTAKE PTA/DIET HISTORY
Nutrition consult completed. Patient tolerating diet well with good appetite/PO intake. Pt reports his appetite PTA was also good with no diet restrictions followed. No current c/o N/V/C/D. States his UBE is between 205-210lsb, current weight 210 lbs. No reports of unintentional weight loss in the past year. Pt with hx of DM - labs reviewed POCT glucose ranging 126-343, HgbA1c 14.1%. Pt provided with extensive verbal and written nutrition education on consistent carbohydrate diet. Pt's questions addressed and with good understanding. May need reinforcement. RD contact information provided for further nutrition-related questions or concerns. Will continue to monitor and follow up as needed. RD remains available.

## 2024-03-08 NOTE — PROGRESS NOTE ADULT - TIME BILLING
This includes chart review, patient assessment, discussion with the patient and podiatry. Antibiotic dosing and management with clinical pharmacy. And educating patient about home intravenous antibiotics.

## 2024-03-08 NOTE — DISCHARGE NOTE PROVIDER - NSDCMRMEDTOKEN_GEN_ALL_CORE_FT
ceFAZolin 2 g intravenous injection: 2 gram(s) intravenous every 8 hours   ceFAZolin 2 g intravenous injection: 2 gram(s) intravenous every 8 hours  metroNIDAZOLE 500 mg oral tablet: 1 tab(s) orally every 8 hours

## 2024-03-08 NOTE — DISCHARGE NOTE PROVIDER - HOSPITAL COURSE
58 year old male with Diabetes presented with left leg and foot swelling, redness and feeling of tightness. As per patient he scratched his left shin 2-3 days ago and awoke this morning with swelling and redness. Pt also with chronic left foot wounds slighly worse than baseline. Pt started on broad spectrum abx and admitted for cellulitis and r/o OM. He underwent xrays consistent with soft tissue swelling and possible OM. MRI confirmed 5th toe OM. Podiatry consulted, recommended conservative management at this time. ID consulted, recommended IV abx. Wound clx collected growing MSSA. Abx switched to cefazolin. Pt remained hemodynamically stable throughout hospital course. Work up also showed uncontrolled diabetes. Pt advised to follow up with his primary doctor and to establish care with endocrinologist. Per ID. pt to c/w IV abx via midline till 4/3/2024). SW consulted. Pt medically optimized for dc home today.

## 2024-03-08 NOTE — DISCHARGE NOTE PROVIDER - PROVIDER TOKENS
FREE:[LAST:[Primary Care Doctor],PHONE:[(   )    -],FAX:[(   )    -]],FREE:[LAST:[Endocrinologist],PHONE:[(   )    -],FAX:[(   )    -]]

## 2024-03-08 NOTE — DISCHARGE NOTE PROVIDER - CARE PROVIDER_API CALL
Primary Care Doctor,   Phone: (   )    -  Fax: (   )    -  Follow Up Time:     Endocrinologist,   Phone: (   )    -  Fax: (   )    -  Follow Up Time:

## 2024-03-08 NOTE — PATIENT PROFILE ADULT - FALL HARM RISK - UNIVERSAL INTERVENTIONS
Bed in lowest position, wheels locked, appropriate side rails in place/Call bell, personal items and telephone in reach/Instruct patient to call for assistance before getting out of bed or chair/Non-slip footwear when patient is out of bed/[place] to call system/Physically safe environment - no spills, clutter or unnecessary equipment/Purposeful Proactive Rounding/Room/bathroom lighting operational, light cord in reach

## 2024-03-08 NOTE — DISCHARGE NOTE NURSING/CASE MANAGEMENT/SOCIAL WORK - HAS THE PATIENT RECEIVED THE INFLUENZA VACCINE THIS SEASON?
Health Maintenance Due   Topic Date Due   • COVID-19 Vaccine (1) Never done   • Diabetes Eye Exam  Never done   • Diabetes Foot Exam  Never done   • Pneumococcal Vaccine 0-64 (3 of 4 - PPSV23) 09/27/2019       Patient is due for topics as listed above but is not proceeding with Immunization(s) Pneumococcal, Diabetes Eye Exam and Diabetes Foot Exam at this time.     Patient presents for appointment with significant other (Oliva). Verbal consent is obtained to discuss medical history and plan of care with them present.          yes...

## 2024-03-08 NOTE — DIETITIAN INITIAL EVALUATION ADULT - OTHER INFO
58y  Male with h/o Diabetes. Patient presented with left leg and foot swelling, redness and feeling of tightness. He reports he was on the back of his  truck and bumped his left leg while coming on and off the bed. Few days later he woke up with left leg redness and started to travel down to his foot. Patient decided to come to the ER. In the ER patient is febrile to 103F, with leukocytosis to 12.7k. MRI left foot reporting acute osteomyelitis involving the fifth metatarsal base.  Started on Levofloxacin, Metronidazole, and Vancomycin.

## 2024-03-08 NOTE — PROGRESS NOTE ADULT - SUBJECTIVE AND OBJECTIVE BOX
[facility]  INFECTIOUS DISEASES at [place] / [place] / [place]  =======================================================                               James Esteves MD#   Selwyn Callejas MD*                             Kavita Reynolds MD*   Tammy Smith MD*                              Professor Emeritus:  Dr Siva Stevenson MD^            Diplomates American Board of Internal Medicine & Infectious Diseases                # [place] Office - Appt - Tel  [phone] Fax [phone]                * [place] Office - Appt - Tel [phone] Fax [phone]                      ^[place] Office - Tel  [phone] Fax [phone]                                  Hospital Consult line:  [phone]  =======================================================    ZEINAB SCHNEIDER [id number]    Follow up:      Allergies:  penicillin (Anaphylaxis)  sulfa drugs (Other)  Keflex (Other)           REVIEW OF SYSTEMS:  CONSTITUTIONAL:  No Fever or chills  HEENT:   No diplopia or blurred vision.  No earache, sore throat or runny nose.  CARDIOVASCULAR:  No Chest Pain  RESPIRATORY:  No cough, shortness of breath  GASTROINTESTINAL:  No nausea, vomiting or diarrhea.  GENITOURINARY:  No dysuria, frequency or urgency. No Blood in urine  MUSCULOSKELETAL:  no joint aches, no muscle pain  SKIN:  No change in skin, hair or nails.  NEUROLOGIC:  No Headaches, seizures   PSYCHIATRIC:  No disorder of thought or mood.  ENDOCRINE:  No heat or cold intolerance  HEMATOLOGICAL:  No easy bruising or bleeding.       Physical Exam:  GEN: NAD  HEENT: normocephalic and atraumatic. EOMI. PERRL.    NECK: Supple.   LUNGS: CTA B/L.  HEART: RRR  ABDOMEN: Soft, NT, ND.  +BS.    : No CVA tenderness  EXTREMITIES: Without  edema.  MSK: No joint swelling  NEUROLOGIC: No Focal Deficits   PSYCHIATRIC: Appropriate affect .  SKIN: No rash      Vitals:  T(F): 99 (08 Mar 2024 04:32), Max: 103.2 (07 Mar 2024 20:47)  HR: 90 (08 Mar 2024 04:32)  BP: 146/91 (08 Mar 2024 04:32)  RR: 18 (08 Mar 2024 04:32)  SpO2: 98% (08 Mar 2024 04:32) (95% - 99%)  temp max in last 48H T(F): , Max: 103.2 (03-07-24 @ 20:47)    Current Antibiotics:  cefepime  Injectable. 2000 milliGRAM(s) IV Push every 8 hours  metroNIDAZOLE  IVPB 500 milliGRAM(s) IV Intermittent every 8 hours  vancomycin  IVPB 1000 milliGRAM(s) IV Intermittent every 12 hours    Other medications:  dextrose 5%. 1000 milliLiter(s) IV Continuous <Continuous>  dextrose 5%. 1000 milliLiter(s) IV Continuous <Continuous>  dextrose 50% Injectable 25 Gram(s) IV Push once  dextrose 50% Injectable 12.5 Gram(s) IV Push once  dextrose 50% Injectable 25 Gram(s) IV Push once  glucagon  Injectable 1 milliGRAM(s) IntraMuscular once  insulin glargine Injectable (LANTUS) 17 Unit(s) SubCutaneous at bedtime  insulin lispro (ADMELOG) corrective regimen sliding scale   SubCutaneous three times a day before meals  insulin lispro (ADMELOG) corrective regimen sliding scale   SubCutaneous at bedtime  insulin lispro Injectable (ADMELOG) 5 Unit(s) SubCutaneous three times a day before meals                            13.7   8.84  )-----------( 188      ( 07 Mar 2024 02:49 )             37.8     03-08    132<L>  |  95<L>  |  13.6  ----------------------------<  170<H>  4.0   |  24.0  |  1.26    Ca    8.7      08 Mar 2024 03:20    TPro  5.9<L>  /  Alb  3.2<L>  /  TBili  0.9  /  DBili  x   /  AST  9   /  ALT  6   /  AlkPhos  90  03-07    RECENT CULTURES:  03-06 @ 10:30 .Surgical Swab left foot ulcer     Moderate Staphylococcus aureus  Moderate Streptococcus agalactiae (Group B)    03-06 @ 10:00 Clean Catch Clean Catch (Midstream)     <10,000 CFU/mL Normal Urogenital Camila    03-06 @ 09:40 .Blood Blood-Peripheral     No growth at 24 hours    03-06 @ 09:30 .Blood Blood-Peripheral     No growth at 24 hours      WBC Count: 8.84 K/uL (03-07-24 @ 02:49)  WBC Count: 12.71 K/uL (03-06-24 @ 09:30)    Creatinine: 1.26 mg/dL (03-08-24 @ 03:20)  Creatinine: 1.17 mg/dL (03-07-24 @ 02:49)  Creatinine: 1.02 mg/dL (03-06-24 @ 09:30)    C-Reactive Protein, Serum: 68 mg/L (03-06-24 @ 09:30)    Sedimentation Rate, Erythrocyte: 48 mm/hr (03-08-24 @ 03:20)    Vancomycin Level, Trough: 17.3 ug/mL (03-08-24 @ 03:20)     [facility]  INFECTIOUS DISEASES at [place] / [place] / [place]  =======================================================                               James Esteves MD#   Selwyn Callejas MD*                             Kavita Reynolds MD*   Tammy Smith MD*                              Professor Emeritus:  Dr Siva Stevenson MD^            Diplomates American Board of Internal Medicine & Infectious Diseases                # [place] Office - Appt - Tel  [phone] Fax [phone]                * [place] Office - Appt - Tel [phone] Fax [phone]                      ^[place] Office - Tel  [phone] Fax [phone]                                  Hospital Consult line:  [phone]  =======================================================    AIDEEZEINAB LAIRD [id number]    Follow up: Left foot osteomyelitis     no fevers     Allergies:  penicillin (Anaphylaxis)  sulfa drugs (Other)  Keflex (Other)         REVIEW OF SYSTEMS:  CONSTITUTIONAL:  No Fever or chills  HEENT:  No diplopia or blurred vision.  No earache, sore throat or runny nose.  CARDIOVASCULAR:  No chest pain  RESPIRATORY:  No cough, shortness of breath  GASTROINTESTINAL:  No nausea, vomiting or diarrhea.  GENITOURINARY:  No dysuria, frequency or urgency. No Blood in urine  MUSCULOSKELETAL:  no joint aches, no muscle pain  SKIN:  left foot ulcer   NEUROLOGIC:  No Headaches      Physical Exam:  GEN: NAD  HEENT: normocephalic and atraumatic. EOMI. PERRL.    NECK: Supple.   LUNGS: CTA B/L.  HEART: RRR  ABDOMEN: Soft, NT, ND.  +BS.    : No CVA tenderness  EXTREMITIES: Without  edema.  MSK: No joint swelling  NEUROLOGIC: No Focal Deficits   SKIN: No rash      Vitals:  T(F): 99 (08 Mar 2024 04:32), Max: 103.2 (07 Mar 2024 20:47)  HR: 90 (08 Mar 2024 04:32)  BP: 146/91 (08 Mar 2024 04:32)  RR: 18 (08 Mar 2024 04:32)  SpO2: 98% (08 Mar 2024 04:32) (95% - 99%)  temp max in last 48H T(F): , Max: 103.2 (03-07-24 @ 20:47)    Current Antibiotics:  cefepime  Injectable. 2000 milliGRAM(s) IV Push every 8 hours  metroNIDAZOLE  IVPB 500 milliGRAM(s) IV Intermittent every 8 hours  vancomycin  IVPB 1000 milliGRAM(s) IV Intermittent every 12 hours    Other medications:  dextrose 5%. 1000 milliLiter(s) IV Continuous <Continuous>  dextrose 5%. 1000 milliLiter(s) IV Continuous <Continuous>  dextrose 50% Injectable 25 Gram(s) IV Push once  dextrose 50% Injectable 12.5 Gram(s) IV Push once  dextrose 50% Injectable 25 Gram(s) IV Push once  glucagon  Injectable 1 milliGRAM(s) IntraMuscular once  insulin glargine Injectable (LANTUS) 17 Unit(s) SubCutaneous at bedtime  insulin lispro (ADMELOG) corrective regimen sliding scale   SubCutaneous three times a day before meals  insulin lispro (ADMELOG) corrective regimen sliding scale   SubCutaneous at bedtime  insulin lispro Injectable (ADMELOG) 5 Unit(s) SubCutaneous three times a day before meals                            13.7   8.84  )-----------( 188      ( 07 Mar 2024 02:49 )             37.8     03-08    132<L>  |  95<L>  |  13.6  ----------------------------<  170<H>  4.0   |  24.0  |  1.26    Ca    8.7      08 Mar 2024 03:20    TPro  5.9<L>  /  Alb  3.2<L>  /  TBili  0.9  /  DBili  x   /  AST  9   /  ALT  6   /  AlkPhos  90  03-07    RECENT CULTURES:  03-06 @ 10:30 .Surgical Swab left foot ulcer     Moderate Staphylococcus aureus  Moderate Streptococcus agalactiae (Group B)    03-06 @ 10:00 Clean Catch Clean Catch (Midstream)     <10,000 CFU/mL Normal Urogenital Camila    03-06 @ 09:40 .Blood Blood-Peripheral     No growth at 24 hours    03-06 @ 09:30 .Blood Blood-Peripheral     No growth at 24 hours      WBC Count: 8.84 K/uL (03-07-24 @ 02:49)  WBC Count: 12.71 K/uL (03-06-24 @ 09:30)    Creatinine: 1.26 mg/dL (03-08-24 @ 03:20)  Creatinine: 1.17 mg/dL (03-07-24 @ 02:49)  Creatinine: 1.02 mg/dL (03-06-24 @ 09:30)    C-Reactive Protein, Serum: 68 mg/L (03-06-24 @ 09:30)    Sedimentation Rate, Erythrocyte: 48 mm/hr (03-08-24 @ 03:20)    Vancomycin Level, Trough: 17.3 ug/mL (03-08-24 @ 03:20)

## 2024-03-08 NOTE — DISCHARGE NOTE NURSING/CASE MANAGEMENT/SOCIAL WORK - NSDCPEFALRISK_GEN_ALL_CORE
For information on Fall & Injury Prevention, visit: [email or web address] OR  [email or web address] OR  https://www.cdc.gov/steadi/patient.html

## 2024-03-08 NOTE — PROCEDURE NOTE - ADDITIONAL PROCEDURE DETAILS
#18G 10CM 30CIRC BARD POWER GLIDE MIDLINE inserted with ultrasound guidance.   Good flash, ns flush left basilic vein.   Patient tolerated well.

## 2024-03-08 NOTE — DISCHARGE NOTE NURSING/CASE MANAGEMENT/SOCIAL WORK - PATIENT PORTAL LINK FT
You can access the FollowMyHealth Patient Portal offered by [facility] by registering at the following website: [email or web address]. By joining [employer]’s FollowMyHealth portal, you will also be able to view your health information using other applications (apps) compatible with our system.

## 2024-03-08 NOTE — PROGRESS NOTE ADULT - ASSESSMENT
58 year old male with with Diabetes presented with left leg and foot swelling, redness and feeling of tightness. As per patient he scratched his left shin 2-3 days ago and awoke this morning with swelling and redness.  Leukocytosis and with Xray suggestive of osteomyelitis left first MTP.  Received 3LN IVF and Vancomycin in ER      # Cellulitis Left leg  # Osteomyelitis Left first MTP ?Chronic  # Diabetic Left Plantar ulcer  - MRI suggestive of 5th toe OM  - podiatry following, no surgical intervention at this time  - ID consulted  - clx growing MSSA and proteus  - c/w Metronidazole and cefazolin  - Blood cultures ngtd  - Monitor for fever and trend WBC  - to be discharged on IV and PO abx    # Uncontrolled Diabetes with Hyperglycemia  # Hyponatremia  - a1c 14  - inc Glargine to 17U and c/w premeal Lispro 5U three times a day  - Blood glucose monitoring with sliding scale Lispro  - pt to follow up with PCP and endocrinologist     VTE Prophylaxis - Intermittent venous compression devices

## 2024-03-08 NOTE — DIETITIAN INITIAL EVALUATION ADULT - PERTINENT LABORATORY DATA
03-08 Na132 mmol/L<L> Glu 170 mg/dL<H> K+ 4.0 mmol/L Cr  1.26 mg/dL BUN 13.6 mg/dL     POCT Blood Glucose.: 156 mg/dL (03-08-24 @ 12:06)  A1C with Estimated Average Glucose Result: 14.1 % (03-07-24 @ 02:49)  A1C with Estimated Average Glucose Result: 14.1 % (03-06-24 @ 09:30)

## 2024-03-12 ENCOUNTER — APPOINTMENT (OUTPATIENT)
Dept: INTERNAL MEDICINE | Facility: CLINIC | Age: 59
End: 2024-03-12
Payer: COMMERCIAL

## 2024-03-12 ENCOUNTER — OUTPATIENT (OUTPATIENT)
Dept: OUTPATIENT SERVICES | Facility: HOSPITAL | Age: 59
LOS: 1 days | End: 2024-03-12
Payer: COMMERCIAL

## 2024-03-12 VITALS
DIASTOLIC BLOOD PRESSURE: 78 MMHG | SYSTOLIC BLOOD PRESSURE: 126 MMHG | WEIGHT: 207 LBS | HEIGHT: 68 IN | HEART RATE: 75 BPM | OXYGEN SATURATION: 99 % | RESPIRATION RATE: 16 BRPM | TEMPERATURE: 97.7 F | BODY MASS INDEX: 31.37 KG/M2

## 2024-03-12 DIAGNOSIS — M86.9 OSTEOMYELITIS, UNSPECIFIED: ICD-10-CM

## 2024-03-12 DIAGNOSIS — Z89.429 ACQUIRED ABSENCE OF OTHER TOE(S), UNSPECIFIED SIDE: Chronic | ICD-10-CM

## 2024-03-12 PROCEDURE — 76942 ECHO GUIDE FOR BIOPSY: CPT

## 2024-03-12 PROCEDURE — 76937 US GUIDE VASCULAR ACCESS: CPT | Mod: 26

## 2024-03-12 PROCEDURE — 36558 INSERT TUNNELED CV CATH: CPT

## 2024-03-12 PROCEDURE — C1751: CPT

## 2024-03-12 PROCEDURE — G2211 COMPLEX E/M VISIT ADD ON: CPT | Mod: NC,1L

## 2024-03-12 PROCEDURE — 36410 VNPNXR 3YR/> PHY/QHP DX/THER: CPT

## 2024-03-12 PROCEDURE — 99496 TRANSJ CARE MGMT HIGH F2F 7D: CPT

## 2024-03-12 NOTE — HISTORY OF PRESENT ILLNESS
[FreeTextEntry1] : 58-year-old male with history of uncontrolled [facility] mellitus [facility] he was found to have left foot diabetic foot infection complicated with osteomyelitis.  Patient was initially treated with vancomycin Levaquin and metronidazole.  Antibiotic switched to cefepime and metronidazole.  Foot cultures grew out MSSA and group B streptococcus.  His CRP was 68 and ESR was 48.  He was seen by podiatry and no surgical intervention was planned.  His hemoglobin A1c was 14 in the hospital.  A midline was placed and conservative management was planned patient was discharged with IV cefazolin 2 g every 8 hours and metronidazole 500 mg p.o. every 8 hours with a plan to treat till April 3, 2024.  Patient now presents for hospital follow-up.  He reports he had issues with his midline and now is having issues with his peripheral IV.  He has been taking the intravenous antibiotics otherwise but was not able to take this morning's dose due to his malfunction of his peripheral IV line.  Reports no fever no chills.  Denies any diarrhea.

## 2024-03-12 NOTE — PHYSICAL EXAM
[General Appearance - Alert] : alert [General Appearance - In No Acute Distress] : in no acute distress [General Appearance - Well-Appearing] : healthy appearing [Sclera] : the sclera and conjunctiva were normal [Outer Ear] : the ears and nose were normal in appearance [Neck Appearance] : the appearance of the neck was normal [Exaggerated Use Of Accessory Muscles For Inspiration] : no accessory muscle use [] : no rash [FreeTextEntry1] : Left foot wound [Motor Exam] : the motor exam was normal [No Focal Deficits] : no focal deficits [Oriented To Time, Place, And Person] : oriented to person, place, and time [Affect] : the affect was normal

## 2024-03-12 NOTE — ASSESSMENT
[FreeTextEntry1] : 58-year-old male with uncontrolled diabetes mellitus currently out of diabetic medication, here for follow-up of left foot diabetic foot infection complicated with osteomyelitis.  Diabetic foot infection Left foot osteomyelitis Uncontrolled diabetes mellitus type 2 with hemoglobin A1c of 14.1 Penicillin allergy  Recommendation: Hospital discharge reviewed with the patient. Patient reports he is out of his diabetic medication and is currently or was taking Lantus 12 units at bedtime and Humalog 5 units with each meal.  He does not have any physician managing his diabetes at this time. Patient educated on importance of treatment and control of his diabetes mellitus since he is at high risk of limb loss unless diabetes is controlled. Cultures from his foot grew out MSSA and group B streptococcus. Plan to continue cefazolin 2 g IV every 8 hours and oral Flagyl 500 mg every 8 hours. Since his IV line are no longer working called radiology and arranged new midline to be placed.  He will be going to radiology after this appointment for midline placement.  He will resume his antibiotics after Placement of the Midline. Will monitor weekly labs. Will reach out to [facility] access to arrange endocrinology appointment. Meantime we will refill his Lantus and Humalog.  Follow-up in 3 to 4 weeks  Counseling included lab results, differential diagnosis, treatment options, risks and benefits, lifestyle changes, current condition, medications and dose adjustments. The patient was interactive attentive and asked questions and verbalized understanding.

## 2024-03-12 NOTE — PROCEDURE NOTE - ADDITIONAL PROCEDURE DETAILS
New Screens Powermidline 4fr. 12CM midline catheter inserted. [R] basilic vein. Good flash, easily flushes, sharps disposed of. Tourniquet removed.

## 2024-03-12 NOTE — PROCEDURE NOTE - NSTOLERANCE_GEN_A_CORE
[facility] Delmis Montes De Oca, DO   Patient tolerated procedure well.

## 2024-03-25 ENCOUNTER — APPOINTMENT (OUTPATIENT)
Dept: ENDOCRINOLOGY | Facility: CLINIC | Age: 59
End: 2024-03-25
Payer: COMMERCIAL

## 2024-03-25 VITALS
BODY MASS INDEX: 31.83 KG/M2 | HEIGHT: 68 IN | WEIGHT: 210 LBS | OXYGEN SATURATION: 98 % | HEART RATE: 82 BPM | DIASTOLIC BLOOD PRESSURE: 82 MMHG | SYSTOLIC BLOOD PRESSURE: 122 MMHG

## 2024-03-25 LAB — GLUCOSE BLDC GLUCOMTR-MCNC: 218

## 2024-03-25 PROCEDURE — 99214 OFFICE O/P EST MOD 30 MIN: CPT

## 2024-03-25 PROCEDURE — 82962 GLUCOSE BLOOD TEST: CPT

## 2024-03-25 RX ORDER — INSULIN GLARGINE 100 [IU]/ML
100 INJECTION, SOLUTION SUBCUTANEOUS
Qty: 2 | Refills: 0 | Status: DISCONTINUED | COMMUNITY
Start: 2024-03-12 | End: 2024-03-25

## 2024-03-25 RX ORDER — BLOOD SUGAR DIAGNOSTIC
STRIP MISCELLANEOUS
Qty: 400 | Refills: 0 | Status: DISCONTINUED | COMMUNITY
Start: 2021-12-22 | End: 2024-03-25

## 2024-03-25 RX ORDER — SILDENAFIL 100 MG/1
100 TABLET, FILM COATED ORAL
Qty: 10 | Refills: 0 | Status: DISCONTINUED | COMMUNITY
Start: 2022-03-22 | End: 2024-03-25

## 2024-03-25 RX ORDER — BLOOD SUGAR DIAGNOSTIC
STRIP MISCELLANEOUS
Refills: 0 | Status: DISCONTINUED | COMMUNITY
End: 2024-03-25

## 2024-03-25 RX ORDER — PEN NEEDLE, DIABETIC 32GX 5/32"
32G X 4 MM NEEDLE, DISPOSABLE MISCELLANEOUS
Qty: 400 | Refills: 1 | Status: ACTIVE | COMMUNITY
Start: 2020-03-12 | End: 1900-01-01

## 2024-03-25 RX ORDER — LEVOFLOXACIN 500 MG/1
500 TABLET, FILM COATED ORAL
Qty: 10 | Refills: 0 | Status: DISCONTINUED | COMMUNITY
Start: 2022-07-17 | End: 2024-03-25

## 2024-03-25 RX ORDER — OXYCODONE AND ACETAMINOPHEN 5; 325 MG/1; MG/1
5-325 TABLET ORAL
Qty: 28 | Refills: 0 | Status: DISCONTINUED | COMMUNITY
Start: 2022-07-22 | End: 2024-03-25

## 2024-03-25 RX ORDER — LANCETS
EACH MISCELLANEOUS
Refills: 0 | Status: DISCONTINUED | COMMUNITY
End: 2024-03-25

## 2024-03-25 RX ORDER — BLOOD-GLUCOSE METER
EACH MISCELLANEOUS
Qty: 1 | Refills: 0 | Status: DISCONTINUED | COMMUNITY
Start: 2020-03-10 | End: 2024-03-25

## 2024-03-25 RX ORDER — TADALAFIL 20 MG/1
20 TABLET ORAL
Refills: 0 | Status: DISCONTINUED | COMMUNITY
End: 2024-03-25

## 2024-03-25 RX ORDER — BLOOD SUGAR DIAGNOSTIC
STRIP MISCELLANEOUS 3 TIMES DAILY
Qty: 3 | Refills: 0 | Status: DISCONTINUED | COMMUNITY
Start: 2020-03-10 | End: 2024-03-25

## 2024-03-25 RX ORDER — BLOOD-GLUCOSE METER
EACH MISCELLANEOUS
Refills: 0 | Status: DISCONTINUED | COMMUNITY
End: 2024-03-25

## 2024-03-25 RX ORDER — GENTAMICIN SULFATE 1 MG/G
0.1 OINTMENT TOPICAL
Qty: 15 | Refills: 0 | Status: DISCONTINUED | COMMUNITY
Start: 2022-05-12 | End: 2024-03-25

## 2024-03-25 RX ORDER — LANCETS
EACH MISCELLANEOUS
Qty: 1 | Refills: 3 | Status: DISCONTINUED | COMMUNITY
Start: 2020-03-10 | End: 2024-03-25

## 2024-03-25 RX ORDER — INSULIN ASPART 100 [IU]/ML
100 INJECTION, SOLUTION INTRAVENOUS; SUBCUTANEOUS
Qty: 3 | Refills: 0 | Status: ACTIVE | COMMUNITY
Start: 2021-11-26 | End: 1900-01-01

## 2024-03-25 NOTE — ASSESSMENT
[Diabetes Foot Care] : diabetes foot care [Long Term Vascular Complications] : long term vascular complications of diabetes [Carbohydrate Consistent Diet] : carbohydrate consistent diet [Importance of Diet and Exercise] : importance of diet and exercise to improve glycemic control, achieve weight loss and improve cardiovascular health [Exercise/Effect on Glucose] : exercise/effect on glucose [Retinopathy Screening] : Patient was referred to ophthalmology for retinopathy screening [FreeTextEntry1] : 59 y/o male has h/o Osteomyelitis of left foot with toe amputation, Type 2 DM. A1C 14.1% non-compliant with f/u appt  Plan:  Type 2 DM: uncontrolled - Increase Lantus 16 units at HS -Start Humalog scale 6 units +2 units for every 50 above 150 TID AC, written and given to patient  -+ rotating injection sites - continue low carb diet  -Add more protein to diet  -exercise as tolerated - educated on the importance of annual eye exams r/t retinopathy  -Follow up with ID and podiatry  -Needs frequent follow up for tight glucose control  Osteomyelitis of left foot: healed, no has small ulcer on outside of left foot, seeing podiatry every week    Lipids: not on statin/ anti-lipid treatment.   Renal/ B/P : B/P wnl , not on ACE/ ARB. No proteinuria.  Weight: Overweight Balanced diet and exercise reviewed.   RTO in 6 weeks RTO in 6 months MD

## 2024-03-25 NOTE — PHYSICAL EXAM
[Alert] : alert [Normal Sclera/Conjunctiva] : normal sclera/conjunctiva [Normal Hearing] : hearing was normal [No Neck Mass] : no neck mass was observed [Thyroid Not Enlarged] : the thyroid was not enlarged [No Respiratory Distress] : no respiratory distress [No Accessory Muscle Use] : no accessory muscle use [Clear to Auscultation] : lungs were clear to auscultation bilaterally [Normal S1, S2] : normal S1 and S2 [Normal Rate] : heart rate was normal [No Stigmata of Cushings Syndrome] : no stigmata of Cushings Syndrome [Left foot was examined, including] : left foot ~C was examined, including visual inspection with sensory and pulse exams [Right foot was examined, including] : right foot ~C was examined, including visual inspection with sensory and pulse exams [Normal] : normal [Diminished Throughout Both Feet] : diminished tactile sensation with monofilament testing throughout both feet [Oriented x3] : oriented to person, place, and time [Delayed in the Left Toes] : normal in the toes [Delayed in the Right Toes] : normal in the toes

## 2024-03-25 NOTE — HISTORY OF PRESENT ILLNESS
[FreeTextEntry1] : Jose is a 58 yr old male, who is here for follow up on DM type 2, Has not been seen since 8/22. Pt has h/o oseteomyelitis. Hx DM with neuropathy, s/p partial 5th amputation on foot and partial 1st toe amputation on R-foot 11/21.  Interval Hx: Was recently at [facility], for cellulitis in Left calf to foot. Now On IV antibiotics and oral antibiotics  Has not been seen since 8/2022.    DM type 2,.  He reports being diabetic for more than 10 years, admits to being non compliant, was on metformin for some time. which he self d/sevin due to adverse GI effects. Stopped insulin for over a year. Now back on insulin.   Quality: Type 2 DM Severity: uncontrolled Duration: 8 years ago  HgbA1C 14.1% 3/2024   Current Regimen: Basaglar 12 units at night Novolog 5-10 units with each meal Metformin in past, could not tolerate   Self blood sugar monitoring: 3-4 times a day Average in AM this , yesterday 152, usually 150-200 Before meals: 140-160s Denies Hypoglycemic events    Diet: never had a bad diet, cutting out more carbs B- eggs, yogurt L- salad with chicken D- chicken, beans, vegetables Snacks - nuts, fruit   Weight: stable  Date of last eye exam: over 2 years ago, -  ,  Date of last foot exam: seeing posiatrist every week, small ulcer on outside on left foot  Date of last flu vaccine: no Date of last Pneumovax: no

## 2024-03-25 NOTE — REVIEW OF SYSTEMS
[Fatigue] : no fatigue [Visual Field Defect] : no visual field defect [Dysphagia] : no dysphagia [Chest Pain] : no chest pain [Shortness Of Breath] : no shortness of breath [Palpitations] : no palpitations [Nausea] : no nausea [Constipation] : no constipation [Vomiting] : no vomiting [Diarrhea] : no diarrhea [Polyuria] : no polyuria [Polydipsia] : no polydipsia

## 2024-04-02 ENCOUNTER — APPOINTMENT (OUTPATIENT)
Dept: INTERNAL MEDICINE | Facility: CLINIC | Age: 59
End: 2024-04-02

## 2024-05-10 LAB — HBA1C MFR BLD HPLC: 14.1

## 2024-05-13 ENCOUNTER — APPOINTMENT (OUTPATIENT)
Dept: ENDOCRINOLOGY | Facility: CLINIC | Age: 59
End: 2024-05-13
Payer: COMMERCIAL

## 2024-05-13 VITALS
HEART RATE: 75 BPM | OXYGEN SATURATION: 97 % | SYSTOLIC BLOOD PRESSURE: 125 MMHG | HEIGHT: 68 IN | WEIGHT: 221 LBS | DIASTOLIC BLOOD PRESSURE: 80 MMHG | BODY MASS INDEX: 33.49 KG/M2

## 2024-05-13 DIAGNOSIS — E78.5 HYPERLIPIDEMIA, UNSPECIFIED: ICD-10-CM

## 2024-05-13 DIAGNOSIS — E11.65 TYPE 2 DIABETES MELLITUS WITH HYPERGLYCEMIA: ICD-10-CM

## 2024-05-13 LAB — GLUCOSE BLDC GLUCOMTR-MCNC: 207

## 2024-05-13 PROCEDURE — 99214 OFFICE O/P EST MOD 30 MIN: CPT

## 2024-05-13 PROCEDURE — 82962 GLUCOSE BLOOD TEST: CPT

## 2024-05-13 RX ORDER — INSULIN GLARGINE 100 [IU]/ML
100 INJECTION, SOLUTION SUBCUTANEOUS
Qty: 2 | Refills: 1 | Status: ACTIVE | COMMUNITY
Start: 2020-03-12 | End: 1900-01-01

## 2024-05-13 NOTE — ASSESSMENT
[Diabetes Foot Care] : diabetes foot care [Long Term Vascular Complications] : long term vascular complications of diabetes [Carbohydrate Consistent Diet] : carbohydrate consistent diet [Importance of Diet and Exercise] : importance of diet and exercise to improve glycemic control, achieve weight loss and improve cardiovascular health [Exercise/Effect on Glucose] : exercise/effect on glucose [Retinopathy Screening] : Patient was referred to ophthalmology for retinopathy screening [FreeTextEntry1] : 57 y/o male has h/o Osteomyelitis of left foot with toe amputation, Type 2 DM. A1C 14.1% n  Plan:  Type 2 DM: uncontrolled - Increase Lantus 18 units at HS -Continue Humalog scale 6 units +2 units for every 50 above 150 TID AC, written and given to patient  -+ rotating injection sites - continue low carb diet  -Add more protein to diet  -exercise as tolerated - educated on the importance of annual eye exams r/t retinopathy  -Follow up with ID and podiatry  -Needs frequent follow up for tight glucose control -Discussed GLP-1, pt declines, he heard too many bad things   Osteomyelitis of left foot: healed,  small ulcer on outside of left foot, seeing podiatry every week    Lipids: not on statin/ anti-lipid treatment.need updated lipids    Renal/ B/P : B/P wnl , not on ACE/ ARB. No proteinuria.  Weight: Overweight Balanced diet and exercise reviewed.   RTO in 8 weeks with NP with fasting labs RTO in 5 months MD

## 2024-05-13 NOTE — HISTORY OF PRESENT ILLNESS
[FreeTextEntry1] : Interval hx: smashed left shin on his  truck tail gate, was in [facility] 3/2024 for cellulitis  not on antibitoics anymore. all healed now, just got cleared to exercise again   Medical Hx: Pt has h/o oseteomyelitis. Hx DM with neuropathy, s/p partial 5th amputation on foot and partial 1st toe amputation on R-foot 11/21.  Quality:T2DM Severity:uncontrolled Duration: 10 years  Modifying Factors: insulin  SMBG 2-3x's a day  -170s, majority of time, occasional 180-200 Lunch - sometimes does not check at lunch because work is busy, but he will give Novolog 6-8 units with lunch  Dinner: 130-150s  HgbA1C: 14.1% 3/6/2024  Current Regimen: Lantus 16 units QHS  Novolog Humalog scale 6 units +2 units for every 50 above 150 TID AC Metformin in past, could not tolerate  Eye Exam: over due, needs to make appointment  Foot Exam: podiatrist last week, looks good  Kidney Disease: none Heart Disease: none   Weight:+11 pounds  Diet: tying to watch, cold cuts at lunch. eats a lot of pasta  Exercise: just clearance to start exercising  Smoking: none

## 2024-05-13 NOTE — REVIEW OF SYSTEMS
[Fatigue] : no fatigue [Recent Weight Gain (___ Lbs)] : recent weight gain: [unfilled] lbs [Visual Field Defect] : no visual field defect [Dysphagia] : no dysphagia [Chest Pain] : no chest pain [Palpitations] : no palpitations [Shortness Of Breath] : no shortness of breath [Nausea] : no nausea [Constipation] : no constipation [Vomiting] : no vomiting [Diarrhea] : no diarrhea [Polyuria] : no polyuria [Polydipsia] : no polydipsia

## 2024-07-08 ENCOUNTER — APPOINTMENT (OUTPATIENT)
Dept: ENDOCRINOLOGY | Facility: CLINIC | Age: 59
End: 2024-07-08
Payer: COMMERCIAL

## 2024-07-08 VITALS
BODY MASS INDEX: 34.1 KG/M2 | HEIGHT: 68 IN | WEIGHT: 225 LBS | OXYGEN SATURATION: 98 % | SYSTOLIC BLOOD PRESSURE: 124 MMHG | HEART RATE: 71 BPM | DIASTOLIC BLOOD PRESSURE: 80 MMHG

## 2024-07-08 DIAGNOSIS — E78.5 HYPERLIPIDEMIA, UNSPECIFIED: ICD-10-CM

## 2024-07-08 DIAGNOSIS — E11.65 TYPE 2 DIABETES MELLITUS WITH HYPERGLYCEMIA: ICD-10-CM

## 2024-07-08 LAB — GLUCOSE BLDC GLUCOMTR-MCNC: 312

## 2024-07-08 PROCEDURE — 99214 OFFICE O/P EST MOD 30 MIN: CPT

## 2024-07-08 PROCEDURE — 82962 GLUCOSE BLOOD TEST: CPT

## 2024-07-08 PROCEDURE — G2211 COMPLEX E/M VISIT ADD ON: CPT

## 2024-10-14 ENCOUNTER — APPOINTMENT (OUTPATIENT)
Dept: ENDOCRINOLOGY | Facility: CLINIC | Age: 59
End: 2024-10-14

## 2025-03-18 ENCOUNTER — RX RENEWAL (OUTPATIENT)
Age: 60
End: 2025-03-18

## (undated) DEVICE — DRSG XEROFORM 5"

## (undated) DEVICE — PACK EXTREMITY SOUTHSIDE

## (undated) DEVICE — SOL IRR POUR H2O 1000ML

## (undated) DEVICE — SOL IRR POUR NS 0.9% 1000ML

## (undated) DEVICE — WRAP COMPRESSION CALF MED

## (undated) DEVICE — SUT VICRYL 4-0 27" PS-2 UNDYED

## (undated) DEVICE — BLADE SURGICAL #15 CARBON

## (undated) DEVICE — DRSG WEBRIL 6"

## (undated) DEVICE — DRSG ESMARK 6"

## (undated) DEVICE — NDL HYPO REGULAR BEVEL 25G X 1.5"

## (undated) DEVICE — BUR STRYKER JNOTCH ROUND CARBIDE 3.0MM

## (undated) DEVICE — SUT VICRYL 3-0 27" PS-2 UNDYED

## (undated) DEVICE — GLV 7.5 PROTEXIS

## (undated) DEVICE — DRAPE C ARM UNIVERSAL

## (undated) DEVICE — DRSG ACE BANDAGE 6"

## (undated) DEVICE — BLANKET WARMER LOWER ADULT

## (undated) DEVICE — SUT ETHILON 4-0 18" PS-2